# Patient Record
Sex: FEMALE | Race: WHITE | NOT HISPANIC OR LATINO | URBAN - METROPOLITAN AREA
[De-identification: names, ages, dates, MRNs, and addresses within clinical notes are randomized per-mention and may not be internally consistent; named-entity substitution may affect disease eponyms.]

---

## 2022-06-11 ENCOUNTER — INPATIENT (INPATIENT)
Facility: HOSPITAL | Age: 72
LOS: 5 days | Discharge: NURSING, REHAB OR LONG-TERM CARE | DRG: 69 | End: 2022-06-17
Attending: STUDENT IN AN ORGANIZED HEALTH CARE EDUCATION/TRAINING PROGRAM | Admitting: INTERNAL MEDICINE
Payer: MEDICARE

## 2022-06-11 VITALS
RESPIRATION RATE: 16 BRPM | OXYGEN SATURATION: 99 % | WEIGHT: 129.41 LBS | SYSTOLIC BLOOD PRESSURE: 177 MMHG | HEART RATE: 117 BPM | TEMPERATURE: 97 F | DIASTOLIC BLOOD PRESSURE: 84 MMHG

## 2022-06-11 DIAGNOSIS — Z90.710 ACQUIRED ABSENCE OF BOTH CERVIX AND UTERUS: Chronic | ICD-10-CM

## 2022-06-11 DIAGNOSIS — G45.9 TRANSIENT CEREBRAL ISCHEMIC ATTACK, UNSPECIFIED: ICD-10-CM

## 2022-06-11 LAB
ALBUMIN SERPL ELPH-MCNC: 2.8 G/DL — LOW (ref 3.3–5)
ALP SERPL-CCNC: 76 U/L — SIGNIFICANT CHANGE UP (ref 40–120)
ALT FLD-CCNC: 18 U/L — SIGNIFICANT CHANGE UP (ref 10–45)
ANION GAP SERPL CALC-SCNC: 10 MMOL/L — SIGNIFICANT CHANGE UP (ref 5–17)
ANION GAP SERPL CALC-SCNC: 8 MMOL/L — SIGNIFICANT CHANGE UP (ref 5–17)
APTT BLD: 30.8 SEC — SIGNIFICANT CHANGE UP (ref 27.5–35.5)
AST SERPL-CCNC: 31 U/L — SIGNIFICANT CHANGE UP (ref 10–40)
BASOPHILS # BLD AUTO: 0.06 K/UL — SIGNIFICANT CHANGE UP (ref 0–0.2)
BASOPHILS NFR BLD AUTO: 0.5 % — SIGNIFICANT CHANGE UP (ref 0–2)
BILIRUB SERPL-MCNC: 0.3 MG/DL — SIGNIFICANT CHANGE UP (ref 0.2–1.2)
BUN SERPL-MCNC: 48 MG/DL — HIGH (ref 7–23)
BUN SERPL-MCNC: 51 MG/DL — HIGH (ref 7–23)
CALCIUM SERPL-MCNC: 8.3 MG/DL — LOW (ref 8.4–10.5)
CALCIUM SERPL-MCNC: 8.5 MG/DL — SIGNIFICANT CHANGE UP (ref 8.4–10.5)
CHLORIDE SERPL-SCNC: 109 MMOL/L — HIGH (ref 96–108)
CHLORIDE SERPL-SCNC: 109 MMOL/L — HIGH (ref 96–108)
CO2 SERPL-SCNC: 21 MMOL/L — LOW (ref 22–31)
CO2 SERPL-SCNC: 22 MMOL/L — SIGNIFICANT CHANGE UP (ref 22–31)
CREAT SERPL-MCNC: 3.66 MG/DL — HIGH (ref 0.5–1.3)
CREAT SERPL-MCNC: 3.67 MG/DL — HIGH (ref 0.5–1.3)
D DIMER BLD IA.RAPID-MCNC: 1611 NG/ML DDU — HIGH
EGFR: 12 ML/MIN/1.73M2 — LOW
EGFR: 12 ML/MIN/1.73M2 — LOW
EOSINOPHIL # BLD AUTO: 0.36 K/UL — SIGNIFICANT CHANGE UP (ref 0–0.5)
EOSINOPHIL NFR BLD AUTO: 2.9 % — SIGNIFICANT CHANGE UP (ref 0–6)
GLUCOSE BLDC GLUCOMTR-MCNC: 128 MG/DL — HIGH (ref 70–99)
GLUCOSE SERPL-MCNC: 123 MG/DL — HIGH (ref 70–99)
GLUCOSE SERPL-MCNC: 136 MG/DL — HIGH (ref 70–99)
HCT VFR BLD CALC: 35.5 % — SIGNIFICANT CHANGE UP (ref 34.5–45)
HGB BLD-MCNC: 11.2 G/DL — LOW (ref 11.5–15.5)
IMM GRANULOCYTES NFR BLD AUTO: 0.6 % — SIGNIFICANT CHANGE UP (ref 0–1.5)
INR BLD: 1.03 RATIO — SIGNIFICANT CHANGE UP (ref 0.88–1.16)
LYMPHOCYTES # BLD AUTO: 42.1 % — SIGNIFICANT CHANGE UP (ref 13–44)
LYMPHOCYTES # BLD AUTO: 5.22 K/UL — HIGH (ref 1–3.3)
MCHC RBC-ENTMCNC: 27.1 PG — SIGNIFICANT CHANGE UP (ref 27–34)
MCHC RBC-ENTMCNC: 31.5 GM/DL — LOW (ref 32–36)
MCV RBC AUTO: 86 FL — SIGNIFICANT CHANGE UP (ref 80–100)
MONOCYTES # BLD AUTO: 0.65 K/UL — SIGNIFICANT CHANGE UP (ref 0–0.9)
MONOCYTES NFR BLD AUTO: 5.2 % — SIGNIFICANT CHANGE UP (ref 2–14)
NEUTROPHILS # BLD AUTO: 6.02 K/UL — SIGNIFICANT CHANGE UP (ref 1.8–7.4)
NEUTROPHILS NFR BLD AUTO: 48.7 % — SIGNIFICANT CHANGE UP (ref 43–77)
NRBC # BLD: 0 /100 WBCS — SIGNIFICANT CHANGE UP (ref 0–0)
PLATELET # BLD AUTO: 319 K/UL — SIGNIFICANT CHANGE UP (ref 150–400)
POTASSIUM SERPL-MCNC: 4.6 MMOL/L — SIGNIFICANT CHANGE UP (ref 3.5–5.3)
POTASSIUM SERPL-MCNC: 6 MMOL/L — HIGH (ref 3.5–5.3)
POTASSIUM SERPL-SCNC: 4.6 MMOL/L — SIGNIFICANT CHANGE UP (ref 3.5–5.3)
POTASSIUM SERPL-SCNC: 6 MMOL/L — HIGH (ref 3.5–5.3)
PROCALCITONIN SERPL-MCNC: 0.09 NG/ML — HIGH
PROT SERPL-MCNC: 7.6 G/DL — SIGNIFICANT CHANGE UP (ref 6–8.3)
PROTHROM AB SERPL-ACNC: 12 SEC — SIGNIFICANT CHANGE UP (ref 10.5–13.4)
RBC # BLD: 4.13 M/UL — SIGNIFICANT CHANGE UP (ref 3.8–5.2)
RBC # FLD: 17.5 % — HIGH (ref 10.3–14.5)
SARS-COV-2 RNA SPEC QL NAA+PROBE: SIGNIFICANT CHANGE UP
SODIUM SERPL-SCNC: 139 MMOL/L — SIGNIFICANT CHANGE UP (ref 135–145)
SODIUM SERPL-SCNC: 140 MMOL/L — SIGNIFICANT CHANGE UP (ref 135–145)
TROPONIN I, HIGH SENSITIVITY RESULT: 11 NG/L — SIGNIFICANT CHANGE UP
TROPONIN I, HIGH SENSITIVITY RESULT: 13.2 NG/L — SIGNIFICANT CHANGE UP
WBC # BLD: 12.39 K/UL — HIGH (ref 3.8–10.5)
WBC # FLD AUTO: 12.39 K/UL — HIGH (ref 3.8–10.5)

## 2022-06-11 PROCEDURE — 70450 CT HEAD/BRAIN W/O DYE: CPT | Mod: 26,77

## 2022-06-11 PROCEDURE — 70450 CT HEAD/BRAIN W/O DYE: CPT | Mod: 26,MA

## 2022-06-11 PROCEDURE — 99222 1ST HOSP IP/OBS MODERATE 55: CPT

## 2022-06-11 PROCEDURE — 71045 X-RAY EXAM CHEST 1 VIEW: CPT | Mod: 26

## 2022-06-11 PROCEDURE — 99285 EMERGENCY DEPT VISIT HI MDM: CPT

## 2022-06-11 PROCEDURE — 93010 ELECTROCARDIOGRAM REPORT: CPT

## 2022-06-11 RX ORDER — ASPIRIN/CALCIUM CARB/MAGNESIUM 324 MG
324 TABLET ORAL ONCE
Refills: 0 | Status: COMPLETED | OUTPATIENT
Start: 2022-06-11 | End: 2022-06-11

## 2022-06-11 RX ORDER — ATORVASTATIN CALCIUM 80 MG/1
80 TABLET, FILM COATED ORAL AT BEDTIME
Refills: 0 | Status: DISCONTINUED | OUTPATIENT
Start: 2022-06-11 | End: 2022-06-17

## 2022-06-11 RX ORDER — HEPARIN SODIUM 5000 [USP'U]/ML
5000 INJECTION INTRAVENOUS; SUBCUTANEOUS EVERY 8 HOURS
Refills: 0 | Status: DISCONTINUED | OUTPATIENT
Start: 2022-06-11 | End: 2022-06-12

## 2022-06-11 RX ORDER — SERTRALINE 25 MG/1
50 TABLET, FILM COATED ORAL DAILY
Refills: 0 | Status: DISCONTINUED | OUTPATIENT
Start: 2022-06-11 | End: 2022-06-17

## 2022-06-11 RX ORDER — HYDROCORTISONE 1 %
1 OINTMENT (GRAM) TOPICAL AT BEDTIME
Refills: 0 | Status: DISCONTINUED | OUTPATIENT
Start: 2022-06-11 | End: 2022-06-17

## 2022-06-11 RX ORDER — INSULIN LISPRO 100/ML
VIAL (ML) SUBCUTANEOUS AT BEDTIME
Refills: 0 | Status: DISCONTINUED | OUTPATIENT
Start: 2022-06-11 | End: 2022-06-17

## 2022-06-11 RX ORDER — PANTOPRAZOLE SODIUM 20 MG/1
40 TABLET, DELAYED RELEASE ORAL
Refills: 0 | Status: DISCONTINUED | OUTPATIENT
Start: 2022-06-11 | End: 2022-06-17

## 2022-06-11 RX ORDER — INSULIN GLARGINE 100 [IU]/ML
6 INJECTION, SOLUTION SUBCUTANEOUS AT BEDTIME
Refills: 0 | Status: DISCONTINUED | OUTPATIENT
Start: 2022-06-12 | End: 2022-06-17

## 2022-06-11 RX ORDER — DEXTROSE 50 % IN WATER 50 %
25 SYRINGE (ML) INTRAVENOUS ONCE
Refills: 0 | Status: DISCONTINUED | OUTPATIENT
Start: 2022-06-11 | End: 2022-06-17

## 2022-06-11 RX ORDER — INSULIN LISPRO 100/ML
VIAL (ML) SUBCUTANEOUS
Refills: 0 | Status: DISCONTINUED | OUTPATIENT
Start: 2022-06-11 | End: 2022-06-17

## 2022-06-11 RX ORDER — SODIUM CHLORIDE 9 MG/ML
1000 INJECTION, SOLUTION INTRAVENOUS
Refills: 0 | Status: DISCONTINUED | OUTPATIENT
Start: 2022-06-11 | End: 2022-06-17

## 2022-06-11 RX ORDER — MIRTAZAPINE 45 MG/1
15 TABLET, ORALLY DISINTEGRATING ORAL AT BEDTIME
Refills: 0 | Status: DISCONTINUED | OUTPATIENT
Start: 2022-06-11 | End: 2022-06-17

## 2022-06-11 RX ORDER — LACTOBACILLUS ACIDOPHILUS 100MM CELL
1 CAPSULE ORAL DAILY
Refills: 0 | Status: DISCONTINUED | OUTPATIENT
Start: 2022-06-11 | End: 2022-06-17

## 2022-06-11 RX ORDER — DEXTROSE 50 % IN WATER 50 %
15 SYRINGE (ML) INTRAVENOUS ONCE
Refills: 0 | Status: DISCONTINUED | OUTPATIENT
Start: 2022-06-11 | End: 2022-06-17

## 2022-06-11 RX ORDER — ASPIRIN/CALCIUM CARB/MAGNESIUM 324 MG
81 TABLET ORAL DAILY
Refills: 0 | Status: DISCONTINUED | OUTPATIENT
Start: 2022-06-12 | End: 2022-06-13

## 2022-06-11 RX ORDER — DEXTROSE 50 % IN WATER 50 %
12.5 SYRINGE (ML) INTRAVENOUS ONCE
Refills: 0 | Status: DISCONTINUED | OUTPATIENT
Start: 2022-06-11 | End: 2022-06-17

## 2022-06-11 RX ORDER — LANOLIN ALCOHOL/MO/W.PET/CERES
3 CREAM (GRAM) TOPICAL AT BEDTIME
Refills: 0 | Status: DISCONTINUED | OUTPATIENT
Start: 2022-06-11 | End: 2022-06-17

## 2022-06-11 RX ORDER — CLOPIDOGREL BISULFATE 75 MG/1
75 TABLET, FILM COATED ORAL DAILY
Refills: 0 | Status: DISCONTINUED | OUTPATIENT
Start: 2022-06-11 | End: 2022-06-13

## 2022-06-11 RX ORDER — GLUCAGON INJECTION, SOLUTION 0.5 MG/.1ML
1 INJECTION, SOLUTION SUBCUTANEOUS ONCE
Refills: 0 | Status: DISCONTINUED | OUTPATIENT
Start: 2022-06-11 | End: 2022-06-17

## 2022-06-11 RX ORDER — ACETAMINOPHEN 500 MG
650 TABLET ORAL EVERY 8 HOURS
Refills: 0 | Status: DISCONTINUED | OUTPATIENT
Start: 2022-06-11 | End: 2022-06-17

## 2022-06-11 RX ORDER — SENNA PLUS 8.6 MG/1
2 TABLET ORAL AT BEDTIME
Refills: 0 | Status: DISCONTINUED | OUTPATIENT
Start: 2022-06-11 | End: 2022-06-17

## 2022-06-11 RX ADMIN — HEPARIN SODIUM 5000 UNIT(S): 5000 INJECTION INTRAVENOUS; SUBCUTANEOUS at 22:26

## 2022-06-11 RX ADMIN — Medication 324 MILLIGRAM(S): at 18:35

## 2022-06-11 RX ADMIN — ATORVASTATIN CALCIUM 80 MILLIGRAM(S): 80 TABLET, FILM COATED ORAL at 22:25

## 2022-06-11 RX ADMIN — Medication 3 MILLIGRAM(S): at 23:20

## 2022-06-11 RX ADMIN — SENNA PLUS 2 TABLET(S): 8.6 TABLET ORAL at 22:26

## 2022-06-11 RX ADMIN — CLOPIDOGREL BISULFATE 75 MILLIGRAM(S): 75 TABLET, FILM COATED ORAL at 22:26

## 2022-06-11 RX ADMIN — MIRTAZAPINE 15 MILLIGRAM(S): 45 TABLET, ORALLY DISINTEGRATING ORAL at 23:20

## 2022-06-11 NOTE — H&P ADULT - HISTORY OF PRESENT ILLNESS
72F hx of CVA with dense L hemiparesis since , HTN, HLD, hypothyroid, CKD3, T2DM on insulin, depression pw transient L facial droop. Pt related she had been well but while trying to move her L arm with her other hand, she believed she had a 'panic attack' as if the 'bed were swallowing her up'. She then screamed for help. She denied any associated HA, dizziness, worsening weakness, SOB, CP, diaphoresis or palps. Staff noted new L facial droop and drooling and pt was sent to ED. On arrival, L facial droop has completely resolved. In ED, afebrile P: 117  BP: 177/84 sat 99% on RA. WBC: 12.4 48% N. d-dimer: 1611 K:6  BUN/cr: 48/3.66 B trop: 11.  CT head no new changes     Pt denied any new fevers, chills, cough, SOB, NVD or dysuria.

## 2022-06-11 NOTE — ED ADULT NURSE NOTE - OBJECTIVE STATEMENT
72 yr old female BIB EMS from Nursing Home for facial droop. Patient was sent from [facility] for increased facial droop and drooling per Paramedic now resolved.  Patient denies numbness, paresthesia, numbness, dizziness, chest pain, shortness of breath. Finger yogil480 mg/dl.

## 2022-06-11 NOTE — ED ADULT TRIAGE NOTE - AS TEMP SITE
SPOKE [id number] [facility] AND REFERRED THIS CASE TO HER SINCE SHE SPEAKS WITH THE PATIENT WEEKLY. oral

## 2022-06-11 NOTE — H&P ADULT - NSHPLABSRESULTS_GEN_ALL_CORE
11.2   12.39 )-----------( 319      ( 11 Jun 2022 18:10 )             35.5       06-11    140  |  109<H>  |  51<H>  ----------------------------<  136<H>  4.6   |  21<L>  |  3.67<H>    Ca    8.3<L>      11 Jun 2022 20:00    TPro  7.6  /  Alb  2.8<L>  /  TBili  0.3  /  DBili  x   /  AST  31  /  ALT  18  /  AlkPhos  76  06-11         LIVER FUNCTIONS - ( 11 Jun 2022 18:10 )  Alb: 2.8 g/dL / Pro: 7.6 g/dL / ALK PHOS: 76 U/L / ALT: 18 U/L / AST: 31 U/L / GGT: x               PT/INR - ( 11 Jun 2022 18:10 )   PT: 12.0 sec;   INR: 1.03 ratio         PTT - ( 11 Jun 2022 18:10 )  PTT:30.8 sec      POCT Blood Glucose.: 128 mg/dL (11 Jun 2022 20:43)    06-11 @ 18:10  1611 ng/mL DDU<H>      EKG: personally rev. sinus tachy at 117 bpm, Q III, avF and V1-3 no acute st changes.       CXR: wet read. personally rev. NAPD.    rad< from: CT Brain Stroke Protocol (06.11.22 @ 18:02) >    IMPRESSION:    1)  chronic ischemic changes are noted in both hemispheres, with far more   extensive gliosis and encephalomalacia in the right FERNANDO territory, but   there are chronic ischemic changes in the right MCA territory as well.   Follow-up MR imaging is recommended to rule out the possibility of a   small area of acute ischemia. There is no visible hemorrhage or   intraluminal thrombus.    < end of copied text >

## 2022-06-11 NOTE — ED PROVIDER NOTE - OBJECTIVE STATEMENT
72 y f hx of prior stroke L sided weakness minor fs droop from prior stroke noted to have worsening fs droop in nursing home as per ems in ed resolved no other new defecits

## 2022-06-11 NOTE — ED ADULT NURSE NOTE - CHIEF COMPLAINT QUOTE
[facility] and [facility] now resolved.  F/S-125.  Evaluated by Dr Damian upon arrival.  Pt sent directly to CT

## 2022-06-11 NOTE — PATIENT PROFILE ADULT - FALL HARM RISK - HARM RISK INTERVENTIONS

## 2022-06-11 NOTE — ED PROVIDER NOTE - NIH STROKE SCALE: 11. EXTINCTION AND INATTENTION, QM
Cee 45 Transitions Initial Follow Up Call    Outreach made within 2 business days of discharge: Yes    Patient: Oliverio Fall Patient : 1948   MRN: [id number]  Reason for Admission: There are no discharge diagnoses documented for the most recent discharge. Discharge Date: 21       Spoke with: wife    Discharge department/facility: [facility] Interactive Patient Contact:  Was patient able to fill all prescriptions: Yes  Was patient instructed to bring all medications to the follow-up visit: Yes  Is patient taking all medications as directed in the discharge summary?  Yes  Does patient understand their discharge instructions: Yes  Does patient have questions or concerns that need addressed prior to 7-14 day follow up office visit: no    Scheduled appointment with PCP within 7-14 days    Follow Up  Future Appointments   Date Time Provider Jeri Flynn   2021 11:00 AM JULIAN Mccann [facility]   2021  2:00 PM JULIAN Mccann [place], [place]
(0) No abnormality

## 2022-06-11 NOTE — ED ADULT TRIAGE NOTE - CHIEF COMPLAINT QUOTE
[facility] and drooling now resolved.  F/S-125.  Evaluated by Dr Damian upon arrival.  Pt sent directly to CT

## 2022-06-11 NOTE — ED ADULT NURSE NOTE - NSICDXPASTMEDICALHX_GEN_ALL_CORE_FT
PAST MEDICAL HISTORY:  CVA (cerebrovascular accident)     Hemiplegia due to infarction of brain     Hyperlipidemia     Hypothyroidism     Stage 1 chronic kidney disease

## 2022-06-11 NOTE — H&P ADULT - NSHPREVIEWOFSYSTEMS_GEN_ALL_CORE
CONSTITUTIONAL: No fever, weight loss, or fatigue  EYES: No eye pain, visual disturbances, or discharge  ENMT:  No difficulty hearing, tinnitus, vertigo; No sinus or throat pain  NECK: No pain or stiffness  RESPIRATORY: No cough, wheezing, chills or hemoptysis; No shortness of breath  CARDIOVASCULAR: No chest pain, palpitations, dizziness, or leg swelling  GASTROINTESTINAL: No abdominal or epigastric pain. No nausea, vomiting, or hematemesis; No diarrhea or constipation. No melena or hematochezia.  GENITOURINARY: No dysuria, frequency, hematuria, or incontinence  NEUROLOGICAL: No headaches, memory loss, loss of strength, numbness, or tremors. L facial droop as per HPI now resolved  SKIN: No itching, burning, rashes, or lesions   LYMPH NODES: No enlarged glands  ENDOCRINE: No heat or cold intolerance; No hair loss  MUSCULOSKELETAL: No joint pain or swelling; No muscle, back, or extremity pain  PSYCHIATRIC: No depression, anxiety, mood swings, or difficulty sleeping  HEME/LYMPH: No easy bruising, or bleeding gums  ALLERY AND IMMUNOLOGIC: No hives or eczema    IMPROVE VTE Individual Risk Assessment          RISK                                                          Points  [  ] Previous VTE                                                3  [  ] Thrombophilia                                             2  [ 2 ] Lower limb paralysis                                   2        (unable to hold up >15 seconds)    [  ] Current Cancer                                             2         (within 6 months)  [  ] Immobilization > 24 hrs                              1  [  ] ICU/CCU stay > 24 hours                             1  [ 1 ] Age > 60                                                         1    IMPROVE VTE Score:         [     3    ]    Total Risk Factor Score:    0 - 1:   Consider IPC  >2 - 3:  Thromboprophylaxis required (enoxaparin or SQ heparin)        >4:   High Risk: Thromboprophylaxis required (enoxaparin or SQ heparin), optional add IPC  **If CONTRAINDICATION to enoxaparin or SQ heparin, USE IPCs**

## 2022-06-11 NOTE — H&P ADULT - NSICDXFAMILYHX_GEN_ALL_CORE_FT
FAMILY HISTORY:  Family history of CVA  FH: CAD (coronary artery disease)  FH: diabetes mellitus

## 2022-06-11 NOTE — PATIENT PROFILE ADULT - NSPRESCRUSEDDRG_GEN_A_NUR
PPD READ AS NEGATIVE BY ALYSA WALSH. IV LEAKING; NOT IN.  REMOVED. NOTIFIED DR. Hallie Contreras; OK TO LEAVE OUT IF TOLERATES PO WELL. No

## 2022-06-11 NOTE — H&P ADULT - NSHPPHYSICALEXAM_GEN_ALL_CORE
Vital Signs Last 24 Hrs  T(C): 36.7 (11 Jun 2022 19:29), Max: 36.7 (11 Jun 2022 19:29)  T(F): 98.1 (11 Jun 2022 19:29), Max: 98.1 (11 Jun 2022 19:29)  HR: 107 (11 Jun 2022 19:29) (107 - 117)  BP: 160/81 (11 Jun 2022 19:29) (160/81 - 177/84)  BP(mean): --  RR: 17 (11 Jun 2022 19:29) (16 - 17)  SpO2: 99% (11 Jun 2022 19:29) (99% - 99%)  Daily     Daily   CAPILLARY BLOOD GLUCOSE      POCT Blood Glucose.: 128 mg/dL (11 Jun 2022 20:43)    I&O's Summary      GENERAL: NAD  HEAD:  Normocephalic  EYES: EOMI, PERRLA, conjunctiva and sclera clear  ENMT: No tonsillar erythema, exudates, or enlargement; Moist mucous membranes, No lesions  NECK: Supple, No JVD, no bruit, normal thyroid  NERVOUS SYSTEM:  Alert & Oriented X3, Good concentration; neg drift in RUE and RLE. very trace L facial droop. dense L hemiplegia of LUE and LLE with L wrist flexion, L elbow contracture  CHEST/LUNG: Clear to auscultation bilaterally; No rales, rhonchi, wheezing, or rubs  HEART: Regular rate and rhythm; No murmurs, rubs, or gallops  ABDOMEN: Soft, Nontender, +mod distention. ?palp bladder; Bowel sounds present  EXTREMITIES:  2+ Peripheral Pulses, No clubbing, cyanosis, . L LE swelling compared to R.   LYMPH: No lymphadenopathy noted  SKIN: No rashes or lesions

## 2022-06-11 NOTE — H&P ADULT - ASSESSMENT
72F hx of CVA with dense L hemiparesis since 2003, HTN, HLD, hypothyroid, CKD3, T2DM on insulin, depression pw transient L facial droop.    # Transient L facial droop resolved.   cont aspirin and statin.   check lipid profile, hgbA1c, ECHO, neuro consult.   neuro checks.  passed dysphagia screen.     #Elevated d-dimer and swollen L LE  check doppler of LLE.  consider VQ scan given CKD.     #Hx of CKD now hyperkalemic.  recheck potassium  check bladder scan.     #HTN   not currently on meds.    #HLD  cont statin     #Hypothyroid  not currently on meds.   check TSH.     #Depression   cont zoloft.     #GOC  Pt is DNR/DNI and reaffirms DNR/DNI status.     Addendum: After seeing pt, within 5mins, RN reported pt tachy to 150s with L facial droop. PT seen and examined - completely AOx3 with obvious L facial droop some deviated gaze to the L with rest of neuro exam unchanged. Telestroke contacted spoke with Dr Díaz, no indication for tPA given already dense HP and pt with still understandable speech.  repeat CT head. check MRI/MRA (but not urgent to require tx). cont ASA/statin and add on PLavix and keep SBP above 150. Pt passed repeat dysphagia screen.     Sister Valentine Neely [phone] updated and notified.  72F hx of CVA with dense L hemiparesis since 2003, HTN, HLD, hypothyroid, CKD3, T2DM on insulin, depression pw transient L facial droop.    # Transient L facial droop resolved.   cont aspirin and statin.   check lipid profile, hgbA1c, ECHO, neuro consult.   neuro checks.  passed dysphagia screen.     #Elevated d-dimer and swollen L LE  check doppler of LLE.  consider VQ scan given CKD.     #Hx of CKD now hyperkalemic. unclear baseline  recheck potassium  check bladder scan.     #HTN   not currently on meds.    #HLD  cont statin     #Hypothyroid  not currently on meds.   check TSH.     #T2DM on insulin  reduce lantus to 6u qhs for now.   correctional insulin.  check HgA1c.     #Depression   cont zoloft.     #GOC  Pt is DNR/DNI and reaffirms DNR/DNI status.     Addendum: After seeing pt, within 5mins, 830PM. RN reported pt tachy to 150s with L facial droop. PT seen and examined - completely AOx3 with obvious L facial droop some deviated gaze to the L with rest of neuro exam unchanged. NIHSS score 10 from baseline 8 earlier. Telestroke contacted spoke with Dr Díaz, no indication for tPA given already dense HP and pt with still understandable speech.  repeat CT head. check MRI/MRA (but not urgent to require tx). cont ASA/statin and add on PLavix and keep SBP above 150. Pt passed repeat dysphagia screen.     Sister Valentine Neely [phone] updated and notified.  72F hx of CVA with dense L hemiparesis since 2003, HTN, HLD, hypothyroid, CKD3, T2DM on insulin, depression pw transient L facial droop.    # Transient L facial droop resolved.   cont aspirin and statin.   check lipid profile, hgbA1c, ECHO, neuro consult.   neuro checks.  passed dysphagia screen.     #Elevated d-dimer and swollen L LE  check doppler of LLE.  consider VQ scan given CKD.     #Hx of CKD now hyperkalemic. unclear baseline  recheck potassium  check bladder scan.     #HTN   not currently on meds.    #HLD  cont statin     #Hypothyroid  not currently on meds.   check TSH.     #T2DM on insulin  reduce lantus to 6u qhs for now.   correctional insulin.  check HgA1c.     #Depression   cont zoloft.     #GOC  Pt is DNR/DNI and reaffirms DNR/DNI status.     Addendum: After seeing pt, within 5mins, 830PM. RN reported pt tachy to 150s with L facial droop. PT seen and examined - completely AOx3 with obvious L facial droop some deviated gaze to the L with rest of neuro exam unchanged. NIHSS score 10 from baseline 9 earlier. Telestroke contacted spoke with Dr Díaz, no indication for tPA given already dense HP and pt with still understandable speech.  repeat CT head. check MRI/MRA (but not urgent to require tx). cont ASA/statin and add on PLavix and keep SBP above 150. Pt passed repeat dysphagia screen.     Sister Valentine Neely [phone] updated and notified.

## 2022-06-11 NOTE — ED PROVIDER NOTE - PHYSICAL EXAMINATION
General:     NAD, well-nourished, well-appearing  Head:     NC/AT, EOMI, oral mucosa moist  Neck:     trachea midline  Lungs:     CTA b/l, no w/r/r  CVS:     S1S2, RRR, no m/g/r  Abd:     +BS, s/nt/nd, no organomegaly  Ext:    2+ radial and pedal pulses, no c/c/e  Neuro: AAOx3, no sensor L UL, LL weakness minor fs droop baseline

## 2022-06-12 LAB
A1C WITH ESTIMATED AVERAGE GLUCOSE RESULT: 6.1 % — HIGH (ref 4–5.6)
ALBUMIN SERPL ELPH-MCNC: 3 G/DL — LOW (ref 3.3–5)
ALP SERPL-CCNC: 75 U/L — SIGNIFICANT CHANGE UP (ref 40–120)
ALT FLD-CCNC: 9 U/L — LOW (ref 10–45)
ANION GAP SERPL CALC-SCNC: 13 MMOL/L — SIGNIFICANT CHANGE UP (ref 5–17)
APPEARANCE UR: CLEAR — SIGNIFICANT CHANGE UP
APTT BLD: 35.9 SEC — HIGH (ref 27.5–35.5)
AST SERPL-CCNC: 17 U/L — SIGNIFICANT CHANGE UP (ref 10–40)
BACTERIA # UR AUTO: ABNORMAL /HPF
BASOPHILS # BLD AUTO: 0.09 K/UL — SIGNIFICANT CHANGE UP (ref 0–0.2)
BASOPHILS NFR BLD AUTO: 0.6 % — SIGNIFICANT CHANGE UP (ref 0–2)
BILIRUB SERPL-MCNC: 0.3 MG/DL — SIGNIFICANT CHANGE UP (ref 0.2–1.2)
BILIRUB UR-MCNC: NEGATIVE — SIGNIFICANT CHANGE UP
BUN SERPL-MCNC: 50 MG/DL — HIGH (ref 7–23)
CALCIUM SERPL-MCNC: 8.6 MG/DL — SIGNIFICANT CHANGE UP (ref 8.4–10.5)
CHLORIDE SERPL-SCNC: 110 MMOL/L — HIGH (ref 96–108)
CHOLEST SERPL-MCNC: 145 MG/DL — SIGNIFICANT CHANGE UP
CO2 SERPL-SCNC: 17 MMOL/L — LOW (ref 22–31)
COLOR SPEC: YELLOW — SIGNIFICANT CHANGE UP
CREAT SERPL-MCNC: 3.7 MG/DL — HIGH (ref 0.5–1.3)
DIFF PNL FLD: NEGATIVE — SIGNIFICANT CHANGE UP
EGFR: 12 ML/MIN/1.73M2 — LOW
EOSINOPHIL # BLD AUTO: 0.31 K/UL — SIGNIFICANT CHANGE UP (ref 0–0.5)
EOSINOPHIL NFR BLD AUTO: 2.1 % — SIGNIFICANT CHANGE UP (ref 0–6)
EPI CELLS # UR: SIGNIFICANT CHANGE UP
ESTIMATED AVERAGE GLUCOSE: 128 MG/DL — HIGH (ref 68–114)
GLUCOSE BLDC GLUCOMTR-MCNC: 105 MG/DL — HIGH (ref 70–99)
GLUCOSE BLDC GLUCOMTR-MCNC: 78 MG/DL — SIGNIFICANT CHANGE UP (ref 70–99)
GLUCOSE BLDC GLUCOMTR-MCNC: 86 MG/DL — SIGNIFICANT CHANGE UP (ref 70–99)
GLUCOSE BLDC GLUCOMTR-MCNC: 86 MG/DL — SIGNIFICANT CHANGE UP (ref 70–99)
GLUCOSE SERPL-MCNC: 87 MG/DL — SIGNIFICANT CHANGE UP (ref 70–99)
GLUCOSE UR QL: NEGATIVE — SIGNIFICANT CHANGE UP
HCT VFR BLD CALC: 34.5 % — SIGNIFICANT CHANGE UP (ref 34.5–45)
HCV AB S/CO SERPL IA: 0.11 S/CO — SIGNIFICANT CHANGE UP (ref 0–0.99)
HCV AB SERPL-IMP: SIGNIFICANT CHANGE UP
HDLC SERPL-MCNC: 43 MG/DL — LOW
HGB BLD-MCNC: 10.9 G/DL — LOW (ref 11.5–15.5)
IMM GRANULOCYTES NFR BLD AUTO: 0.6 % — SIGNIFICANT CHANGE UP (ref 0–1.5)
INR BLD: 1.03 RATIO — SIGNIFICANT CHANGE UP (ref 0.88–1.16)
KETONES UR-MCNC: NEGATIVE — SIGNIFICANT CHANGE UP
LEUKOCYTE ESTERASE UR-ACNC: ABNORMAL
LIPID PNL WITH DIRECT LDL SERPL: 86 MG/DL — SIGNIFICANT CHANGE UP
LYMPHOCYTES # BLD AUTO: 35.8 % — SIGNIFICANT CHANGE UP (ref 13–44)
LYMPHOCYTES # BLD AUTO: 5.17 K/UL — HIGH (ref 1–3.3)
MCHC RBC-ENTMCNC: 27.1 PG — SIGNIFICANT CHANGE UP (ref 27–34)
MCHC RBC-ENTMCNC: 31.6 GM/DL — LOW (ref 32–36)
MCV RBC AUTO: 85.8 FL — SIGNIFICANT CHANGE UP (ref 80–100)
MONOCYTES # BLD AUTO: 0.94 K/UL — HIGH (ref 0–0.9)
MONOCYTES NFR BLD AUTO: 6.5 % — SIGNIFICANT CHANGE UP (ref 2–14)
NEUTROPHILS # BLD AUTO: 7.84 K/UL — HIGH (ref 1.8–7.4)
NEUTROPHILS NFR BLD AUTO: 54.4 % — SIGNIFICANT CHANGE UP (ref 43–77)
NITRITE UR-MCNC: NEGATIVE — SIGNIFICANT CHANGE UP
NON HDL CHOLESTEROL: 102 MG/DL — SIGNIFICANT CHANGE UP
NRBC # BLD: 0 /100 WBCS — SIGNIFICANT CHANGE UP (ref 0–0)
PH UR: 6.5 — SIGNIFICANT CHANGE UP (ref 5–8)
PLATELET # BLD AUTO: 325 K/UL — SIGNIFICANT CHANGE UP (ref 150–400)
POTASSIUM SERPL-MCNC: 5 MMOL/L — SIGNIFICANT CHANGE UP (ref 3.5–5.3)
POTASSIUM SERPL-SCNC: 5 MMOL/L — SIGNIFICANT CHANGE UP (ref 3.5–5.3)
PROT SERPL-MCNC: 7.6 G/DL — SIGNIFICANT CHANGE UP (ref 6–8.3)
PROT UR-MCNC: 15
PROTHROM AB SERPL-ACNC: 12 SEC — SIGNIFICANT CHANGE UP (ref 10.5–13.4)
RBC # BLD: 4.02 M/UL — SIGNIFICANT CHANGE UP (ref 3.8–5.2)
RBC # FLD: 17.5 % — HIGH (ref 10.3–14.5)
RBC CASTS # UR COMP ASSIST: SIGNIFICANT CHANGE UP /HPF (ref 0–4)
SODIUM SERPL-SCNC: 140 MMOL/L — SIGNIFICANT CHANGE UP (ref 135–145)
SP GR SPEC: 1.01 — SIGNIFICANT CHANGE UP (ref 1.01–1.02)
TRIGL SERPL-MCNC: 83 MG/DL — SIGNIFICANT CHANGE UP
TSH SERPL-MCNC: 3.5 UIU/ML — SIGNIFICANT CHANGE UP (ref 0.36–3.74)
UROBILINOGEN FLD QL: NEGATIVE — SIGNIFICANT CHANGE UP
WBC # BLD: 14.43 K/UL — HIGH (ref 3.8–10.5)
WBC # FLD AUTO: 14.43 K/UL — HIGH (ref 3.8–10.5)
WBC UR QL: ABNORMAL /HPF (ref 0–5)

## 2022-06-12 PROCEDURE — 99233 SBSQ HOSP IP/OBS HIGH 50: CPT

## 2022-06-12 PROCEDURE — 70450 CT HEAD/BRAIN W/O DYE: CPT | Mod: 26

## 2022-06-12 PROCEDURE — 93971 EXTREMITY STUDY: CPT | Mod: 26,LT

## 2022-06-12 PROCEDURE — 93306 TTE W/DOPPLER COMPLETE: CPT | Mod: 26

## 2022-06-12 RX ORDER — HEPARIN SODIUM 5000 [USP'U]/ML
2500 INJECTION INTRAVENOUS; SUBCUTANEOUS EVERY 6 HOURS
Refills: 0 | Status: DISCONTINUED | OUTPATIENT
Start: 2022-06-12 | End: 2022-06-13

## 2022-06-12 RX ORDER — HEPARIN SODIUM 5000 [USP'U]/ML
5000 INJECTION INTRAVENOUS; SUBCUTANEOUS EVERY 6 HOURS
Refills: 0 | Status: DISCONTINUED | OUTPATIENT
Start: 2022-06-12 | End: 2022-06-13

## 2022-06-12 RX ORDER — AMLODIPINE BESYLATE 2.5 MG/1
5 TABLET ORAL DAILY
Refills: 0 | Status: DISCONTINUED | OUTPATIENT
Start: 2022-06-12 | End: 2022-06-17

## 2022-06-12 RX ORDER — WARFARIN SODIUM 2.5 MG/1
2 TABLET ORAL ONCE
Refills: 0 | Status: COMPLETED | OUTPATIENT
Start: 2022-06-12 | End: 2022-06-12

## 2022-06-12 RX ORDER — HEPARIN SODIUM 5000 [USP'U]/ML
5000 INJECTION INTRAVENOUS; SUBCUTANEOUS ONCE
Refills: 0 | Status: COMPLETED | OUTPATIENT
Start: 2022-06-12 | End: 2022-06-12

## 2022-06-12 RX ORDER — HEPARIN SODIUM 5000 [USP'U]/ML
INJECTION INTRAVENOUS; SUBCUTANEOUS
Qty: 25000 | Refills: 0 | Status: DISCONTINUED | OUTPATIENT
Start: 2022-06-12 | End: 2022-06-13

## 2022-06-12 RX ADMIN — Medication 1 DROP(S): at 18:30

## 2022-06-12 RX ADMIN — Medication 81 MILLIGRAM(S): at 12:04

## 2022-06-12 RX ADMIN — HEPARIN SODIUM 5000 UNIT(S): 5000 INJECTION INTRAVENOUS; SUBCUTANEOUS at 13:37

## 2022-06-12 RX ADMIN — Medication 1 DROP(S): at 05:26

## 2022-06-12 RX ADMIN — WARFARIN SODIUM 2 MILLIGRAM(S): 2.5 TABLET ORAL at 21:25

## 2022-06-12 RX ADMIN — HEPARIN SODIUM 1100 UNIT(S)/HR: 5000 INJECTION INTRAVENOUS; SUBCUTANEOUS at 16:37

## 2022-06-12 RX ADMIN — SENNA PLUS 2 TABLET(S): 8.6 TABLET ORAL at 21:25

## 2022-06-12 RX ADMIN — Medication 1 TABLET(S): at 12:04

## 2022-06-12 RX ADMIN — CLOPIDOGREL BISULFATE 75 MILLIGRAM(S): 75 TABLET, FILM COATED ORAL at 12:04

## 2022-06-12 RX ADMIN — ATORVASTATIN CALCIUM 80 MILLIGRAM(S): 80 TABLET, FILM COATED ORAL at 21:25

## 2022-06-12 RX ADMIN — HEPARIN SODIUM 5000 UNIT(S): 5000 INJECTION INTRAVENOUS; SUBCUTANEOUS at 16:37

## 2022-06-12 RX ADMIN — MIRTAZAPINE 15 MILLIGRAM(S): 45 TABLET, ORALLY DISINTEGRATING ORAL at 21:25

## 2022-06-12 RX ADMIN — SERTRALINE 50 MILLIGRAM(S): 25 TABLET, FILM COATED ORAL at 12:04

## 2022-06-12 RX ADMIN — PANTOPRAZOLE SODIUM 40 MILLIGRAM(S): 20 TABLET, DELAYED RELEASE ORAL at 05:26

## 2022-06-12 RX ADMIN — Medication 3 MILLIGRAM(S): at 21:25

## 2022-06-12 RX ADMIN — HEPARIN SODIUM 5000 UNIT(S): 5000 INJECTION INTRAVENOUS; SUBCUTANEOUS at 05:26

## 2022-06-12 NOTE — PROGRESS NOTE ADULT - ASSESSMENT
72F hx of CVA with dense L hemiparesis since 2003, HTN, HLD, hypothyroid, CKD3, T2DM on insulin, depression pw transient L facial droop.    # Probable TIA  -pt with new left facial droop now resolved  -CT of brain:  no acute bleed or ischemia, large chronic infarct at right cerebral artery vascular territory and right frontal lobe and medial right parietal lobe   -cont aspirin and statin, plavix   -noted lipid profile, LDL 86 HgbA1c - 6.1  -ECHO:  EF 50>55%, grade 1 diastolic dysfunction  -passed dysphagia screen  -Neurology consult pending  -PT eval     #new DVT at left lower ext  -dopplers: +left common femoral vein acute DVT  -pt with CKD4 so started on Heparin/Coumadin bridge today    #Hx of CKD4  #hyperkalemia  -pt with Creat of 1.01 in Feb, 2022, since April Creat ~3  -Nephrology consult pending  -check renal sono    #DM2  -HgbA1c 6.1  -cont Lantus, ISS, accuchecks    #HTN   not currently on meds.    #HLD  cont statin     #Hypothyroid  not currently on meds.   normal TSH.     #Depression   cont zoloft.     DVT ppx - heparin and coumadin bridge    #GOC:  DNR/DNI     Dispo:  Sister Valentine Neely [phone] updated and notified.  72F hx of CVA with dense L hemiparesis since 2003, HTN, HLD, hypothyroid, CKD3, T2DM on insulin, depression pw transient L facial droop.    # Probable TIA  -pt with new left facial droop now resolved  -CT of brain:  no acute bleed or ischemia, large chronic infarct at right cerebral artery vascular territory and right frontal lobe and medial right parietal lobe   -cont aspirin and statin, plavix   -noted lipid profile, LDL 86 HgbA1c - 6.1  -ECHO:  EF 50>55%, grade 1 diastolic dysfunction  -passed dysphagia screen  -Neurology consult pending  -PT eval     #new DVT at left lower ext  -dopplers: +left common femoral vein acute DVT  -pt with CKD4 so started on Heparin/Coumadin bridge today    #Hx of CKD4  #hyperkalemia  -pt with Creat of 1.01 in Feb, 2022, since April Creat ~3  -Nephrology consult pending  -check renal sono    #DM2  -HgbA1c 6.1  -cont Lantus, ISS, accuchecks    #HTN   not currently on meds.    #HLD  cont statin     #Hypothyroid  not currently on meds.   normal TSH.     #Depression   cont zoloft.     DVT ppx - heparin and coumadin bridge    #GOC:  DNR/DNI     Dispo:  Sister Valentine Neely [phone]; attempted to leave telephone message but unable to leave on voicemail.. called a 2nd time and she could not talk states "I am in [place] now".

## 2022-06-13 LAB
ANION GAP SERPL CALC-SCNC: 10 MMOL/L — SIGNIFICANT CHANGE UP (ref 5–17)
APTT BLD: 116.1 SEC — HIGH (ref 27.5–35.5)
APTT BLD: 118.8 SEC — HIGH (ref 27.5–35.5)
APTT BLD: 161.1 SEC — SIGNIFICANT CHANGE UP (ref 27.5–35.5)
APTT BLD: 214.7 SEC — SIGNIFICANT CHANGE UP (ref 27.5–35.5)
BUN SERPL-MCNC: 47 MG/DL — HIGH (ref 7–23)
CALCIUM SERPL-MCNC: 8.9 MG/DL — SIGNIFICANT CHANGE UP (ref 8.4–10.5)
CHLORIDE SERPL-SCNC: 112 MMOL/L — HIGH (ref 96–108)
CO2 SERPL-SCNC: 22 MMOL/L — SIGNIFICANT CHANGE UP (ref 22–31)
CREAT SERPL-MCNC: 3.46 MG/DL — HIGH (ref 0.5–1.3)
EGFR: 13 ML/MIN/1.73M2 — LOW
GLUCOSE BLDC GLUCOMTR-MCNC: 100 MG/DL — HIGH (ref 70–99)
GLUCOSE BLDC GLUCOMTR-MCNC: 136 MG/DL — HIGH (ref 70–99)
GLUCOSE BLDC GLUCOMTR-MCNC: 214 MG/DL — HIGH (ref 70–99)
GLUCOSE BLDC GLUCOMTR-MCNC: 89 MG/DL — SIGNIFICANT CHANGE UP (ref 70–99)
GLUCOSE SERPL-MCNC: 93 MG/DL — SIGNIFICANT CHANGE UP (ref 70–99)
HCT VFR BLD CALC: 31.4 % — LOW (ref 34.5–45)
HCT VFR BLD CALC: 35.3 % — SIGNIFICANT CHANGE UP (ref 34.5–45)
HGB BLD-MCNC: 10.3 G/DL — LOW (ref 11.5–15.5)
HGB BLD-MCNC: 11.2 G/DL — LOW (ref 11.5–15.5)
INR BLD: 1.15 RATIO — SIGNIFICANT CHANGE UP (ref 0.88–1.16)
MCHC RBC-ENTMCNC: 27 PG — SIGNIFICANT CHANGE UP (ref 27–34)
MCHC RBC-ENTMCNC: 27.5 PG — SIGNIFICANT CHANGE UP (ref 27–34)
MCHC RBC-ENTMCNC: 31.7 GM/DL — LOW (ref 32–36)
MCHC RBC-ENTMCNC: 32.8 GM/DL — SIGNIFICANT CHANGE UP (ref 32–36)
MCV RBC AUTO: 84 FL — SIGNIFICANT CHANGE UP (ref 80–100)
MCV RBC AUTO: 85.1 FL — SIGNIFICANT CHANGE UP (ref 80–100)
NRBC # BLD: 0 /100 WBCS — SIGNIFICANT CHANGE UP (ref 0–0)
NRBC # BLD: 0 /100 WBCS — SIGNIFICANT CHANGE UP (ref 0–0)
PLATELET # BLD AUTO: 315 K/UL — SIGNIFICANT CHANGE UP (ref 150–400)
PLATELET # BLD AUTO: 320 K/UL — SIGNIFICANT CHANGE UP (ref 150–400)
POTASSIUM SERPL-MCNC: 4.8 MMOL/L — SIGNIFICANT CHANGE UP (ref 3.5–5.3)
POTASSIUM SERPL-SCNC: 4.8 MMOL/L — SIGNIFICANT CHANGE UP (ref 3.5–5.3)
PROTHROM AB SERPL-ACNC: 13.4 SEC — SIGNIFICANT CHANGE UP (ref 10.5–13.4)
RBC # BLD: 3.74 M/UL — LOW (ref 3.8–5.2)
RBC # BLD: 4.15 M/UL — SIGNIFICANT CHANGE UP (ref 3.8–5.2)
RBC # FLD: 17.3 % — HIGH (ref 10.3–14.5)
RBC # FLD: 17.5 % — HIGH (ref 10.3–14.5)
SODIUM SERPL-SCNC: 144 MMOL/L — SIGNIFICANT CHANGE UP (ref 135–145)
WBC # BLD: 12.26 K/UL — HIGH (ref 3.8–10.5)
WBC # BLD: 12.32 K/UL — HIGH (ref 3.8–10.5)
WBC # FLD AUTO: 12.26 K/UL — HIGH (ref 3.8–10.5)
WBC # FLD AUTO: 12.32 K/UL — HIGH (ref 3.8–10.5)

## 2022-06-13 PROCEDURE — 76775 US EXAM ABDO BACK WALL LIM: CPT | Mod: 26

## 2022-06-13 PROCEDURE — 99233 SBSQ HOSP IP/OBS HIGH 50: CPT

## 2022-06-13 RX ORDER — SODIUM CHLORIDE 9 MG/ML
1000 INJECTION, SOLUTION INTRAVENOUS
Refills: 0 | Status: DISCONTINUED | OUTPATIENT
Start: 2022-06-13 | End: 2022-06-17

## 2022-06-13 RX ORDER — HEPARIN SODIUM 5000 [USP'U]/ML
INJECTION INTRAVENOUS; SUBCUTANEOUS
Qty: 25000 | Refills: 0 | Status: DISCONTINUED | OUTPATIENT
Start: 2022-06-13 | End: 2022-06-14

## 2022-06-13 RX ORDER — HEPARIN SODIUM 5000 [USP'U]/ML
2500 INJECTION INTRAVENOUS; SUBCUTANEOUS EVERY 6 HOURS
Refills: 0 | Status: DISCONTINUED | OUTPATIENT
Start: 2022-06-13 | End: 2022-06-14

## 2022-06-13 RX ORDER — HEPARIN SODIUM 5000 [USP'U]/ML
5000 INJECTION INTRAVENOUS; SUBCUTANEOUS EVERY 6 HOURS
Refills: 0 | Status: DISCONTINUED | OUTPATIENT
Start: 2022-06-13 | End: 2022-06-14

## 2022-06-13 RX ORDER — WARFARIN SODIUM 2.5 MG/1
2 TABLET ORAL ONCE
Refills: 0 | Status: COMPLETED | OUTPATIENT
Start: 2022-06-13 | End: 2022-06-13

## 2022-06-13 RX ORDER — POLYETHYLENE GLYCOL 3350 17 G/17G
17 POWDER, FOR SOLUTION ORAL DAILY
Refills: 0 | Status: DISCONTINUED | OUTPATIENT
Start: 2022-06-13 | End: 2022-06-17

## 2022-06-13 RX ADMIN — Medication 1 TABLET(S): at 11:51

## 2022-06-13 RX ADMIN — INSULIN GLARGINE 6 UNIT(S): 100 INJECTION, SOLUTION SUBCUTANEOUS at 21:50

## 2022-06-13 RX ADMIN — WARFARIN SODIUM 2 MILLIGRAM(S): 2.5 TABLET ORAL at 21:49

## 2022-06-13 RX ADMIN — Medication 1 DROP(S): at 05:18

## 2022-06-13 RX ADMIN — MIRTAZAPINE 15 MILLIGRAM(S): 45 TABLET, ORALLY DISINTEGRATING ORAL at 21:50

## 2022-06-13 RX ADMIN — Medication 1 APPLICATION(S): at 21:55

## 2022-06-13 RX ADMIN — HEPARIN SODIUM 1100 UNIT(S)/HR: 5000 INJECTION INTRAVENOUS; SUBCUTANEOUS at 17:25

## 2022-06-13 RX ADMIN — SENNA PLUS 2 TABLET(S): 8.6 TABLET ORAL at 21:49

## 2022-06-13 RX ADMIN — HEPARIN SODIUM 0 UNIT(S)/HR: 5000 INJECTION INTRAVENOUS; SUBCUTANEOUS at 04:02

## 2022-06-13 RX ADMIN — HEPARIN SODIUM 1100 UNIT(S)/HR: 5000 INJECTION INTRAVENOUS; SUBCUTANEOUS at 19:39

## 2022-06-13 RX ADMIN — Medication 1 DROP(S): at 17:49

## 2022-06-13 RX ADMIN — CLOPIDOGREL BISULFATE 75 MILLIGRAM(S): 75 TABLET, FILM COATED ORAL at 11:51

## 2022-06-13 RX ADMIN — Medication 81 MILLIGRAM(S): at 11:50

## 2022-06-13 RX ADMIN — SERTRALINE 50 MILLIGRAM(S): 25 TABLET, FILM COATED ORAL at 11:51

## 2022-06-13 RX ADMIN — AMLODIPINE BESYLATE 5 MILLIGRAM(S): 2.5 TABLET ORAL at 05:17

## 2022-06-13 RX ADMIN — Medication 2: at 11:53

## 2022-06-13 RX ADMIN — PANTOPRAZOLE SODIUM 40 MILLIGRAM(S): 20 TABLET, DELAYED RELEASE ORAL at 05:17

## 2022-06-13 RX ADMIN — Medication 3 MILLIGRAM(S): at 21:49

## 2022-06-13 RX ADMIN — ATORVASTATIN CALCIUM 80 MILLIGRAM(S): 80 TABLET, FILM COATED ORAL at 21:50

## 2022-06-13 RX ADMIN — POLYETHYLENE GLYCOL 3350 17 GRAM(S): 17 POWDER, FOR SOLUTION ORAL at 11:50

## 2022-06-13 NOTE — PHYSICAL THERAPY INITIAL EVALUATION ADULT - IMPAIRMENTS CONTRIBUTING IMPAIRED BED MOBILITY, REHAB EVAL
right side hypertonicity/impaired balance/impaired coordination/impaired motor control/abnormal muscle tone/decreased strength

## 2022-06-13 NOTE — PROCEDURE NOTE - NSPROCDETAILS_GEN_ALL_CORE
location identified, draped/prepped, sterile technique used/blood seen on insertion/dressing applied/flushes easily/secured in place/sterile technique, catheter placed Ultrasound utilized throughout procedure. First to find adequate vessel and ensure vein was compressible. Second to visualize catheter tip entered vessel. Third to visualize wire entering vessel. Lastly, it was used to confirm that injected saline did not extravasate from vessel/location identified, draped/prepped, sterile technique used/blood seen on insertion/dressing applied/flushes easily/secured in place/sterile technique, catheter placed

## 2022-06-13 NOTE — PHYSICAL THERAPY INITIAL EVALUATION ADULT - PERTINENT HX OF CURRENT PROBLEM, REHAB EVAL
pw transient facial droop and drooling; CT brain showed chronic ischemic changes right FERNANDO and MCA territories

## 2022-06-13 NOTE — OCCUPATIONAL THERAPY INITIAL EVALUATION ADULT - ADDITIONAL COMMENTS
Pt. has been at [facility] x 2-3 months, being allison lifted for transfers at this time.

## 2022-06-13 NOTE — OCCUPATIONAL THERAPY INITIAL EVALUATION ADULT - GENERAL OBSERVATIONS, REHAB EVAL
+IV, +Bloody Urine (Providers aware), +Cath, +Monitoring.  Pt received sitting on bedside chair, with left arm in typical position- elbow flexed, forearm pronated, wrist dropped, shoulder adducted to side of trunk, since 2003 CVA. Per pt. report- splint was provided recently, but not with pt at this time.

## 2022-06-13 NOTE — DIETITIAN INITIAL EVALUATION ADULT - NS FNS DIET ORDER
Consistent Carbohydrate, Soft bite sixe with evening snack  Consistent Carbohydrate, Soft bite  , Renal with evening snack

## 2022-06-13 NOTE — DIETITIAN INITIAL EVALUATION ADULT - PERTINENT LABORATORY DATA
06-13    144  |  112<H>  |  47<H>  ----------------------------<  93  4.8   |  22  |  3.46<H>    Ca    8.9      13 Jun 2022 06:44    TPro  7.6  /  Alb  3.0<L>  /  TBili  0.3  /  DBili  x   /  AST  17  /  ALT  9<L>  /  AlkPhos  75  06-12  POCT Blood Glucose.: 100 mg/dL (06-13-22 @ 08:06)  A1C with Estimated Average Glucose Result: 6.1 % (06-12-22 @ 06:25)

## 2022-06-13 NOTE — OCCUPATIONAL THERAPY INITIAL EVALUATION ADULT - PERTINENT HX OF CURRENT PROBLEM, REHAB EVAL
Pt. admitted from [facility], due to transient facial drooping and drooling.  Pt.

## 2022-06-13 NOTE — DIETITIAN INITIAL EVALUATION ADULT - OTHER INFO
72F hx of CVA with dense L hemiparesis since 2003, HTN, HLD, hypothyroid, CKD3, T2DM on insulin, depression presents with  transient L facial droop. Neuro consult pending . No Bm reported x 1 week , suggest add bowel regimen. Skin intact , No edema , POCT reviewed , recent A1c 6.1% .

## 2022-06-13 NOTE — PROVIDER CONTACT NOTE (OTHER) - SITUATION
Heparin infusion rate did not population automatically according to previously results, aPTT was not generating in [place].

## 2022-06-13 NOTE — OCCUPATIONAL THERAPY INITIAL EVALUATION ADULT - NEUROMUSCULAR RE-EDUCATION, PT EVAL
Dr. Walton
Pt. will tolerate Left UE Neuromuscular Re-ed and stretching, with mod vcs for self ranging. (1-3 sessions.)

## 2022-06-13 NOTE — PHYSICAL THERAPY INITIAL EVALUATION ADULT - PATIENT PROFILE REVIEW, REHAB EVAL
Fred Londono is a 13 year old 3  month old female who was brought in for her  Establish Care (New patient) visit.   Subjective   History was provided by patient, mother and father     HPI:   Patient presents for:  Patient presents with:  Establish fractures  Hematologic/immunologic:   no bruising or allergy concerns  Metabolic/Endocrine:   all negative  Neurologic/Psychiatric:   no headaches, no behavior or mood changes  Objective   Physical Exam:      07/22/19  1020   BP: 124/72   Pulse: 90   Resp: counseling and surveillance    Need for vaccination  -     IMADM ANY ROUTE 1ST VAC/TOX  -     INADM ANY ROUTE ADDL VAC/TOX  -     HPV HUMAN PAPILLOMA VIRUS VACC 9 CASPER 3 DOSE IM  -     MENINGOCOCCAL VACCINE, GROUPS A,C,Y & W-135 IM USE  -     HEPATITIS A VA yes

## 2022-06-13 NOTE — PROGRESS NOTE ADULT - ASSESSMENT
72F hx of CVA with dense L hemiparesis since 2003, HTN, HLD, hypothyroid, CKD3, T2DM on insulin, depression pw transient L facial droop.    # Probable TIA:  -pt with new left facial droop now resolved  -CT of brain:  no acute bleed or ischemia, large chronic infarct at right cerebral artery vascular territory and right frontal lobe and medial right parietal lobe   -cont aspirin and statin, plavix   -noted lipid profile, LDL 86 HgbA1c - 6.1  -ECHO:  EF 50>55%, grade 1 diastolic dysfunction  -passed dysphagia screen  -Neurology consult pending  -PT eval : rec back to [facility]     #new DVT at left lower ext  -dopplers: +left common femoral vein acute DVT  -pt with CKD4 so started on Heparin/Coumadin bridge 6/14  - Daily INR--stop hep when INR > 2    #Hx of CKD4  #hyperkalemia  -pt with Creat of 1.01 in Feb, 2022, since April Creat ~3  -Nephrology consult appreciated---rec to continue rodriguez   -check renal sono    #DM2  -HgbA1c 6.1  -cont Lantus, ISS, accuchecks    #HTN   not currently on meds.    #HLD  cont statin     #Leukocytosis:  resolving    #Hypothyroid  not currently on meds.   normal TSH.     #Depression   cont zoloft.     DVT ppx - heparin and coumadin bridge    #GOC:  DNR/DNI     Dispo: 6/13: Updated Sister Valentine Neely [phone]. Questions answered  72F hx of CVA with dense L hemiparesis since 2003, HTN, HLD, hypothyroid, CKD3, T2DM on insulin, depression pw transient L facial droop.    # Probable TIA:  -pt with new left facial droop now resolved  -CT of brain:  no acute bleed or ischemia, large chronic infarct at right cerebral artery vascular territory and right frontal lobe and medial right parietal lobe   -cont aspirin and statin, plavix   -noted lipid profile, LDL 86 HgbA1c - 6.1  -ECHO:  EF 50>55%, grade 1 diastolic dysfunction  -passed dysphagia screen  -Neurology consult appreciated- Dr. Mantilla--obtain carotid sono--ok to stop ASA and plavix while on coumadin/heparin gtt   -PT eval : rec back to GG     # New DVT at left lower ext  -dopplers: +left common femoral vein acute DVT  -pt with CKD4 so started on Heparin/Coumadin bridge 6/14  - Daily INR--stop hep when INR > 2    #Hx of CKD4  #hyperkalemia  -pt with Creat of 1.01 in Feb, 2022, since April Creat ~3  -Nephrology consult appreciated---rec to continue rodriguez   -check renal sono    #DM2  -HgbA1c 6.1  -cont Lantus, ISS, accuchecks    #HTN   not currently on meds.    #HLD  cont statin     #Leukocytosis:  resolving    #Hypothyroid  not currently on meds.   normal TSH.     #Depression   cont zoloft.     DVT ppx - heparin and coumadin bridge    #GOC:  DNR/DNI     Dispo: 6/13: Updated Sister Valentine Neely [phone]. Questions answered     *Case d/w Dr Mantilla

## 2022-06-13 NOTE — DIETITIAN INITIAL EVALUATION ADULT - ORAL INTAKE PTA/DIET HISTORY
Patient reports decreased po intake due to dislikes nursing facility food  ~ 40lb weight loss reported as per patient x 3 months UBW ~160lbs . Food preferences obtained

## 2022-06-13 NOTE — DIETITIAN NUTRITION RISK NOTIFICATION - TREATMENT: THE FOLLOWING DIET HAS BEEN RECOMMENDED
Diet, Consistent Carbohydrate Renal w/Evening Snack:   Soft and Bite Sized ([id number]) (06-11-22 @ 21:35) [Active]

## 2022-06-13 NOTE — PROCEDURE NOTE - ADDITIONAL PROCEDURE DETAILS
Time spent on procedure independent of Critical Care time spent at the bedside --> 20 gauge Extended dwell placed in upper arm/axillary vein  Indication for procedure: DVT  Dx Code: I82.40

## 2022-06-13 NOTE — DIETITIAN INITIAL EVALUATION ADULT - PERTINENT MEDS FT
MEDICATIONS  (STANDING):  amLODIPine   Tablet 5 milliGRAM(s) Oral daily  artificial  tears Solution 1 Drop(s) Both EYES two times a day  aspirin enteric coated 81 milliGRAM(s) Oral daily  atorvastatin 80 milliGRAM(s) Oral at bedtime  clopidogrel Tablet 75 milliGRAM(s) Oral daily  dextrose 5%. 1000 milliLiter(s) (100 mL/Hr) IV Continuous <Continuous>  dextrose 5%. 1000 milliLiter(s) (50 mL/Hr) IV Continuous <Continuous>  dextrose 50% Injectable 25 Gram(s) IV Push once  dextrose 50% Injectable 12.5 Gram(s) IV Push once  dextrose 50% Injectable 25 Gram(s) IV Push once  glucagon  Injectable 1 milliGRAM(s) IntraMuscular once  heparin  Infusion.  Unit(s)/Hr (11 mL/Hr) IV Continuous <Continuous>  hydrocortisone 2.5% Rectal Cream 1 Application(s) Rectal at bedtime  insulin glargine Injectable (LANTUS) 6 Unit(s) SubCutaneous at bedtime  insulin lispro (ADMELOG) corrective regimen sliding scale   SubCutaneous three times a day before meals  insulin lispro (ADMELOG) corrective regimen sliding scale   SubCutaneous at bedtime  lactobacillus acidophilus 1 Tablet(s) Oral daily  melatonin 3 milliGRAM(s) Oral at bedtime  mirtazapine 15 milliGRAM(s) Oral at bedtime  multivitamin 1 Tablet(s) Oral daily  pantoprazole    Tablet 40 milliGRAM(s) Oral before breakfast  polyethylene glycol 3350 17 Gram(s) Oral daily  senna 2 Tablet(s) Oral at bedtime  sertraline 50 milliGRAM(s) Oral daily  warfarin 2 milliGRAM(s) Oral once    MEDICATIONS  (PRN):  acetaminophen     Tablet .. 650 milliGRAM(s) Oral every 8 hours PRN Temp greater or equal to 38C (100.4F), Mild Pain (1 - 3)  dextrose Oral Gel 15 Gram(s) Oral once PRN Blood Glucose LESS THAN 70 milliGRAM(s)/deciliter  heparin   Injectable 5000 Unit(s) IV Push every 6 hours PRN For aPTT less than 40  heparin   Injectable 2500 Unit(s) IV Push every 6 hours PRN For aPTT between 40 - 57

## 2022-06-13 NOTE — PHARMACOTHERAPY INTERVENTION NOTE - COMMENTS
Met with patient and reviewed current medications. Counseled on new meds aspirin, Plavix, and warfarin. Reviewed reasons for use, directions for use and possible side effects. Discussed signs and symptoms of bleeding to watch for and when to seek medical attention. Discussed need to keep vitamin K intake consistent, pt states she normally doesn't eat leafy greens. Also discussed need for INR checks. All questions/concerns addressed. Met with patient and reviewed current medications. Counseled on new med warfarin. Reviewed reasons for use, directions for use and possible side effects. Discussed signs and symptoms of bleeding to watch for and when to seek medical attention. Discussed need to keep vitamin K intake consistent, pt states she normally doesn't eat leafy greens. Also discussed need for INR checks. All questions/concerns addressed.

## 2022-06-13 NOTE — PROVIDER CONTACT NOTE (OTHER) - ACTION/TREATMENT ORDERED:
Per nomogram, rate should be 10cc/hr. However, order is for 11cc/hr. Adjusted per Full Anticoagulation Titration Dosing. PA Ashley authorized rate change. Pharmacist Melchor confirmed rate.

## 2022-06-14 LAB
ANION GAP SERPL CALC-SCNC: 11 MMOL/L — SIGNIFICANT CHANGE UP (ref 5–17)
APTT BLD: 153.9 SEC — SIGNIFICANT CHANGE UP (ref 27.5–35.5)
APTT BLD: 57.9 SEC — HIGH (ref 27.5–35.5)
APTT BLD: 87.1 SEC — HIGH (ref 27.5–35.5)
BUN SERPL-MCNC: 41 MG/DL — HIGH (ref 7–23)
CALCIUM SERPL-MCNC: 8.2 MG/DL — LOW (ref 8.4–10.5)
CHLORIDE SERPL-SCNC: 107 MMOL/L — SIGNIFICANT CHANGE UP (ref 96–108)
CO2 SERPL-SCNC: 19 MMOL/L — LOW (ref 22–31)
CREAT SERPL-MCNC: 2.97 MG/DL — HIGH (ref 0.5–1.3)
EGFR: 16 ML/MIN/1.73M2 — LOW
GLUCOSE BLDC GLUCOMTR-MCNC: 120 MG/DL — HIGH (ref 70–99)
GLUCOSE BLDC GLUCOMTR-MCNC: 121 MG/DL — HIGH (ref 70–99)
GLUCOSE BLDC GLUCOMTR-MCNC: 154 MG/DL — HIGH (ref 70–99)
GLUCOSE BLDC GLUCOMTR-MCNC: 76 MG/DL — SIGNIFICANT CHANGE UP (ref 70–99)
GLUCOSE SERPL-MCNC: 85 MG/DL — SIGNIFICANT CHANGE UP (ref 70–99)
HCT VFR BLD CALC: 30.6 % — LOW (ref 34.5–45)
HCT VFR BLD CALC: 33.8 % — LOW (ref 34.5–45)
HGB BLD-MCNC: 10.8 G/DL — LOW (ref 11.5–15.5)
HGB BLD-MCNC: 9.7 G/DL — LOW (ref 11.5–15.5)
INR BLD: 1.09 RATIO — SIGNIFICANT CHANGE UP (ref 0.88–1.16)
MCHC RBC-ENTMCNC: 26.7 PG — LOW (ref 27–34)
MCHC RBC-ENTMCNC: 26.7 PG — LOW (ref 27–34)
MCHC RBC-ENTMCNC: 31.7 GM/DL — LOW (ref 32–36)
MCHC RBC-ENTMCNC: 32 GM/DL — SIGNIFICANT CHANGE UP (ref 32–36)
MCV RBC AUTO: 83.7 FL — SIGNIFICANT CHANGE UP (ref 80–100)
MCV RBC AUTO: 84.3 FL — SIGNIFICANT CHANGE UP (ref 80–100)
NRBC # BLD: 0 /100 WBCS — SIGNIFICANT CHANGE UP (ref 0–0)
NRBC # BLD: 0 /100 WBCS — SIGNIFICANT CHANGE UP (ref 0–0)
PLATELET # BLD AUTO: 288 K/UL — SIGNIFICANT CHANGE UP (ref 150–400)
PLATELET # BLD AUTO: 294 K/UL — SIGNIFICANT CHANGE UP (ref 150–400)
POTASSIUM SERPL-MCNC: 4.4 MMOL/L — SIGNIFICANT CHANGE UP (ref 3.5–5.3)
POTASSIUM SERPL-SCNC: 4.4 MMOL/L — SIGNIFICANT CHANGE UP (ref 3.5–5.3)
PROTHROM AB SERPL-ACNC: 12.7 SEC — SIGNIFICANT CHANGE UP (ref 10.5–13.4)
RBC # BLD: 3.63 M/UL — LOW (ref 3.8–5.2)
RBC # BLD: 4.04 M/UL — SIGNIFICANT CHANGE UP (ref 3.8–5.2)
RBC # FLD: 17.4 % — HIGH (ref 10.3–14.5)
RBC # FLD: 17.4 % — HIGH (ref 10.3–14.5)
SODIUM SERPL-SCNC: 137 MMOL/L — SIGNIFICANT CHANGE UP (ref 135–145)
WBC # BLD: 11.69 K/UL — HIGH (ref 3.8–10.5)
WBC # BLD: 13.07 K/UL — HIGH (ref 3.8–10.5)
WBC # FLD AUTO: 11.69 K/UL — HIGH (ref 3.8–10.5)
WBC # FLD AUTO: 13.07 K/UL — HIGH (ref 3.8–10.5)

## 2022-06-14 PROCEDURE — 99233 SBSQ HOSP IP/OBS HIGH 50: CPT

## 2022-06-14 PROCEDURE — 93880 EXTRACRANIAL BILAT STUDY: CPT | Mod: 26

## 2022-06-14 RX ORDER — HEPARIN SODIUM 5000 [USP'U]/ML
5000 INJECTION INTRAVENOUS; SUBCUTANEOUS EVERY 6 HOURS
Refills: 0 | Status: DISCONTINUED | OUTPATIENT
Start: 2022-06-14 | End: 2022-06-16

## 2022-06-14 RX ORDER — WARFARIN SODIUM 2.5 MG/1
5 TABLET ORAL ONCE
Refills: 0 | Status: COMPLETED | OUTPATIENT
Start: 2022-06-14 | End: 2022-06-14

## 2022-06-14 RX ORDER — TRAMADOL HYDROCHLORIDE 50 MG/1
25 TABLET ORAL EVERY 6 HOURS
Refills: 0 | Status: DISCONTINUED | OUTPATIENT
Start: 2022-06-14 | End: 2022-06-17

## 2022-06-14 RX ORDER — HEPARIN SODIUM 5000 [USP'U]/ML
800 INJECTION INTRAVENOUS; SUBCUTANEOUS
Qty: 25000 | Refills: 0 | Status: DISCONTINUED | OUTPATIENT
Start: 2022-06-14 | End: 2022-06-16

## 2022-06-14 RX ORDER — HEPARIN SODIUM 5000 [USP'U]/ML
2500 INJECTION INTRAVENOUS; SUBCUTANEOUS EVERY 6 HOURS
Refills: 0 | Status: DISCONTINUED | OUTPATIENT
Start: 2022-06-14 | End: 2022-06-16

## 2022-06-14 RX ADMIN — HEPARIN SODIUM 900 UNIT(S)/HR: 5000 INJECTION INTRAVENOUS; SUBCUTANEOUS at 02:00

## 2022-06-14 RX ADMIN — PANTOPRAZOLE SODIUM 40 MILLIGRAM(S): 20 TABLET, DELAYED RELEASE ORAL at 05:20

## 2022-06-14 RX ADMIN — Medication 650 MILLIGRAM(S): at 14:09

## 2022-06-14 RX ADMIN — HEPARIN SODIUM 800 UNIT(S)/HR: 5000 INJECTION INTRAVENOUS; SUBCUTANEOUS at 18:32

## 2022-06-14 RX ADMIN — Medication 1 DROP(S): at 18:26

## 2022-06-14 RX ADMIN — Medication 1 TABLET(S): at 12:11

## 2022-06-14 RX ADMIN — TRAMADOL HYDROCHLORIDE 25 MILLIGRAM(S): 50 TABLET ORAL at 16:51

## 2022-06-14 RX ADMIN — SODIUM CHLORIDE 60 MILLILITER(S): 9 INJECTION, SOLUTION INTRAVENOUS at 00:25

## 2022-06-14 RX ADMIN — HEPARIN SODIUM 900 UNIT(S)/HR: 5000 INJECTION INTRAVENOUS; SUBCUTANEOUS at 09:32

## 2022-06-14 RX ADMIN — HEPARIN SODIUM 0 UNIT(S)/HR: 5000 INJECTION INTRAVENOUS; SUBCUTANEOUS at 00:54

## 2022-06-14 RX ADMIN — HEPARIN SODIUM 800 UNIT(S)/HR: 5000 INJECTION INTRAVENOUS; SUBCUTANEOUS at 19:42

## 2022-06-14 RX ADMIN — MIRTAZAPINE 15 MILLIGRAM(S): 45 TABLET, ORALLY DISINTEGRATING ORAL at 22:13

## 2022-06-14 RX ADMIN — Medication 1 APPLICATION(S): at 22:16

## 2022-06-14 RX ADMIN — TRAMADOL HYDROCHLORIDE 25 MILLIGRAM(S): 50 TABLET ORAL at 17:11

## 2022-06-14 RX ADMIN — INSULIN GLARGINE 6 UNIT(S): 100 INJECTION, SOLUTION SUBCUTANEOUS at 22:20

## 2022-06-14 RX ADMIN — WARFARIN SODIUM 5 MILLIGRAM(S): 2.5 TABLET ORAL at 22:17

## 2022-06-14 RX ADMIN — SENNA PLUS 2 TABLET(S): 8.6 TABLET ORAL at 22:19

## 2022-06-14 RX ADMIN — Medication 3 MILLIGRAM(S): at 22:17

## 2022-06-14 RX ADMIN — HEPARIN SODIUM 900 UNIT(S)/HR: 5000 INJECTION INTRAVENOUS; SUBCUTANEOUS at 07:29

## 2022-06-14 RX ADMIN — AMLODIPINE BESYLATE 5 MILLIGRAM(S): 2.5 TABLET ORAL at 05:18

## 2022-06-14 RX ADMIN — ATORVASTATIN CALCIUM 80 MILLIGRAM(S): 80 TABLET, FILM COATED ORAL at 22:12

## 2022-06-14 RX ADMIN — SERTRALINE 50 MILLIGRAM(S): 25 TABLET, FILM COATED ORAL at 12:11

## 2022-06-14 RX ADMIN — Medication 1 DROP(S): at 05:19

## 2022-06-14 RX ADMIN — Medication 650 MILLIGRAM(S): at 13:39

## 2022-06-14 NOTE — SWALLOW BEDSIDE ASSESSMENT ADULT - COMMENTS
WBC: 13.07    CT Head 6/12: "Large chronic infarct in the right anterior cerebral artery vascular   territory with encephalomalacia/gliosis in the medial right frontal lobe and anterior medial right parietal lobe is stable. Small chronic appearing infarcts in the right corona radiata, right caudate nucleus and right lentiform nucleus are stable. Ex vacuo dilatation of the right lateral ventricle is stable. There is mild generalized cerebral volume loss and mild to moderate patchy periventricular white matter hypoattenuation compatible with chronic microvascular ischemic disease. There is minimal patchy paranasal sinus mucosal thickening. Trace secretions within the posterior right ethmoid air cell may represent trapped secretions versus sinusitis."\    Chest xray 6/11: Unremarkable

## 2022-06-14 NOTE — PROGRESS NOTE ADULT - ASSESSMENT
72F hx of CVA with dense L hemiparesis since 2003, HTN, HLD, hypothyroid, CKD3, T2DM on insulin, depression pw transient L facial droop.    # Probable TIA  -pt with new left facial droop now resolved  -pt had 2 CAT scans of the brain on this admission:  no acute bleed or ischemia, large chronic infarct at right cerebral artery vascular territory and right frontal lobe and medial right parietal lobe   -stopped ASA and Plavix today due to hematuria  -cont Heparin gtt/Coumadin bridge  -noted lipid profile, LDL 86 HgbA1c - 6.1  -ECHO:  EF 50>55%, grade 1 diastolic dysfunction  -passed dysphagia screen  -Neurology consult appreciated- Dr. Mantilla following  -Carotid sono:  no significant stenosis  -PT eval : rec back to      # New DVT at left lower ext  -dopplers: +left common femoral vein acute DVT  -pt with CKD4 so started on Heparin/Coumadin bridge 6/14  -Daily INR--stop hep when INR > 2  -INR 1.09 today    #Obstructive MAXIMILIAN  #Hx of CKD4  -pt with Creat of 1.01 in Feb, 2022, since April Creat ~3  -Nephrology consult appreciated---rec to continue rodriguez   -Renal sono:  prominent renal pelvis and fullness of collecting systems b/l R>L    #DM2  -HgbA1c 6.1  -cont Lantus, ISS, accuchecks    #HTN   not currently on meds.    #HLD  cont statin     #Leukocytosis:  resolving    #Hypothyroid  not currently on meds.   normal TSH.     #Depression   cont zoloft.     DVT ppx - heparin and coumadin bridge    #GOC:  DNR/DNI     Dispo: Updated [facility] [phone]. Questions answered    72F hx of CVA with dense L hemiparesis since 2003, HTN, HLD, hypothyroid, CKD3, T2DM on insulin, depression pw transient L facial droop.    # Probable TIA  -pt with new left facial droop now resolved  -pt had 2 CAT scans of the brain on this admission:  no acute bleed or ischemia, large chronic infarct at right cerebral artery vascular territory and right frontal lobe and medial right parietal lobe   -stopped ASA and Plavix today due to hematuria  -cont Heparin gtt/Coumadin bridge  -noted lipid profile, LDL 86 HgbA1c - 6.1  -ECHO:  EF 50>55%, grade 1 diastolic dysfunction  -passed dysphagia screen  -Neurology consult appreciated- Dr. Mantilla following  -Carotid sono:  no significant stenosis  -PT eval : rec back to [facility]     # New DVT at left lower ext  -dopplers: +left common femoral vein acute DVT  -pt with CKD4 so started on Heparin/Coumadin bridge 6/14  -Daily INR--stop hep when INR > 2  -INR 1.09 today    #Hematuria  #Obstructive MAXIMILIAN on CKD4  #urinary retention  -patient having pain at rodriguez cath site--rodriguez removed and Nurse having difficulty replacing rodriguez due to pain, Urology consult pending   -cont bladder scans for now and straight cath prn  -pt with Creat of 1.01 in Feb, 2022, since April Creat ~3  -Nephrology consult appreciated, pt with obstructive MAXIMILIAN, cont gentle IVF   -U/A bland  -Renal sono:  prominent renal pelvis and fullness of collecting systems b/l R>L    #DM2  -HgbA1c 6.1  -cont Lantus, ISS, accuchecks    #HTN   not currently on meds.    #HLD  cont statin     #Leukocytosis:  resolving    #Hypothyroid  not currently on meds.   normal TSH.     #Depression   cont zoloft.     DVT ppx - heparin and coumadin bridge    #GOC:  DNR/DNI     Dispo: pt states she will update her [facility] [phone]

## 2022-06-14 NOTE — SWALLOW BEDSIDE ASSESSMENT ADULT - SLP PERTINENT HISTORY OF CURRENT PROBLEM
72F hx of CVA with dense L hemiparesis since , HTN, HLD, hypothyroid, CKD3, T2DM on insulin, depression pw transient L facial droop. Pt related she had been well but while trying to move her L arm with her other hand, she believed she had a 'panic attack' as if the 'bed were swallowing her up'. She then screamed for help. She denied any associated HA, dizziness, worsening weakness, SOB, CP, diaphoresis or palps. Staff noted new L facial droop and drooling and pt was sent to ED. On arrival, L facial droop has completely resolved. In ED, afebrile P: 117  BP: 177/84 sat 99% on RA. WBC: 12.4 48% N. d-dimer: 1611 K:6  BUN/cr: 48/3.66 B trop: 11.  CT head no new changes

## 2022-06-14 NOTE — SWALLOW BEDSIDE ASSESSMENT ADULT - SWALLOW EVAL: DIAGNOSIS
Patient presents with evidence of mild oral dysphagia (chronic) in the setting of partial dentition. Patient self-fed trials of thin liquid via straw and regular solids. Adequate acceptance and anterior containment. Prolonged munch-chew mastication pattern, yet adequate for breakdown of regular solids. No oral residue viewed after swallow. Pharyngeal motor response appreciated via digital palpation. No overt signs of aspiration or airway invasion.

## 2022-06-14 NOTE — PROGRESS NOTE ADULT - ASSESSMENT
Patient is a 72 year old woman admitted 6/11/22. She was noted at the SNF where she resides to have a new left facial droop after she complained of HA. She was not noted to hae the left facial droop on admission. She had residual left hemiplegia from a prior stroke.  Later in the evening had tachycardia in the 150s and noted not have pronounced left facial weakness. Telestroke was called and advised plavix in addition to ASA. Since then she denies HA or other new neurological complaints. She was found to have a left lower extremity DVT and has been started on coumadin with heparin as bridge. Neurological exam as bridge. The slight Left CN VII paresis may be old from prior stroke. Consideration that more pronounced left facial weakness at the SNF  which was not noted when admitted but later noted again with tachycardia after admission may have been secondary to TIA.    1) CT Head 6/12/22 compared to 6/11/22 as above large chronic infarct right FERNANDO territory with encephalomalacia right frontal and anterior medial right parietal lobe. Chronic infarcts right corona radiata, right caudate, right lentiform nucleus. Ex vacuo dilatation right ventricle. No acute intracranial hemorrhage.  2) Echocardiogram as above no thrombus  3) Coumadin for Left lower extremity DVT would provide stroke prophylaxis. Would not continue antiplatelet agents while on coumadin for stroke indications. When coumadin discontinued consider ASA 81 mg daily alone if no contraindication for stroke prophylaxis.  4) Consider  Cardiology consult with regard to episode of tachyarrhythmia in the 150s.  5) Carotid Duplex as above no significant stenosis

## 2022-06-14 NOTE — SWALLOW BEDSIDE ASSESSMENT ADULT - SLP GENERAL OBSERVATIONS
Patient alert and oriented to self, place, and year. Re-oriented to time and situation by this clinician. Patient able to follow directives for completion of oral mechanism examination and report history.

## 2022-06-15 LAB
ANION GAP SERPL CALC-SCNC: 10 MMOL/L — SIGNIFICANT CHANGE UP (ref 5–17)
APTT BLD: 74.4 SEC — HIGH (ref 27.5–35.5)
BUN SERPL-MCNC: 38 MG/DL — HIGH (ref 7–23)
CALCIUM SERPL-MCNC: 7.8 MG/DL — LOW (ref 8.4–10.5)
CHLORIDE SERPL-SCNC: 107 MMOL/L — SIGNIFICANT CHANGE UP (ref 96–108)
CO2 SERPL-SCNC: 24 MMOL/L — SIGNIFICANT CHANGE UP (ref 22–31)
CREAT SERPL-MCNC: 2.52 MG/DL — HIGH (ref 0.5–1.3)
EGFR: 20 ML/MIN/1.73M2 — LOW
GLUCOSE BLDC GLUCOMTR-MCNC: 120 MG/DL — HIGH (ref 70–99)
GLUCOSE BLDC GLUCOMTR-MCNC: 146 MG/DL — HIGH (ref 70–99)
GLUCOSE BLDC GLUCOMTR-MCNC: 150 MG/DL — HIGH (ref 70–99)
GLUCOSE BLDC GLUCOMTR-MCNC: 80 MG/DL — SIGNIFICANT CHANGE UP (ref 70–99)
GLUCOSE SERPL-MCNC: 87 MG/DL — SIGNIFICANT CHANGE UP (ref 70–99)
HCT VFR BLD CALC: 32.2 % — LOW (ref 34.5–45)
HCT VFR BLD CALC: 33.3 % — LOW (ref 34.5–45)
HGB BLD-MCNC: 10.5 G/DL — LOW (ref 11.5–15.5)
HGB BLD-MCNC: 10.6 G/DL — LOW (ref 11.5–15.5)
INR BLD: 1.1 RATIO — SIGNIFICANT CHANGE UP (ref 0.88–1.16)
MCHC RBC-ENTMCNC: 26.8 PG — LOW (ref 27–34)
MCHC RBC-ENTMCNC: 27.3 PG — SIGNIFICANT CHANGE UP (ref 27–34)
MCHC RBC-ENTMCNC: 31.8 GM/DL — LOW (ref 32–36)
MCHC RBC-ENTMCNC: 32.6 GM/DL — SIGNIFICANT CHANGE UP (ref 32–36)
MCV RBC AUTO: 83.9 FL — SIGNIFICANT CHANGE UP (ref 80–100)
MCV RBC AUTO: 84.1 FL — SIGNIFICANT CHANGE UP (ref 80–100)
NRBC # BLD: 0 /100 WBCS — SIGNIFICANT CHANGE UP (ref 0–0)
NRBC # BLD: 0 /100 WBCS — SIGNIFICANT CHANGE UP (ref 0–0)
PLATELET # BLD AUTO: 283 K/UL — SIGNIFICANT CHANGE UP (ref 150–400)
PLATELET # BLD AUTO: 288 K/UL — SIGNIFICANT CHANGE UP (ref 150–400)
POTASSIUM SERPL-MCNC: 4.2 MMOL/L — SIGNIFICANT CHANGE UP (ref 3.5–5.3)
POTASSIUM SERPL-SCNC: 4.2 MMOL/L — SIGNIFICANT CHANGE UP (ref 3.5–5.3)
PROTHROM AB SERPL-ACNC: 12.8 SEC — SIGNIFICANT CHANGE UP (ref 10.5–13.4)
RBC # BLD: 3.84 M/UL — SIGNIFICANT CHANGE UP (ref 3.8–5.2)
RBC # BLD: 3.96 M/UL — SIGNIFICANT CHANGE UP (ref 3.8–5.2)
RBC # FLD: 17.5 % — HIGH (ref 10.3–14.5)
RBC # FLD: 17.7 % — HIGH (ref 10.3–14.5)
SODIUM SERPL-SCNC: 141 MMOL/L — SIGNIFICANT CHANGE UP (ref 135–145)
WBC # BLD: 10.18 K/UL — SIGNIFICANT CHANGE UP (ref 3.8–10.5)
WBC # BLD: 11.43 K/UL — HIGH (ref 3.8–10.5)
WBC # FLD AUTO: 10.18 K/UL — SIGNIFICANT CHANGE UP (ref 3.8–10.5)
WBC # FLD AUTO: 11.43 K/UL — HIGH (ref 3.8–10.5)

## 2022-06-15 PROCEDURE — 99233 SBSQ HOSP IP/OBS HIGH 50: CPT

## 2022-06-15 RX ORDER — WARFARIN SODIUM 2.5 MG/1
6 TABLET ORAL ONCE
Refills: 0 | Status: COMPLETED | OUTPATIENT
Start: 2022-06-15 | End: 2022-06-15

## 2022-06-15 RX ORDER — WARFARIN SODIUM 2.5 MG/1
3 TABLET ORAL ONCE
Refills: 0 | Status: DISCONTINUED | OUTPATIENT
Start: 2022-06-15 | End: 2022-06-15

## 2022-06-15 RX ORDER — CEFUROXIME AXETIL 250 MG
250 TABLET ORAL EVERY 12 HOURS
Refills: 0 | Status: DISCONTINUED | OUTPATIENT
Start: 2022-06-15 | End: 2022-06-17

## 2022-06-15 RX ADMIN — Medication 1 DROP(S): at 17:12

## 2022-06-15 RX ADMIN — PANTOPRAZOLE SODIUM 40 MILLIGRAM(S): 20 TABLET, DELAYED RELEASE ORAL at 05:14

## 2022-06-15 RX ADMIN — HEPARIN SODIUM 800 UNIT(S)/HR: 5000 INJECTION INTRAVENOUS; SUBCUTANEOUS at 07:38

## 2022-06-15 RX ADMIN — Medication 1 TABLET(S): at 12:38

## 2022-06-15 RX ADMIN — Medication 1 TABLET(S): at 12:37

## 2022-06-15 RX ADMIN — Medication 3 MILLIGRAM(S): at 21:07

## 2022-06-15 RX ADMIN — ATORVASTATIN CALCIUM 80 MILLIGRAM(S): 80 TABLET, FILM COATED ORAL at 21:07

## 2022-06-15 RX ADMIN — HEPARIN SODIUM 800 UNIT(S)/HR: 5000 INJECTION INTRAVENOUS; SUBCUTANEOUS at 00:42

## 2022-06-15 RX ADMIN — Medication 650 MILLIGRAM(S): at 18:04

## 2022-06-15 RX ADMIN — POLYETHYLENE GLYCOL 3350 17 GRAM(S): 17 POWDER, FOR SOLUTION ORAL at 12:37

## 2022-06-15 RX ADMIN — AMLODIPINE BESYLATE 5 MILLIGRAM(S): 2.5 TABLET ORAL at 05:13

## 2022-06-15 RX ADMIN — WARFARIN SODIUM 6 MILLIGRAM(S): 2.5 TABLET ORAL at 21:07

## 2022-06-15 RX ADMIN — MIRTAZAPINE 15 MILLIGRAM(S): 45 TABLET, ORALLY DISINTEGRATING ORAL at 21:06

## 2022-06-15 RX ADMIN — SODIUM CHLORIDE 60 MILLILITER(S): 9 INJECTION, SOLUTION INTRAVENOUS at 23:32

## 2022-06-15 RX ADMIN — Medication 250 MILLIGRAM(S): at 17:12

## 2022-06-15 RX ADMIN — SENNA PLUS 2 TABLET(S): 8.6 TABLET ORAL at 21:07

## 2022-06-15 RX ADMIN — SERTRALINE 50 MILLIGRAM(S): 25 TABLET, FILM COATED ORAL at 12:38

## 2022-06-15 RX ADMIN — Medication 1 DROP(S): at 05:13

## 2022-06-15 RX ADMIN — INSULIN GLARGINE 6 UNIT(S): 100 INJECTION, SOLUTION SUBCUTANEOUS at 21:06

## 2022-06-15 NOTE — CONSULT NOTE ADULT - SUBJECTIVE AND OBJECTIVE BOX
HPI:  72F hx of CVA with dense L hemiparesis since , HTN, HLD, hypothyroid, CKD3, T2DM on insulin, depression pw transient L facial droop. Pt related she had been well but while trying to move her L arm with her other hand, she believed she had a 'panic attack' as if the 'bed were swallowing her up'. She then screamed for help. She denied any associated HA, dizziness, worsening weakness, SOB, CP, diaphoresis or palps. Staff noted new L facial droop and drooling and pt was sent to ED. On arrival, L facial droop has completely resolved. In ED, afebrile P: 117  BP: 177/84 sat 99% on RA. WBC: 12.4 48% N. d-dimer: 1611 K:6  BUN/cr: 48/3.66 B trop: 11.  CT head no new changes     Pt denied any new fevers, chills, cough, SOB, NVD or dysuria.      Admitted for CVA.    Retention on caths now - high PVR's    Noted to have hematuria on caths. Renal sono showed no stones or mass. Bilateral collecting system prominence noted - likely due to retention. On anticoag for CVA.        PAST MEDICAL & SURGICAL HISTORY:  CVA (cerebrovascular accident)      Hyperlipidemia      Stage 1 chronic kidney disease      Hypothyroidism      Hemiplegia due to infarction of brain      History of hysterectomy          REVIEW OF SYSTEMS:    CONSTITUTIONAL:  no fevers or chills  RESPIRATORY: No shortness of breath  CARDIOVASCULAR: No chest pain  GASTROINTESTINAL: No abdominal or epigastric pain.       MEDICATIONS  (STANDING):  amLODIPine   Tablet 5 milliGRAM(s) Oral daily  artificial  tears Solution 1 Drop(s) Both EYES two times a day  atorvastatin 80 milliGRAM(s) Oral at bedtime  glucagon  Injectable 1 milliGRAM(s) IntraMuscular once  heparin  Infusion. 800 Unit(s)/Hr (8 mL/Hr) IV Continuous <Continuous>  hydrocortisone 2.5% Rectal Cream 1 Application(s) Rectal at bedtime  insulin glargine Injectable (LANTUS) 6 Unit(s) SubCutaneous at bedtime  insulin lispro (ADMELOG) corrective regimen sliding scale   SubCutaneous three times a day before meals  insulin lispro (ADMELOG) corrective regimen sliding scale   SubCutaneous at bedtime  lactobacillus acidophilus 1 Tablet(s) Oral daily  melatonin 3 milliGRAM(s) Oral at bedtime  mirtazapine 15 milliGRAM(s) Oral at bedtime  multivitamin 1 Tablet(s) Oral daily  pantoprazole    Tablet 40 milliGRAM(s) Oral before breakfast  polyethylene glycol 3350 17 Gram(s) Oral daily  senna 2 Tablet(s) Oral at bedtime  sertraline 50 milliGRAM(s) Oral daily  sodium chloride 0.45%. 1000 milliLiter(s) (60 mL/Hr) IV Continuous <Continuous>    MEDICATIONS  (PRN):  acetaminophen     Tablet .. 650 milliGRAM(s) Oral every 8 hours PRN Temp greater or equal to 38C (100.4F), Mild Pain (1 - 3)  dextrose Oral Gel 15 Gram(s) Oral once PRN Blood Glucose LESS THAN 70 milliGRAM(s)/deciliter  heparin   Injectable 5000 Unit(s) IV Push every 6 hours PRN For aPTT less than 40  heparin   Injectable 2500 Unit(s) IV Push every 6 hours PRN For aPTT between 40 - 57  traMADol 25 milliGRAM(s) Oral every 6 hours PRN Moderate Pain (4 - 6)      Allergies    No Known Allergies        SOCIAL HISTORY: No illicit drug use    FAMILY HISTORY:  FH: CAD (coronary artery disease)    Family history of CVA    FH: diabetes mellitus        Vital Signs Last 24 Hrs  T(C): 36.3 (15 Adalberto 2022 05:15), Max: 36.7 (2022 12:15)  T(F): 97.4 (15 Adalberto 2022 05:15), Max: 98.1 (2022 12:15)  HR: 83 (15 Adalberto 2022 05:15) (83 - 91)  BP: 139/69      PHYSICAL EXAM:    Constitutional: NAD  Respiratory: No accessory respiratory muscle use  Abd: Soft, NT/ND    : Bladder NT          LABS:                        10.6   11.43 )-----------( 288      ( 15 Adalberto 2022 06:50 )             33.3     06-15    141  |  107  |  38<H>  ----------------------------<  87  4.2   |  24  |  2.52<H>    Ca    7.8<L>      15 Adalberto 2022 06:50      PT/INR - ( 15 Adalberto 2022 06:53 )   PT: 12.8 sec;   INR: 1.10 ratio         PTT - ( 15 Adalberto 2022 00:20 )  PTT:74.4 sec            RADIOLOGY & ADDITIONAL STUDIES: Renal sono
    Patient is a 72 year old woman admitted 6/11/22. She was noted at the SNF where she resides to have a new left facial droop after she complained of HA. She was not noted to hae the left facial droop on admission. She had residual left hemiplegia from a prior stroke.  Later in the evening had tachycardia in the 150s and noted not have pronounced left facial weakness. Telestroke was called and advised plavix in addition to ASA. Since then she denies HA or other new neurological complaints. She was found to have a left lower extremity DVT and has been started on coumadin with heparin as bridge.    PMH: As above, S/P CVA with residual left hemiplegia 2013           HTN           HLD           Diabetes           CKD           Hypothyroid           Depression    SH: No allergy    Exam: Awake, alert, appropriate            Speech- without dysarthria            Pupils 2.5mm, EOM intact, no nystagmus, VFF           CN II-XII intact except Left VII-slight delay on left o smiling, slight flattening on left compared to right           Motor tone and strength            RUE 5/5        LUE 0/5, maintains flexed at elbow and wrist                                                           RLE  5/5        LLE 0/5                          11.2   12.26 )-----------( 320      ( 13 Jun 2022 06:44 )             35.3     06-13    144  |  112<H>  |  47<H>  ----------------------------<  93  4.8   |  22  |  3.46<H>    Ca    8.9      13 Jun 2022 06:44    TPro  7.6  /  Alb  3.0<L>  /  TBili  0.3  /  DBili  x   /  AST  17  /  ALT  9<L>  /  AlkPhos  75  06-12        < from: TTE Echo Complete w/o Contrast w/ Doppler (06.12.22 @ 08:14) >      Summary:   1. Left ventricular ejection fraction, by visual estimation, is 50 to   55%.   2.Normal global left ventricular systolic function.   3. Spectral Doppler shows impaired relaxation pattern of left   ventricular myocardial filling (Grade I diastolic dysfunction).   4. There is moderate concentric left ventricular hypertrophy.   5. Normal left atrial size.   6. Normal right atrial size.   7. Mild mitral valve regurgitation.   8. Thickening of the anterior and posterior mitral valve leaflets.   9. Mild tricuspid regurgitation.  10. Mild aortic regurgitation.            < from: CT Head No Cont (06.12.22 @ 20:36) >    COMPARISON STUDY: 06/11/2022 at 9:06 PM and 6:02 PM.    FINDINGS:  There is no CT evidence of acute intracranial hemorrhage, mass effect,   midline shift or acute territorial infarct.    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 Small chronic   appearing infarcts in the right corona radiata, right caudate nucleus and   right lentiform nucleus are stable. Ex vacuo dilatation of the right   lateral ventricle is stable.    There is mild generalized cerebral volume loss and mild to moderate   patchy periventricular white matter hypoattenuation compatible with   chronic microvascular ischemic disease.      There is minimal patchy paranasal sinus mucosal thickening. Trace   secretions within the posterior right ethmoid air cell may represent   trapped secretions versus sinusitis.    The mastoids are clear bilaterally.    The patient is status post cataract lens placement surgery bilaterally.    The calvarium and skull base appear within normal limits.    IMPRESSION:  No CT evidence of acute intracranial pathology.  No interval change from   06/11/2022 at 6:02 PM.                                                
HPI:    Patient is a 72y old  Female admitted for observation after a transient L facial gtt. Working Dx TIA. Initial BW revealed elevated Cr, K. H & P states hx of CKD but I do not see any date confirming such.   Primary nurse related to me that pt had a large post void residual of 1400cc for which she was straight cathed. However, she dose not have a Mckeon at present. I noted abdominal distention on exam. Pt denies urge to void  Repeat Bladder Scan this afternoon showed PVR of 900        PAST MEDICAL & SURGICAL HISTORY:  CVA (cerebrovascular accident)      Hyperlipidemia      Stage 1 chronic kidney disease      Hypothyroidism      Hemiplegia due to infarction of brain      History of hysterectomy          Social Hx:     FAMILY HISTORY:  FH: CAD (coronary artery disease)    Family history of CVA    FH: diabetes mellitus        Allergies    No Known Allergies    Intolerances        MEDICATIONS  (STANDING):  amLODIPine   Tablet 5 milliGRAM(s) Oral daily  artificial  tears Solution 1 Drop(s) Both EYES two times a day  aspirin enteric coated 81 milliGRAM(s) Oral daily  atorvastatin 80 milliGRAM(s) Oral at bedtime  clopidogrel Tablet 75 milliGRAM(s) Oral daily  dextrose 5%. 1000 milliLiter(s) (100 mL/Hr) IV Continuous <Continuous>  dextrose 5%. 1000 milliLiter(s) (50 mL/Hr) IV Continuous <Continuous>  dextrose 50% Injectable 25 Gram(s) IV Push once  dextrose 50% Injectable 12.5 Gram(s) IV Push once  dextrose 50% Injectable 25 Gram(s) IV Push once  glucagon  Injectable 1 milliGRAM(s) IntraMuscular once  heparin   Injectable 5000 Unit(s) IV Push once  heparin  Infusion.  Unit(s)/Hr (11 mL/Hr) IV Continuous <Continuous>  hydrocortisone 2.5% Rectal Cream 1 Application(s) Rectal at bedtime  insulin glargine Injectable (LANTUS) 6 Unit(s) SubCutaneous at bedtime  insulin lispro (ADMELOG) corrective regimen sliding scale   SubCutaneous three times a day before meals  insulin lispro (ADMELOG) corrective regimen sliding scale   SubCutaneous at bedtime  lactobacillus acidophilus 1 Tablet(s) Oral daily  melatonin 3 milliGRAM(s) Oral at bedtime  mirtazapine 15 milliGRAM(s) Oral at bedtime  multivitamin 1 Tablet(s) Oral daily  pantoprazole    Tablet 40 milliGRAM(s) Oral before breakfast  senna 2 Tablet(s) Oral at bedtime  sertraline 50 milliGRAM(s) Oral daily    MEDICATIONS  (PRN):  acetaminophen     Tablet .. 650 milliGRAM(s) Oral every 8 hours PRN Temp greater or equal to 38C (100.4F), Mild Pain (1 - 3)  dextrose Oral Gel 15 Gram(s) Oral once PRN Blood Glucose LESS THAN 70 milliGRAM(s)/deciliter  heparin   Injectable 5000 Unit(s) IV Push every 6 hours PRN For aPTT less than 40  heparin   Injectable 2500 Unit(s) IV Push every 6 hours PRN For aPTT between 40 - 57      Daily     Daily Weight in k.1 (2022 05:37)    Drug Dosing Weight    Weight (kg): 61.6 (2022 22:52)      REVIEW OF SYSTEMS:    L facial droop  Urinary retention  MAXIMILIAN  HyperK            I&O's Detail    2022 07:01  -  2022 07:00  --------------------------------------------------------  IN:  Total IN: 0 mL    OUT:    Intermittent Catheterization - Urethral (mL): 1500 mL  Total OUT: 1500 mL    Total NET: -1500 mL      2022 07:  -  2022 15:51  --------------------------------------------------------  IN:    Oral Fluid: 200 mL  Total IN: 200 mL    OUT:    Voided (mL): 200 mL  Total OUT: 200 mL    Total NET: 0 mL          11 @ 07:  -   @ 07:00  --------------------------------------------------------  IN: 0 mL / OUT: 1500 mL / NET: -1500 mL     @ 07:01  -   @ 15:51  --------------------------------------------------------  IN: 200 mL / OUT: 200 mL / NET: 0 mL        PHYSICAL EXAM:    GENERAL: NAD  ENMT: moist mucous membranes.   NECK: Supple. No increase in JVP  NERVOUS SYSTEM:  contracted.   CHEST/LUNG: Clear to auscultation bilaterally  HEART: Regular rate and rhythm. No murmurs, rubs, or gallops  ABDOMEN: Soft, Nontender, Nondistended. POS BS  EXTREMITIES:  no edema      LABS:  CBC Full  -  ( 2022 06:25 )  WBC Count : 14.43 K/uL  RBC Count : 4.02 M/uL  Hemoglobin : 10.9 g/dL  Hematocrit : 34.5 %  Platelet Count - Automated : 325 K/uL  Mean Cell Volume : 85.8 fl  Mean Cell Hemoglobin : 27.1 pg  Mean Cell Hemoglobin Concentration : 31.6 gm/dL  Auto Neutrophil # : 7.84 K/uL  Auto Lymphocyte # : 5.17 K/uL  Auto Monocyte # : 0.94 K/uL  Auto Eosinophil # : 0.31 K/uL  Auto Basophil # : 0.09 K/uL  Auto Neutrophil % : 54.4 %  Auto Lymphocyte % : 35.8 %  Auto Monocyte % : 6.5 %  Auto Eosinophil % : 2.1 %  Auto Basophil % : 0.6 %        140  |  110<H>  |  50<H>  ----------------------------<  87  5.0   |  17<L>  |  3.70<H>    Ca    8.6      2022 06:25    TPro  7.6  /  Alb  3.0<L>  /  TBili  0.3  /  DBili  x   /  AST  17  /  ALT  9<L>  /  AlkPhos  75  -12    CAPILLARY BLOOD GLUCOSE      POCT Blood Glucose.: 86 mg/dL (2022 11:11)    PT/INR - ( 2022 14:15 )   PT: 12.0 sec;   INR: 1.03 ratio         PTT - ( 2022 14:15 )  PTT:35.9 sec  Urinalysis Basic - ( 2022 05:55 )    Color: Yellow / Appearance: Clear / S.010 / pH: x  Gluc: x / Ketone: Negative  / Bili: Negative / Urobili: Negative   Blood: x / Protein: 15 / Nitrite: Negative   Leuk Esterase: Moderate / RBC: 0-4 /HPF / WBC 6-10 /HPF   Sq Epi: x / Non Sq Epi: Neg.-Few / Bacteria: Few /HPF          Impression:  * MAXIMILIAN -- post-renal  * HyperK -- controlled.   * ? CKD  * TIA  * Prior CVA    Recommendations:   * Insert and maintain Mckeon  * Check Renal US  * Obtain baseline Cr from Paul Shelton [facility]    Thank you!

## 2022-06-15 NOTE — CONSULT NOTE ADULT - ASSESSMENT
Patient is a 72 year old woman admitted 6/11/22. She was noted at the SNF where she resides to have a new left facial droop after she complained of HA. She was not noted to hae the left facial droop on admission. She had residual left hemiplegia from a prior stroke.  Later in the evening had tachycardia in the 150s and noted not have pronounced left facial weakness. Telestroke was called and advised plavix in addition to ASA. Since then she denies HA or other new neurological complaints. She was found to have a left lower extremity DVT and has been started on coumadin with heparin as bridge. Neurological exam as bridge. The slight Left CN VII paresis may be old from prior stroke. Consideration that more pronounced left facial weakness at the SNF  which was not noted when admitted but later noted again with tachycardia after admission may have been secondary to TIA.    1) CT Head 6/12/22 compared to 6/11/22 as above large chronic infarct right FERNANDO territory with encephalomalacia right frontal and anterior medial right parietal lobe. Chronic infarcts right corona radiata, right caudate, right lentiform nucleus. Ex vacuo dilatation right ventricle. No acute intracranial hemorrhage.  2) Echocardiogram as above no thrombus  3) Coumadin for Left lower extremity DVT would provide stroke prophylaxis. Would not continue antiplatelet agents while on coumadin for stroke indications. When coumadin discontinued consider ASA 81 mg daily alone if no contraindication for stroke prophylaxis.  4) Consider  Cardiology consult with regard to episode of tachyarrhythmia in the 150s.  5) Carotid Duplex
Admitted for CVA/TIA. Retention on caths and now with hematuria. Sono without stones or mass. H/H stable.    - send urine for culture  - send urine for cytology  - OK to continue Antocoag therapy  - continue straight caths or consider rodriguez for retention  - consider starting empiric antibiotics as UTI a possibilty

## 2022-06-15 NOTE — PROGRESS NOTE ADULT - ASSESSMENT
72F hx of CVA with dense L hemiparesis since 2003, HTN, HLD, hypothyroid, CKD3, T2DM on insulin, depression pw transient L facial droop.    # Probable TIA  -pt with new left facial droop now resolved  -pt had 2 CAT scans of the brain on this admission:  no acute bleed or ischemia, large chronic infarct at right cerebral artery vascular territory and right frontal lobe and medial right parietal lobe   -stopped ASA and Plavix due to hematuria--Neuro in agreement-rec to start ASA alone when off coumadin  -cont Heparin gtt/Coumadin bridge--ok to continue as per urology   -noted lipid profile, LDL 86 HgbA1c - 6.1  -ECHO:  EF 50>55%, grade 1 diastolic dysfunction  -passed dysphagia screen  -Neurology consult appreciated- Dr. Mantilla following  -Carotid sono:  no significant stenosis  -PT eval : rec back to      # New DVT at left lower ext  -dopplers: +left common femoral vein acute DVT  -pt with CKD4 so started on Heparin/Coumadin bridge 6/14  -Daily INR--stop hep when INR > 2  -INR 1.1 today    #Hematuria  #Obstructive MAXIMILIAN on CKD4  #urinary retention  -patient having pain at rodriguez cath site--rodriguez removed and Nurse having difficulty replacing rodriguez due to pain  - Urology consult --rec staright caths prn and send off urine cx and start Ceftin after cx sent   -cont bladder scans for now and straight cath prn  -pt with Creat of 1.01 in Feb, 2022, since April Creat ~3  -Nephrology consult appreciated, pt with obstructive MAXIMILIAN, cont gentle IVF   -U/A bland  -Renal sono:  prominent renal pelvis and fullness of collecting systems b/l R>L    #DM2  -HgbA1c 6.1  -cont Lantus, ISS, accuchecks    #HTN   not currently on meds.    #HLD  cont statin     #Leukocytosis:  resolving    #Hypothyroid  not currently on meds.   normal TSH.     #Depression   cont zoloft.     DVT ppx - heparin and coumadin bridge    #GOC:  DNR/DNI     Dispo: pt states she will update her Sister Valentine Neely [phone]    Case d/w Dr MCCANN, urology

## 2022-06-16 LAB
ANION GAP SERPL CALC-SCNC: 10 MMOL/L — SIGNIFICANT CHANGE UP (ref 5–17)
APTT BLD: 89.5 SEC — HIGH (ref 27.5–35.5)
BUN SERPL-MCNC: 39 MG/DL — HIGH (ref 7–23)
CALCIUM SERPL-MCNC: 7.7 MG/DL — LOW (ref 8.4–10.5)
CHLORIDE SERPL-SCNC: 107 MMOL/L — SIGNIFICANT CHANGE UP (ref 96–108)
CO2 SERPL-SCNC: 23 MMOL/L — SIGNIFICANT CHANGE UP (ref 22–31)
CREAT SERPL-MCNC: 2.32 MG/DL — HIGH (ref 0.5–1.3)
EGFR: 22 ML/MIN/1.73M2 — LOW
GLUCOSE BLDC GLUCOMTR-MCNC: 112 MG/DL — HIGH (ref 70–99)
GLUCOSE BLDC GLUCOMTR-MCNC: 115 MG/DL — HIGH (ref 70–99)
GLUCOSE BLDC GLUCOMTR-MCNC: 139 MG/DL — HIGH (ref 70–99)
GLUCOSE BLDC GLUCOMTR-MCNC: 174 MG/DL — HIGH (ref 70–99)
GLUCOSE SERPL-MCNC: 122 MG/DL — HIGH (ref 70–99)
HCT VFR BLD CALC: 30.4 % — LOW (ref 34.5–45)
HGB BLD-MCNC: 9.5 G/DL — LOW (ref 11.5–15.5)
INR BLD: 1.42 RATIO — HIGH (ref 0.88–1.16)
MCHC RBC-ENTMCNC: 26.8 PG — LOW (ref 27–34)
MCHC RBC-ENTMCNC: 31.3 GM/DL — LOW (ref 32–36)
MCV RBC AUTO: 85.6 FL — SIGNIFICANT CHANGE UP (ref 80–100)
NRBC # BLD: 0 /100 WBCS — SIGNIFICANT CHANGE UP (ref 0–0)
PLATELET # BLD AUTO: 261 K/UL — SIGNIFICANT CHANGE UP (ref 150–400)
POTASSIUM SERPL-MCNC: 4 MMOL/L — SIGNIFICANT CHANGE UP (ref 3.5–5.3)
POTASSIUM SERPL-SCNC: 4 MMOL/L — SIGNIFICANT CHANGE UP (ref 3.5–5.3)
PROTHROM AB SERPL-ACNC: 16.5 SEC — HIGH (ref 10.5–13.4)
RBC # BLD: 3.55 M/UL — LOW (ref 3.8–5.2)
RBC # FLD: 17.7 % — HIGH (ref 10.3–14.5)
SODIUM SERPL-SCNC: 140 MMOL/L — SIGNIFICANT CHANGE UP (ref 135–145)
WBC # BLD: 14.57 K/UL — HIGH (ref 3.8–10.5)
WBC # FLD AUTO: 14.57 K/UL — HIGH (ref 3.8–10.5)

## 2022-06-16 PROCEDURE — 99233 SBSQ HOSP IP/OBS HIGH 50: CPT

## 2022-06-16 RX ORDER — APIXABAN 2.5 MG/1
5 TABLET, FILM COATED ORAL
Refills: 0 | Status: DISCONTINUED | OUTPATIENT
Start: 2022-06-16 | End: 2022-06-17

## 2022-06-16 RX ADMIN — Medication 650 MILLIGRAM(S): at 02:42

## 2022-06-16 RX ADMIN — AMLODIPINE BESYLATE 5 MILLIGRAM(S): 2.5 TABLET ORAL at 05:43

## 2022-06-16 RX ADMIN — Medication 650 MILLIGRAM(S): at 17:17

## 2022-06-16 RX ADMIN — ATORVASTATIN CALCIUM 80 MILLIGRAM(S): 80 TABLET, FILM COATED ORAL at 21:14

## 2022-06-16 RX ADMIN — Medication 1 TABLET(S): at 12:55

## 2022-06-16 RX ADMIN — Medication 650 MILLIGRAM(S): at 03:12

## 2022-06-16 RX ADMIN — SERTRALINE 50 MILLIGRAM(S): 25 TABLET, FILM COATED ORAL at 12:55

## 2022-06-16 RX ADMIN — Medication 1 TABLET(S): at 12:54

## 2022-06-16 RX ADMIN — Medication 250 MILLIGRAM(S): at 17:11

## 2022-06-16 RX ADMIN — HEPARIN SODIUM 800 UNIT(S)/HR: 5000 INJECTION INTRAVENOUS; SUBCUTANEOUS at 07:14

## 2022-06-16 RX ADMIN — Medication 1 DROP(S): at 05:44

## 2022-06-16 RX ADMIN — APIXABAN 5 MILLIGRAM(S): 2.5 TABLET, FILM COATED ORAL at 17:11

## 2022-06-16 RX ADMIN — Medication 1 APPLICATION(S): at 21:17

## 2022-06-16 RX ADMIN — MIRTAZAPINE 15 MILLIGRAM(S): 45 TABLET, ORALLY DISINTEGRATING ORAL at 21:14

## 2022-06-16 RX ADMIN — Medication 250 MILLIGRAM(S): at 05:43

## 2022-06-16 RX ADMIN — HEPARIN SODIUM 800 UNIT(S)/HR: 5000 INJECTION INTRAVENOUS; SUBCUTANEOUS at 08:48

## 2022-06-16 RX ADMIN — INSULIN GLARGINE 6 UNIT(S): 100 INJECTION, SOLUTION SUBCUTANEOUS at 21:17

## 2022-06-16 RX ADMIN — Medication 3 MILLIGRAM(S): at 21:14

## 2022-06-16 RX ADMIN — PANTOPRAZOLE SODIUM 40 MILLIGRAM(S): 20 TABLET, DELAYED RELEASE ORAL at 06:00

## 2022-06-16 RX ADMIN — HEPARIN SODIUM 800 UNIT(S)/HR: 5000 INJECTION INTRAVENOUS; SUBCUTANEOUS at 01:22

## 2022-06-16 RX ADMIN — Medication 1 DROP(S): at 17:11

## 2022-06-16 NOTE — DISCHARGE NOTE PROVIDER - CARE PROVIDER_API CALL
FRANCISCO J POOL  Internal Medicine  [address] [facility] [place] [address]  Phone: [phone]  Fax: [phone]  Follow Up Time:

## 2022-06-16 NOTE — DISCHARGE NOTE PROVIDER - NSDCMRMEDTOKEN_GEN_ALL_CORE_FT
acetaminophen 325 mg oral tablet: 2 tab(s) orally every 8 hours, As Needed  Anusol-HC 2.5% rectal cream with applicator: 1 application rectal once a day (at bedtime)  Artificial Tears ophthalmic solution: 1 drop(s) to each affected eye 2 times a day  Colace 100 mg oral capsule: 2 cap(s) orally once a day (at bedtime)  HumaLOG 100 units/mL subcutaneous solution: sliding scale  lactobacillus acidophilus oral tablet: 1 tab(s) orally once a day  Lantus 100 units/mL subcutaneous solution: 8 unit(s) subcutaneous once a day (at bedtime)  Melatonin 3 mg oral tablet: 1 tab(s) orally once a day (at bedtime)  metFORMIN 500 mg oral tablet: 1 tab(s) orally 2 times a day  mirtazapine 15 mg oral tablet: 1 tab(s) orally once a day (at bedtime)  Multiple Vitamins oral tablet: 1 tab(s) orally once a day  rosuvastatin 10 mg oral tablet: 1 tab(s) orally once a day  Senna 8.6 mg oral tablet: 2 tab(s) orally once a day (at bedtime), As Needed  sertraline 50 mg oral tablet: 1 tab(s) orally once a day   acetaminophen 325 mg oral tablet: 2 tab(s) orally every 8 hours, As Needed  amLODIPine 5 mg oral tablet: 1 tab(s) orally once a day  Anusol-HC 2.5% rectal cream with applicator: 1 application rectal once a day (at bedtime)  apixaban 5 mg oral tablet: 1 tab(s) orally 2 times a day  Artificial Tears ophthalmic solution: 1 drop(s) to each affected eye 2 times a day  atorvastatin 80 mg oral tablet: 1 tab(s) orally once a day (at bedtime)  cefuroxime 250 mg oral tablet: 1 tab(s) orally every 12 hours  Colace 100 mg oral capsule: 2 cap(s) orally once a day (at bedtime)  insulin glargine 100 units/mL subcutaneous solution: 6 unit(s) subcutaneous once a day (at bedtime)  lactobacillus acidophilus oral tablet: 1 tab(s) orally once a day  Melatonin 3 mg oral tablet: 1 tab(s) orally once a day (at bedtime)  mirtazapine 15 mg oral tablet: 1 tab(s) orally once a day (at bedtime)  Multiple Vitamins oral tablet: 1 tab(s) orally once a day  pantoprazole 40 mg oral delayed release tablet: 1 tab(s) orally once a day (before a meal)  Senna 8.6 mg oral tablet: 2 tab(s) orally once a day (at bedtime), As Needed  sertraline 50 mg oral tablet: 1 tab(s) orally once a day  traMADol 50 mg oral tablet: 0.5 tab(s) orally every 6 hours, As needed, Moderate Pain (4 - 6)   acetaminophen 325 mg oral tablet: 2 tab(s) orally every 8 hours, As Needed  Anusol-HC 2.5% rectal cream with applicator: 1 application rectal once a day (at bedtime)  apixaban 5 mg oral tablet: 1 tab(s) orally 2 times a day  Artificial Tears ophthalmic solution: 1 drop(s) to each affected eye 2 times a day  atorvastatin 80 mg oral tablet: 1 tab(s) orally once a day (at bedtime)  cefuroxime 250 mg oral tablet: 1 tab(s) orally every 12 hours until 6/20  Colace 100 mg oral capsule: 2 cap(s) orally once a day (at bedtime)  insulin glargine 100 units/mL subcutaneous solution: 6 unit(s) subcutaneous once a day (at bedtime)  lactobacillus acidophilus oral tablet: 1 tab(s) orally once a day  Melatonin 3 mg oral tablet: 1 tab(s) orally once a day (at bedtime)  mirtazapine 15 mg oral tablet: 1 tab(s) orally once a day (at bedtime)  Multiple Vitamins oral tablet: 1 tab(s) orally once a day  pantoprazole 40 mg oral delayed release tablet: 1 tab(s) orally once a day (before a meal)  Senna 8.6 mg oral tablet: 2 tab(s) orally once a day (at bedtime), As Needed  sertraline 50 mg oral tablet: 1 tab(s) orally once a day  traMADol 50 mg oral tablet: 0.5 tab(s) orally every 6 hours, As needed, Moderate Pain (4 - 6)

## 2022-06-16 NOTE — DISCHARGE NOTE PROVIDER - DETAILS OF MALNUTRITION DIAGNOSIS/DIAGNOSES
This patient has been assessed with a concern for Malnutrition and was treated during this hospitalization for the following Nutrition diagnosis/diagnoses:     -  06/13/2022: Severe protein-calorie malnutrition

## 2022-06-16 NOTE — DISCHARGE NOTE PROVIDER - NSDCCPCAREPLAN_GEN_ALL_CORE_FT
PRINCIPAL DISCHARGE DIAGNOSIS  Diagnosis: Brain TIA  Assessment and Plan of Treatment:   -pt. had a small stroke, known as TIA  -continue blood thinner, Eliquis and Statin      SECONDARY DISCHARGE DIAGNOSES  Diagnosis: Deep vein thrombosis (DVT)  Assessment and Plan of Treatment:   -continue Eliquis; blood thinner for at least 6 months     PRINCIPAL DISCHARGE DIAGNOSIS  Diagnosis: Brain TIA  Assessment and Plan of Treatment: -pt. had a small stroke, known as TIA  -continue statin  - found to have acute DVT of LLE, continue Eliquis for now and follow up with heme. Once off Eliquis, can resume asa alone        SECONDARY DISCHARGE DIAGNOSES  Diagnosis: Deep vein thrombosis (DVT)  Assessment and Plan of Treatment:   -continue Eliquis; blood thinner for at least 6 months

## 2022-06-16 NOTE — PROGRESS NOTE ADULT - ASSESSMENT
72F hx of CVA with dense L hemiparesis since 2003, HTN, HLD, hypothyroid, CKD3, T2DM on insulin, depression pw transient L facial droop.    # Probable TIA  -pt with new left facial droop now resolved  -pt had 2 CAT scans of the brain on this admission:  no acute bleed or ischemia, large chronic infarct at right cerebral artery vascular territory and right frontal lobe and medial right parietal lobe   -stopped ASA and Plavix due to hematuria--Neuro in agreement-rec to start ASA alone when eliquis   -noted lipid profile, LDL 86 HgbA1c - 6.1  -ECHO:  EF 50>55%, grade 1 diastolic dysfunction  -passed dysphagia screen  -Neurology consult appreciated- Dr. Mantilla following  -Carotid sono:  no significant stenosis  -PT eval : rec back to      # New DVT at left lower ext  -dopplers: +left common femoral vein acute DVT  -pt with CKD4 but started eliquis 5 BID (avoiding eliquis 10 BID as risk of bleeding seems to outweigh benefits)    #Hematuria  #Obstructive MAXIMILIAN on CKD4  #urinary retention  -patient having pain at rodriguez cath site--rodriguez removed and Nurse having difficulty replacing rodriguez due to pain  - Hgb stable   - Urology consult --rec staright caths prn -urine cx pending and Day 2 Ceftin  -cont bladder scans for now and straight cath prn  -pt with Creat of 1.01 in Feb, 2022, since April Creat ~3  -Nephrology consult appreciated, pt with obstructive MAXIMILIAN, cont gentle IVF   -U/A bland  -Renal sono:  prominent renal pelvis and fullness of collecting systems b/l R>L    #DM2  -HgbA1c 6.1  -cont Lantus, ISS, accuchecks    #HTN   not currently on meds.    #HLD  cont statin     #Leukocytosis:  resolving    #Hypothyroid  not currently on meds.   normal TSH.     #Depression   cont zoloft.     DVT ppx - heparin and coumadin bridge    #GOC:  DNR/DNI     Dispo: pt states she will update her Sister Valentine Neely [phone]. Plan to dc back to  if Hgb remains stable 24-48 hrs      72F hx of CVA with dense L hemiparesis since 2003, HTN, HLD, hypothyroid, CKD3, T2DM on insulin, depression pw transient L facial droop.    # Probable TIA  -pt with new left facial droop now resolved  -pt had 2 CAT scans of the brain on this admission:  no acute bleed or ischemia, large chronic infarct at right cerebral artery vascular territory and right frontal lobe and medial right parietal lobe   -stopped ASA and Plavix due to hematuria--Neuro in agreement-rec to start ASA alone when eliquis   -noted lipid profile, LDL 86 HgbA1c - 6.1  -ECHO:  EF 50>55%, grade 1 diastolic dysfunction  -passed dysphagia screen  -Neurology consult appreciated- Dr. Mantilla following  -Carotid sono:  no significant stenosis  -PT eval : rec back to      # New DVT at left lower ext  -dopplers: +left common femoral vein acute DVT  -pt with CKD4 but started eliquis 5 BID (avoiding eliquis 10 BID as risk of bleeding seems to outweigh benefits)    #Hematuria  #Obstructive MAXIMILIAN on CKD4  #urinary retention  -patient having pain at rodriguez cath site--rodriguez removed and Nurse having difficulty replacing rodriguez due to pain  - Hgb stable   - Urology consult --rec staright caths prn -urine cx pending and Day 2 Ceftin  -cont bladder scans for now and straight cath prn  -pt with Creat of 1.01 in Feb, 2022, now creatnine 2.3   -Nephrology consult appreciated, pt with obstructive MAXIMILIAN, cont gentle IVF   -U/A bland  -Renal sono:  prominent renal pelvis and fullness of collecting systems b/l R>L    #DM2  -HgbA1c 6.1  -cont Lantus, ISS, accuchecks    #HTN   not currently on meds.    #HLD  cont statin     #Leukocytosis:  resolving    #Hypothyroid  not currently on meds.   normal TSH.     #Depression   cont zoloft.     DVT ppx - heparin and coumadin bridge    #GOC:  DNR/DNI     Dispo: pt states she will update her Sister Valentine Neely [phone]. Plan to dc back to  if Hgb remains stable 24-48 hrs

## 2022-06-16 NOTE — DISCHARGE NOTE PROVIDER - HOSPITAL COURSE
72F hx of CVA with dense L hemiparesis since , HTN, HLD, hypothyroid, CKD3, T2DM on insulin, depression pw transient L facial droop. Pt related she had been well but while trying to move her L arm with her other hand, she believed she had a 'panic attack' as if the 'bed were swallowing her up'. She then screamed for help. She denied any associated HA, dizziness, worsening weakness, SOB, CP, diaphoresis or palps. Staff noted new L facial droop and drooling and pt was sent to ED. On arrival, L facial droop has completely resolved. In ED, afebrile P: 117  BP: 177/84 sat 99% on RA. WBC: 12.4 48% N. d-dimer: 1611 K:6  BUN/cr: 48/3.66 B trop: 11.  CT head no new changes .  Admitted to hospitalist.  Neuro consulted. CT head x 2 with chronic infarcts.  TTE w no thrombus.  Carotid sono no significant stenosis    Found to have LLE DVT. Initially started on hep gtt to coumadin but then transitioned to PO Eliquis 5 mg BID (avoided 10 mg BID for the week of initiation due to hematuria)    Urology consulted for hematuria.  Sono without stones or masses. Urine cx ____ and started empirically on ceftin for UTI.     Seen by S+S who rec solid w thin liquids    PT rec HIREN    Discharging provider:  Code STatus: DNR      **RESULTS**  < from: CT Head No Cont (22 @ 20:36) >    IMPRESSION:  No CT evidence of acute intracranial pathology.  No interval change from   2022 at 6:02 PM.    --- End of Report ---        < end of copied text >    < from: CT Head No Cont (22 @ 21:06) >    IMPRESSION:  No CT evidence of acute intracranial pathology.  I discussed the findings   with Dr. Mccabe on 2022 at 9:45 PM.  Read back verification was   obtained.    --- End of Report ---      < end of copied text >    < from: TTE Echo Complete w/o Contrast w/ Doppler (22 @ 08:14) >        Summary:   1. Left ventricular ejection fraction, by visual estimation, is 50 to   55%.   2.Normal global left ventricular systolic function.   3. Spectral Doppler shows impaired relaxation pattern of left   ventricular myocardial filling (Grade I diastolic dysfunction).   4. There is moderate concentric left ventricular hypertrophy.   5. Normal left atrial size.   6. Normal right atrial size.   7. Mild mitral valve regurgitation.   8. Thickening of the anterior and posterior mitral valve leaflets.   9. Mild tricuspid regurgitation.  10. Mild aortic regurgitation.    [id number] Jhaveri MD, [facility] , Electronically signed on   2022 at 12:38:20 PM    < end of copied text >     72F hx of CVA with dense L hemiparesis since , HTN, HLD, hypothyroid, CKD3, T2DM on insulin, depression pw transient L facial droop. Pt related she had been well but while trying to move her L arm with her other hand, she believed she had a 'panic attack' as if the 'bed were swallowing her up'. She then screamed for help. She denied any associated HA, dizziness, worsening weakness, SOB, CP, diaphoresis or palps. Staff noted new L facial droop and drooling and pt was sent to ED. On arrival, L facial droop has completely resolved. In ED, afebrile P: 117  BP: 177/84 sat 99% on RA. WBC: 12.4 48% N. d-dimer: 1611 K:6  BUN/cr: 48/3.66 B trop: 11.  CT head no new changes .  Admitted to hospitalist.  Neuro consulted. CT head x 2 with chronic infarcts.  TTE w no thrombus.  Carotid sono no significant stenosis    Found to have LLE DVT. Initially started on hep gtt to coumadin but then transitioned to PO Eliquis 5 mg BID (avoided 10 mg BID for the week of initiation due to hematuria)    Urology consulted for hematuria.  Sono without stones or masses. Urine cx sent, still pending results pt was started empirically on ceftin for UTI.     Seen by S+S who rec solid w thin liquids.    PT rec HIREN    Discharging provider:  CATIA Salas  [phone]  Code STatus: DNR      **RESULTS**  < from: CT Head No Cont (22 @ 20:36) >    IMPRESSION:  No CT evidence of acute intracranial pathology.  No interval change from   2022 at 6:02 PM.    --- End of Report ---        < end of copied text >    < from: CT Head No Cont (22 @ 21:06) >    IMPRESSION:  No CT evidence of acute intracranial pathology.  I discussed the findings   with Dr. Mccabe on 2022 at 9:45 PM.  Read back verification was   obtained.    --- End of Report ---      < end of copied text >    < from: TTE Echo Complete w/o Contrast w/ Doppler (22 @ 08:14) >        Summary:   1. Left ventricular ejection fraction, by visual estimation, is 50 to   55%.   2.Normal global left ventricular systolic function.   3. Spectral Doppler shows impaired relaxation pattern of left   ventricular myocardial filling (Grade I diastolic dysfunction).   4. There is moderate concentric left ventricular hypertrophy.   5. Normal left atrial size.   6. Normal right atrial size.   7. Mild mitral valve regurgitation.   8. Thickening of the anterior and posterior mitral valve leaflets.   9. Mild tricuspid regurgitation.  10. Mild aortic regurgitation.    [id number] Jhaveri MD, [facility] , Electronically signed on   2022 at 12:38:20 PM    < end of copied text >     72F hx of CVA with dense L hemiparesis since , HTN, HLD, hypothyroid, CKD3, T2DM on insulin, depression pw transient L facial droop. Pt related she had been well but while trying to move her L arm with her other hand, she believed she had a 'panic attack' as if the 'bed were swallowing her up'. She then screamed for help. She denied any associated HA, dizziness, worsening weakness, SOB, CP, diaphoresis or palps. Staff noted new L facial droop and drooling and pt was sent to ED. On arrival, L facial droop has completely resolved. In ED, afebrile P: 117  BP: 177/84 sat 99% on RA. WBC: 12.4 48% N. d-dimer: 1611 K:6  BUN/cr: 48/3.66 B trop: 11.  CT head no new changes .  Admitted to hospitalist.  Neuro consulted. CT head x 2 with chronic infarcts.  TTE w no thrombus.  Carotid sono no significant stenosis    Found to have LLE DVT. Initially started on hep gtt to coumadin but then transitioned to PO Eliquis 5 mg BID (avoided 10 mg BID for the week of initiation due to hematuria)    Urology consulted for hematuria.  Sono without stones or masses. Urine cx sent, still pending results pt was started empirically on ceftin for UTI.     Seen by S+S who rec solid w thin liquids.    PT rec Dr. Tracie MARADIAGA (notified)    Discharging provider:  CATIA Salas  [phone]  Code STatus: DNR      **RESULTS**  < from: CT Head No Cont (22 @ 20:36) >    IMPRESSION:  No CT evidence of acute intracranial pathology.  No interval change from   2022 at 6:02 PM.    --- End of Report ---        < end of copied text >    < from: CT Head No Cont (22 @ 21:06) >    IMPRESSION:  No CT evidence of acute intracranial pathology.  I discussed the findings   with Dr. Mccabe on 2022 at 9:45 PM.  Read back verification was   obtained.    --- End of Report ---      < end of copied text >    < from: TTE Echo Complete w/o Contrast w/ Doppler (22 @ 08:14) >        Summary:   1. Left ventricular ejection fraction, by visual estimation, is 50 to   55%.   2.Normal global left ventricular systolic function.   3. Spectral Doppler shows impaired relaxation pattern of left   ventricular myocardial filling (Grade I diastolic dysfunction).   4. There is moderate concentric left ventricular hypertrophy.   5. Normal left atrial size.   6. Normal right atrial size.   7. Mild mitral valve regurgitation.   8. Thickening of the anterior and posterior mitral valve leaflets.   9. Mild tricuspid regurgitation.  10. Mild aortic regurgitation.    [id number] Jhaveri MD, [facility] , Electronically signed on   2022 at 12:38:20 PM    < end of copied text >

## 2022-06-17 VITALS
RESPIRATION RATE: 16 BRPM | SYSTOLIC BLOOD PRESSURE: 112 MMHG | HEART RATE: 99 BPM | DIASTOLIC BLOOD PRESSURE: 60 MMHG | TEMPERATURE: 99 F | OXYGEN SATURATION: 99 %

## 2022-06-17 LAB
ANION GAP SERPL CALC-SCNC: 9 MMOL/L — SIGNIFICANT CHANGE UP (ref 5–17)
APTT BLD: 49 SEC — HIGH (ref 27.5–35.5)
BUN SERPL-MCNC: 39 MG/DL — HIGH (ref 7–23)
CALCIUM SERPL-MCNC: 7.9 MG/DL — LOW (ref 8.4–10.5)
CHLORIDE SERPL-SCNC: 108 MMOL/L — SIGNIFICANT CHANGE UP (ref 96–108)
CO2 SERPL-SCNC: 24 MMOL/L — SIGNIFICANT CHANGE UP (ref 22–31)
CREAT SERPL-MCNC: 2.2 MG/DL — HIGH (ref 0.5–1.3)
EGFR: 23 ML/MIN/1.73M2 — LOW
GLUCOSE BLDC GLUCOMTR-MCNC: 109 MG/DL — HIGH (ref 70–99)
GLUCOSE BLDC GLUCOMTR-MCNC: 97 MG/DL — SIGNIFICANT CHANGE UP (ref 70–99)
GLUCOSE SERPL-MCNC: 104 MG/DL — HIGH (ref 70–99)
HCT VFR BLD CALC: 29.3 % — LOW (ref 34.5–45)
HGB BLD-MCNC: 9.4 G/DL — LOW (ref 11.5–15.5)
INR BLD: 4.56 RATIO — HIGH (ref 0.88–1.16)
MCHC RBC-ENTMCNC: 27 PG — SIGNIFICANT CHANGE UP (ref 27–34)
MCHC RBC-ENTMCNC: 32.1 GM/DL — SIGNIFICANT CHANGE UP (ref 32–36)
MCV RBC AUTO: 84.2 FL — SIGNIFICANT CHANGE UP (ref 80–100)
NRBC # BLD: 0 /100 WBCS — SIGNIFICANT CHANGE UP (ref 0–0)
PLATELET # BLD AUTO: 268 K/UL — SIGNIFICANT CHANGE UP (ref 150–400)
POTASSIUM SERPL-MCNC: 3.9 MMOL/L — SIGNIFICANT CHANGE UP (ref 3.5–5.3)
POTASSIUM SERPL-SCNC: 3.9 MMOL/L — SIGNIFICANT CHANGE UP (ref 3.5–5.3)
PROTHROM AB SERPL-ACNC: 53.7 SEC — HIGH (ref 10.5–13.4)
RBC # BLD: 3.48 M/UL — LOW (ref 3.8–5.2)
RBC # FLD: 18 % — HIGH (ref 10.3–14.5)
SODIUM SERPL-SCNC: 141 MMOL/L — SIGNIFICANT CHANGE UP (ref 135–145)
WBC # BLD: 10.43 K/UL — SIGNIFICANT CHANGE UP (ref 3.8–10.5)
WBC # FLD AUTO: 10.43 K/UL — SIGNIFICANT CHANGE UP (ref 3.8–10.5)

## 2022-06-17 PROCEDURE — 93880 EXTRACRANIAL BILAT STUDY: CPT

## 2022-06-17 PROCEDURE — 83036 HEMOGLOBIN GLYCOSYLATED A1C: CPT

## 2022-06-17 PROCEDURE — 87635 SARS-COV-2 COVID-19 AMP PRB: CPT

## 2022-06-17 PROCEDURE — 87086 URINE CULTURE/COLONY COUNT: CPT

## 2022-06-17 PROCEDURE — 71045 X-RAY EXAM CHEST 1 VIEW: CPT

## 2022-06-17 PROCEDURE — 76775 US EXAM ABDO BACK WALL LIM: CPT

## 2022-06-17 PROCEDURE — 86803 HEPATITIS C AB TEST: CPT

## 2022-06-17 PROCEDURE — 36415 COLL VENOUS BLD VENIPUNCTURE: CPT

## 2022-06-17 PROCEDURE — 84443 ASSAY THYROID STIM HORMONE: CPT

## 2022-06-17 PROCEDURE — 81001 URINALYSIS AUTO W/SCOPE: CPT

## 2022-06-17 PROCEDURE — 97162 PT EVAL MOD COMPLEX 30 MIN: CPT

## 2022-06-17 PROCEDURE — 99285 EMERGENCY DEPT VISIT HI MDM: CPT

## 2022-06-17 PROCEDURE — 80048 BASIC METABOLIC PNL TOTAL CA: CPT

## 2022-06-17 PROCEDURE — 84145 PROCALCITONIN (PCT): CPT

## 2022-06-17 PROCEDURE — 85379 FIBRIN DEGRADATION QUANT: CPT

## 2022-06-17 PROCEDURE — 84484 ASSAY OF TROPONIN QUANT: CPT

## 2022-06-17 PROCEDURE — 93971 EXTREMITY STUDY: CPT

## 2022-06-17 PROCEDURE — 93306 TTE W/DOPPLER COMPLETE: CPT

## 2022-06-17 PROCEDURE — 99239 HOSP IP/OBS DSCHRG MGMT >30: CPT

## 2022-06-17 PROCEDURE — 85025 COMPLETE CBC W/AUTO DIFF WBC: CPT

## 2022-06-17 PROCEDURE — 85027 COMPLETE CBC AUTOMATED: CPT

## 2022-06-17 PROCEDURE — 70450 CT HEAD/BRAIN W/O DYE: CPT | Mod: MA

## 2022-06-17 PROCEDURE — 82962 GLUCOSE BLOOD TEST: CPT

## 2022-06-17 PROCEDURE — 85730 THROMBOPLASTIN TIME PARTIAL: CPT

## 2022-06-17 PROCEDURE — 80061 LIPID PANEL: CPT

## 2022-06-17 PROCEDURE — 97166 OT EVAL MOD COMPLEX 45 MIN: CPT

## 2022-06-17 PROCEDURE — 92610 EVALUATE SWALLOWING FUNCTION: CPT

## 2022-06-17 PROCEDURE — 93005 ELECTROCARDIOGRAM TRACING: CPT

## 2022-06-17 PROCEDURE — 85610 PROTHROMBIN TIME: CPT

## 2022-06-17 PROCEDURE — 80053 COMPREHEN METABOLIC PANEL: CPT

## 2022-06-17 PROCEDURE — 87186 SC STD MICRODIL/AGAR DIL: CPT

## 2022-06-17 RX ORDER — APIXABAN 2.5 MG/1
1 TABLET, FILM COATED ORAL
Qty: 0 | Refills: 0 | DISCHARGE
Start: 2022-06-17

## 2022-06-17 RX ORDER — INSULIN GLARGINE 100 [IU]/ML
8 INJECTION, SOLUTION SUBCUTANEOUS
Qty: 0 | Refills: 0 | DISCHARGE

## 2022-06-17 RX ORDER — INSULIN LISPRO 100/ML
0 VIAL (ML) SUBCUTANEOUS
Qty: 0 | Refills: 0 | DISCHARGE

## 2022-06-17 RX ORDER — AMLODIPINE BESYLATE 2.5 MG/1
1 TABLET ORAL
Qty: 0 | Refills: 0 | DISCHARGE
Start: 2022-06-17

## 2022-06-17 RX ORDER — METFORMIN HYDROCHLORIDE 850 MG/1
1 TABLET ORAL
Qty: 0 | Refills: 0 | DISCHARGE

## 2022-06-17 RX ORDER — CEFUROXIME AXETIL 250 MG
1 TABLET ORAL
Qty: 0 | Refills: 0 | DISCHARGE
Start: 2022-06-17

## 2022-06-17 RX ORDER — TRAMADOL HYDROCHLORIDE 50 MG/1
0.5 TABLET ORAL
Qty: 0 | Refills: 0 | DISCHARGE
Start: 2022-06-17

## 2022-06-17 RX ORDER — ATORVASTATIN CALCIUM 80 MG/1
1 TABLET, FILM COATED ORAL
Qty: 0 | Refills: 0 | DISCHARGE
Start: 2022-06-17

## 2022-06-17 RX ORDER — ROSUVASTATIN CALCIUM 5 MG/1
1 TABLET ORAL
Qty: 0 | Refills: 0 | DISCHARGE

## 2022-06-17 RX ORDER — PANTOPRAZOLE SODIUM 20 MG/1
1 TABLET, DELAYED RELEASE ORAL
Qty: 0 | Refills: 0 | DISCHARGE
Start: 2022-06-17

## 2022-06-17 RX ORDER — INSULIN GLARGINE 100 [IU]/ML
6 INJECTION, SOLUTION SUBCUTANEOUS
Qty: 0 | Refills: 0 | DISCHARGE
Start: 2022-06-17

## 2022-06-17 RX ADMIN — Medication 1 TABLET(S): at 10:47

## 2022-06-17 RX ADMIN — Medication 250 MILLIGRAM(S): at 05:08

## 2022-06-17 RX ADMIN — Medication 1 DROP(S): at 05:08

## 2022-06-17 RX ADMIN — AMLODIPINE BESYLATE 5 MILLIGRAM(S): 2.5 TABLET ORAL at 05:08

## 2022-06-17 RX ADMIN — APIXABAN 5 MILLIGRAM(S): 2.5 TABLET, FILM COATED ORAL at 05:08

## 2022-06-17 RX ADMIN — SERTRALINE 50 MILLIGRAM(S): 25 TABLET, FILM COATED ORAL at 10:46

## 2022-06-17 RX ADMIN — POLYETHYLENE GLYCOL 3350 17 GRAM(S): 17 POWDER, FOR SOLUTION ORAL at 10:46

## 2022-06-17 RX ADMIN — Medication 1 TABLET(S): at 10:46

## 2022-06-17 RX ADMIN — PANTOPRAZOLE SODIUM 40 MILLIGRAM(S): 20 TABLET, DELAYED RELEASE ORAL at 05:10

## 2022-06-17 NOTE — PROGRESS NOTE ADULT - ASSESSMENT
72F hx of CVA with dense L hemiparesis since 2003, HTN, HLD, hypothyroid, CKD3, T2DM on insulin, depression pw transient L facial droop.    # Probable TIA  -pt with new left facial droop now resolved  -pt had 2 CAT scans of the brain on this admission:  no acute bleed or ischemia, large chronic infarct at right cerebral artery vascular territory and right frontal lobe and medial right parietal lobe   -stopped ASA and Plavix due to hematuria--Neuro in agreement-rec to start ASA alone when on eliquis   -noted lipid profile, LDL 86 HgbA1c - 6.1  -ECHO:  EF 50>55%, grade 1 diastolic dysfunction  -passed dysphagia screen  -Neurology consult appreciated- Dr. Mantilla following  -Carotid sono:  no significant stenosis  -PT eval : rec back to      # New DVT at left lower ext  -dopplers: +left common femoral vein acute DVT  -pt with CKD4 but started eliquis 5 BID (avoiding eliquis 10 BID as risk of bleeding seems to outweigh benefits)    #Supratherapeutic INR  -INR 4.56  -pt not on Coumadin anymore, currently on Apixaban  -can follow INR as outpatient    #Hematuria; resolved  #Obstructive MAXIMILIAN on CKD4  #urinary retention  - Hgb stable   - Urology consulted --rec bladder scans/straight caths prn -urine cx pending and Day 3 of Ceftin  - pt with Creat of 1.01 in Feb, 2022, Creat 2.20 today  - Nephrology consult appreciated, pt with obstructive MAXIMILIAN, cont gentle IVF   - Renal sono:  prominent renal pelvis and fullness of collecting systems b/l R>L    #DM2  -HgbA1c 6.1  -cont Lantus, ISS, accuchecks    #HTN   not currently on meds.    #HLD  cont statin     #Leukocytosis; resolving    #Hypothyroid  not currently on meds.   normal TSH.     #Depression   cont zoloft.     DVT ppx -on Apixaban    #GOC:  DNR/DNI     Dispo: pt states she will update her Sister Valentine Neely [phone]. Plan to dc back to .

## 2022-06-17 NOTE — DISCHARGE NOTE NURSING/CASE MANAGEMENT/SOCIAL WORK - NSDCPEFALRISK_GEN_ALL_CORE
For information on Fall & Injury Prevention, visit: [email or web address] OR  [email or web address] OR  https://www.cdc.gov/steadi/patient.html

## 2022-06-17 NOTE — PROGRESS NOTE ADULT - NS ATTEND AMEND GEN_ALL_CORE FT
Pt seen and examined at bedside.  No acute events overnight  Pt denies cp, palpitations, sob, abd pain, N/V, fever, chills  Pt w/ concern for possible TIA as transient facial droop  CT Head negative for acute process  Course complicated by + LE Dopplers with + DVT in common femoral vein  Start Heparin gtt as pt with CKD IV  Follow up with repeat CT Head in 24hrs (later tonight)  BP improving, however, still SBP>150. Start Amlodipine as concerned for ICH after starting Heparin gtt while uncontrolled hypertensive    Continue to monitor vitals, neurochecks
Pt seen and examined at bedside.  No acute events overnight  Pt denies cp, palpitations, sob, abd pain, N/V, fever, chills  Pt w/ concern for possible TIA as transient facial droop  CT Head negative for acute process  Course complicated by + LE Dopplers with + DVT in common femoral vein  Continue Heparin gtt as pt with CKD IV  Repeat CT Head stable    Continue to monitor vitals, neurochecks
Pt seen and examined at bedside.  No acute events overnight  Pt denies cp, palpitations, sob, abd pain, N/V, fever, chills  Pt w/ concern for possible TIA as transient facial droop  CT Head negative for acute process  Course complicated by + LE Dopplers with + DVT in common femoral vein  Continue Heparin gtt  w/ Coumadin bridge as pt with CKD IV  Repeat CT Head stable  Complication of Hematuria. Urology consulted    Continue to monitor vitals, neurochecks
Pt seen and examined at bedside.  No acute events overnight  Pt denies cp, palpitations, sob, abd pain, N/V, fever, chills  Pt w/ concern for possible TIA as transient facial droop  CT Head negative for acute process  Course complicated by + LE Dopplers with + DVT in common femoral vein  Continue Heparin gtt  w/ Coumadin bridge as pt with CKD IV. Increase dose of Coumadin  Repeat CT Head stable  Complication of Hematuria. Discussed case with Urology, Dr. Hill; Likely UTI related  Continue Ceftin. Follow up with urine cx    Continue to monitor vitals
72F hx of CVA with dense L hemiparesis since 2003, HTN, HLD, hypothyroid, CKD3, T2DM on insulin, depression pw transient L facial droop.    #TIA  - L sided facial droop now resolved  - was on asa and plavix and was stopped due to hematuria which is now resolved  - can restart asa alone as per neuro once completes course of Eliquis for acute DVT  - PT recommend back to , medically stable for dc    #new DVT of LLE  #supratherapeutic INR  - started Eliquis 5mg BID (higher risk of bleeding with 10mg BID)  - continue for at least 3 months and follow up with onc outpatient   - follow up INR at - likely elevated in setting of coumadin, now on Elquis     #hematuria  - resolved  - continue ceftin for now, completed 2 days so far- continue for 5 days total  - UCx with E. coli, s&s pending    dc to , medically stable
Pt seen and examined at bedside.  No acute events overnight  Pt denies cp, palpitations, sob, abd pain, N/V, fever, chills  Pt w/ concern for possible TIA as transient facial droop  CT Head negative for acute process  Course complicated by + LE Dopplers with + DVT in common femoral vein  Repeat CT Head stable  Discussed case with Pharmacy; Okay to transition to NOAC. Start Eliquis. Considering renal dysfunction as well as hematuria, risks of loading with Eliquis 10mg BID outweighs its benefits  Start Eliquis 5mg BID   Requiring straight caths for urinary retention  Urology on board. Monitor H/H in the setting of hematuria  Continue Ceftin. Follow up with urine cx    Continue to monitor vitals

## 2022-06-17 NOTE — CHART NOTE - NSCHARTNOTEFT_GEN_A_CORE
Nutrition Follow Up Note  Hospital Course (Per Electronic Medical Record):   Source: Medical Record [X] Patient [X] Nursing Staff [X]     Diet: Consistent Carbohydrate, Bite Size , Nepro TID     Patient reports tolerating her diet , consuming 50-75% of meal as per flow sheet , Patient receiving po Nepro supplement dx of severe protein calorie malnutrition , patient reports consuming supplement , No GI issues reported . Labs noted , Cr trending down , POCT reviewed , insulin regimen noted     Current Weight: (6/17) 122.5/55.6kg - difficult to assess weight status due to various follow up weights                          (6/16) 115.3/52.3kg                          (6/15) 113.7/51.6kg     Pertinent Medications: MEDICATIONS  (STANDING):  amLODIPine   Tablet 5 milliGRAM(s) Oral daily  apixaban 5 milliGRAM(s) Oral two times a day  artificial  tears Solution 1 Drop(s) Both EYES two times a day  atorvastatin 80 milliGRAM(s) Oral at bedtime  cefuroxime   Tablet 250 milliGRAM(s) Oral every 12 hours  dextrose 5%. 1000 milliLiter(s) (100 mL/Hr) IV Continuous <Continuous>  dextrose 5%. 1000 milliLiter(s) (50 mL/Hr) IV Continuous <Continuous>  dextrose 50% Injectable 25 Gram(s) IV Push once  dextrose 50% Injectable 12.5 Gram(s) IV Push once  dextrose 50% Injectable 25 Gram(s) IV Push once  glucagon  Injectable 1 milliGRAM(s) IntraMuscular once  hydrocortisone 2.5% Rectal Cream 1 Application(s) Rectal at bedtime  insulin glargine Injectable (LANTUS) 6 Unit(s) SubCutaneous at bedtime  insulin lispro (ADMELOG) corrective regimen sliding scale   SubCutaneous three times a day before meals  insulin lispro (ADMELOG) corrective regimen sliding scale   SubCutaneous at bedtime  lactobacillus acidophilus 1 Tablet(s) Oral daily  melatonin 3 milliGRAM(s) Oral at bedtime  mirtazapine 15 milliGRAM(s) Oral at bedtime  multivitamin 1 Tablet(s) Oral daily  pantoprazole    Tablet 40 milliGRAM(s) Oral before breakfast  polyethylene glycol 3350 17 Gram(s) Oral daily  senna 2 Tablet(s) Oral at bedtime  sertraline 50 milliGRAM(s) Oral daily  sodium chloride 0.45%. 1000 milliLiter(s) (60 mL/Hr) IV Continuous <Continuous>    MEDICATIONS  (PRN):  acetaminophen     Tablet .. 650 milliGRAM(s) Oral every 8 hours PRN Temp greater or equal to 38C (100.4F), Mild Pain (1 - 3)  dextrose Oral Gel 15 Gram(s) Oral once PRN Blood Glucose LESS THAN 70 milliGRAM(s)/deciliter  traMADol 25 milliGRAM(s) Oral every 6 hours PRN Moderate Pain (4 - 6)      Pertinent Labs:  06-17 Na141 mmol/L Glu 104 mg/dL<H> K+ 3.9 mmol/L Cr  2.20 mg/dL<H> BUN 39 mg/dL<H> 06-12 Alb 3.0 g/dL<L> 06-12 Chol 145 mg/dL LDL --    HDL 43 mg/dL<L> Trig 83 mg/dL        Skin: intact    Edema: none    Last BM: (6/16)     Estimated Needs:   [X] No Change since Previous Assessment    Previous Nutrition Diagnosis: Severe Protein Calorie Malnutrition     Nutrition Diagnosis is [X] Ongoing         New Nutrition Diagnosis: [X] Not Applicable      Interventions:   1. continue current diet .     Monitoring & Evaluation: will monitor:  [X] Weights   [X] PO Intake   [X] Follow Up (Per Protocol)  [X] Tolerance to Diet Prescription       RD to follow as per Nutrition protocol  Sakina Horn RDN

## 2022-06-17 NOTE — DISCHARGE NOTE NURSING/CASE MANAGEMENT/SOCIAL WORK - PATIENT PORTAL LINK FT
You can access the FollowMyHealth Patient Portal offered by [facility] by registering at the following website: [email or web address]. By joining [employer]’s FollowMyHealth portal, you will also be able to view your health information using other applications (apps) compatible with our system.

## 2022-06-17 NOTE — PROGRESS NOTE ADULT - NUTRITIONAL ASSESSMENT
This patient has been assessed with a concern for Malnutrition and has been determined to have a diagnosis/diagnoses of Severe protein-calorie malnutrition.    This patient is being managed with:   Diet Consistent Carbohydrate Renal w/Evening Snack-  Soft and Bite Sized (SOFTBTSZ)  Supplement Feeding Modality:  Oral  Nepro Cans or Servings Per Day:  1       Frequency:  Three Times a day  Entered: Jun 13 2022 11:12AM    
This patient has been assessed with a concern for Malnutrition and has been determined to have a diagnosis/diagnoses of Severe protein-calorie malnutrition.    This patient is being managed with:   Diet Consistent Carbohydrate Renal w/Evening Snack-  Soft and Bite Sized (SOFTBTSZ)  Supplement Feeding Modality:  Oral  Nepro Cans or Servings Per Day:  1       Frequency:  Three Times a day  Entered: Jun 13 2022 11:12AM    Diet Consistent Carbohydrate Renal w/Evening Snack-  Soft and Bite Sized (SOFTBTSZ)  Entered: Jun 11 2022  9:33PM    The following pending diet order is being considered for treatment of Severe protein-calorie malnutrition:null

## 2022-06-17 NOTE — PHARMACOTHERAPY INTERVENTION NOTE - COMMENTS
Pt now on apixaban. Counseled on reason for use, directions for use and possible side effects. Pt verbalized understanding, all questions answered.

## 2022-06-17 NOTE — PROGRESS NOTE ADULT - PROVIDER SPECIALTY LIST ADULT
Neurology
Nephrology
Hospitalist
Hospitalist
Nephrology
Neurology
Neurology
Hospitalist

## 2022-06-17 NOTE — PROGRESS NOTE ADULT - SUBJECTIVE AND OBJECTIVE BOX
No distress    Vital Signs Last 24 Hrs  T(C): 36.9 (22 @ 20:00), Max: 36.9 (22 @ 20:00)  T(F): 98.5 (22 @ 20:00), Max: 98.5 (22 @ 20:00)  HR: 92 (22 @ 20:00) (88 - 110)  BP: 132/77 (22 @ 20:00) (114/71 - 158/64)  RR: 14 (22 @ 20:00) (13 - 20)  SpO2: 100% (22 @ 20:00) (100% - 100%)    I&O's Detail    2022 07:01  -  2022 22:17  --------------------------------------------------------  OUT:    Indwelling Catheter - Urethral (mL): 570 mL  Total OUT: 570 mL    s1s2  b/l air entry  soft, ND  no edema                        11.2   12.26 )-----------( 320      ( 2022 06:44 )             35.3     2022 06:44    144    |  112    |  47     ----------------------------<  93     4.8     |  22     |  3.46     Ca    8.9        2022 06:44    TPro  7.6    /  Alb  3.0    /  TBili  0.3    /  DBili  x      /  AST  17     /  ALT  9      /  AlkPhos  75     2022 06:25    LIVER FUNCTIONS - ( 2022 06:25 )  Alb: 3.0 g/dL / Pro: 7.6 g/dL / ALK PHOS: 75 U/L / ALT: 9 U/L / AST: 17 U/L / GGT: x           PT/INR - ( 2022 06:44 )   PT: 13.4 sec;   INR: 1.15 ratio      Urinalysis Basic - ( 2022 05:55 )    Color: Yellow / Appearance: Clear / S.010 / pH: x  Gluc: x / Ketone: Negative  / Bili: Negative / Urobili: Negative   Blood: x / Protein: 15 / Nitrite: Negative   Leuk Esterase: Moderate / RBC: 0-4 /HPF / WBC 6-10 /HPF   Sq Epi: x / Non Sq Epi: Neg.-Few / Bacteria: Few /HPF    acetaminophen     Tablet .. 650 milliGRAM(s) Oral every 8 hours PRN  amLODIPine   Tablet 5 milliGRAM(s) Oral daily  artificial  tears Solution 1 Drop(s) Both EYES two times a day  atorvastatin 80 milliGRAM(s) Oral at bedtime  dextrose 5%. 1000 milliLiter(s) IV Continuous <Continuous>  dextrose 5%. 1000 milliLiter(s) IV Continuous <Continuous>  dextrose 50% Injectable 25 Gram(s) IV Push once  dextrose 50% Injectable 12.5 Gram(s) IV Push once  dextrose 50% Injectable 25 Gram(s) IV Push once  dextrose Oral Gel 15 Gram(s) Oral once PRN  glucagon  Injectable 1 milliGRAM(s) IntraMuscular once  heparin   Injectable 5000 Unit(s) IV Push every 6 hours PRN  heparin   Injectable 2500 Unit(s) IV Push every 6 hours PRN  heparin  Infusion.  Unit(s)/Hr IV Continuous <Continuous>  hydrocortisone 2.5% Rectal Cream 1 Application(s) Rectal at bedtime  insulin glargine Injectable (LANTUS) 6 Unit(s) SubCutaneous at bedtime  insulin lispro (ADMELOG) corrective regimen sliding scale   SubCutaneous three times a day before meals  insulin lispro (ADMELOG) corrective regimen sliding scale   SubCutaneous at bedtime  lactobacillus acidophilus 1 Tablet(s) Oral daily  melatonin 3 milliGRAM(s) Oral at bedtime  mirtazapine 15 milliGRAM(s) Oral at bedtime  multivitamin 1 Tablet(s) Oral daily  pantoprazole    Tablet 40 milliGRAM(s) Oral before breakfast  polyethylene glycol 3350 17 Gram(s) Oral daily  senna 2 Tablet(s) Oral at bedtime  sertraline 50 milliGRAM(s) Oral daily    Impression/Plan:    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      
Patient is a 72y old  Female who presents with a chief complaint of TIA (16 Jun 2022 21:21)      Patient seen and examined at bedside. No overnight events reported.  She reports no hematuria.      ALLERGIES:  No Known Allergies    MEDICATIONS  (STANDING):  amLODIPine   Tablet 5 milliGRAM(s) Oral daily  apixaban 5 milliGRAM(s) Oral two times a day  artificial  tears Solution 1 Drop(s) Both EYES two times a day  atorvastatin 80 milliGRAM(s) Oral at bedtime  cefuroxime   Tablet 250 milliGRAM(s) Oral every 12 hours  dextrose 5%. 1000 milliLiter(s) (100 mL/Hr) IV Continuous <Continuous>  dextrose 5%. 1000 milliLiter(s) (50 mL/Hr) IV Continuous <Continuous>  dextrose 50% Injectable 25 Gram(s) IV Push once  dextrose 50% Injectable 12.5 Gram(s) IV Push once  dextrose 50% Injectable 25 Gram(s) IV Push once  glucagon  Injectable 1 milliGRAM(s) IntraMuscular once  hydrocortisone 2.5% Rectal Cream 1 Application(s) Rectal at bedtime  insulin glargine Injectable (LANTUS) 6 Unit(s) SubCutaneous at bedtime  insulin lispro (ADMELOG) corrective regimen sliding scale   SubCutaneous three times a day before meals  insulin lispro (ADMELOG) corrective regimen sliding scale   SubCutaneous at bedtime  lactobacillus acidophilus 1 Tablet(s) Oral daily  melatonin 3 milliGRAM(s) Oral at bedtime  mirtazapine 15 milliGRAM(s) Oral at bedtime  multivitamin 1 Tablet(s) Oral daily  pantoprazole    Tablet 40 milliGRAM(s) Oral before breakfast  polyethylene glycol 3350 17 Gram(s) Oral daily  senna 2 Tablet(s) Oral at bedtime  sertraline 50 milliGRAM(s) Oral daily  sodium chloride 0.45%. 1000 milliLiter(s) (60 mL/Hr) IV Continuous <Continuous>    MEDICATIONS  (PRN):  acetaminophen     Tablet .. 650 milliGRAM(s) Oral every 8 hours PRN Temp greater or equal to 38C (100.4F), Mild Pain (1 - 3)  dextrose Oral Gel 15 Gram(s) Oral once PRN Blood Glucose LESS THAN 70 milliGRAM(s)/deciliter  traMADol 25 milliGRAM(s) Oral every 6 hours PRN Moderate Pain (4 - 6)    Vital Signs Last 24 Hrs  T(F): 97.7 (17 Jun 2022 08:26), Max: 98.5 (16 Jun 2022 20:39)  HR: 98 (17 Jun 2022 08:26) (85 - 98)  BP: 110/64 (17 Jun 2022 08:26) (110/64 - 144/75)  RR: 15 (17 Jun 2022 08:26) (14 - 20)  SpO2: 100% (17 Jun 2022 08:26) (98% - 100%)  I&O's Summary    16 Jun 2022 07:01  -  17 Jun 2022 07:00  --------------------------------------------------------  IN: 0 mL / OUT: 750 mL / NET: -750 mL      PHYSICAL EXAM:  General: NAD, A/O x 3  ENT: No gross hearing impairment, Moist mucous membranes, no thrush  Neck: Supple, No JVD  Lungs: Clear to auscultation bilaterally, good air entry, non-labored breathing  Cardio: RRR, S1/S2, No murmur  Abdomen: Soft, Nontender, Nondistended; Bowel sounds present  Extremities: No calf tenderness, No cyanosis, No pitting edema  Neuro:  left upper ext with deformity contracture at hand, speech fluent, left sided weakness    LABS:                        9.4    10.43 )-----------( 268      ( 17 Jun 2022 06:25 )             29.3     06-17    141  |  108  |  39  ----------------------------<  104  3.9   |  24  |  2.20    Ca    7.9      17 Jun 2022 06:25            PT/INR - ( 17 Jun 2022 06:25 )   PT: 53.7 sec;   INR: 4.56 ratio         PTT - ( 17 Jun 2022 06:25 )  PTT:49.0 sec          06-12 Chol 145 mg/dL LDL -- HDL 43 mg/dL Trig 83 mg/dL              POCT Blood Glucose.: 97 mg/dL (17 Jun 2022 07:50)  POCT Blood Glucose.: 174 mg/dL (16 Jun 2022 21:12)  POCT Blood Glucose.: 115 mg/dL (16 Jun 2022 17:20)  POCT Blood Glucose.: 139 mg/dL (16 Jun 2022 12:44)          COVID-19 PCR: NotDetec (06-11-22 @ 18:10)    RADIOLOGY & ADDITIONAL TESTS:    Care Discussed with Consultants/Other Providers:   
    Patient is a 72 year old woman admitted 6/11/22. She was noted at the SNF where she resides to have a new left facial droop after she complained of HA. She was not noted to hae the left facial droop on admission. She had residual left hemiplegia from a prior stroke.  Later in the evening had tachycardia in the 150s and noted not have pronounced left facial weakness. Telestroke was called and advised plavix in addition to ASA. Since then she denies HA or other new neurological complaints. She was found to have a left lower extremity DVT and has been started on coumadin with heparin as bridge. Today,Wednesday, she denies HA or other neurological complaints.    PMH: As above, S/P CVA with residual left hemiplegia 2013           HTN           HLD           Diabetes           CKD           Hypothyroid           Depression    SH: No allergy    Exam: Awake, alert, appropriate            Speech- without dysarthria            Pupils 2.5mm, EOM intact, no nystagmus, VFF           CN II-XII intact except Left VII-slight delay on left o smiling, slight flattening on left compared to right           Motor tone and strength            RUE 5/5        LUE 0/5, maintains flexed at elbow and wrist                                                           RLE  5/5        LLE 0/5                          11.2   12.26 )-----------( 320      ( 13 Jun 2022 06:44 )             35.3     06-13    144  |  112<H>  |  47<H>  ----------------------------<  93  4.8   |  22  |  3.46<H>    Ca    8.9      13 Jun 2022 06:44    TPro  7.6  /  Alb  3.0<L>  /  TBili  0.3  /  DBili  x   /  AST  17  /  ALT  9<L>  /  AlkPhos  75  06-12        < from: TTE Echo Complete w/o Contrast w/ Doppler (06.12.22 @ 08:14) >      Summary:   1. Left ventricular ejection fraction, by visual estimation, is 50 to   55%.   2.Normal global left ventricular systolic function.   3. Spectral Doppler shows impaired relaxation pattern of left   ventricular myocardial filling (Grade I diastolic dysfunction).   4. There is moderate concentric left ventricular hypertrophy.   5. Normal left atrial size.   6. Normal right atrial size.   7. Mild mitral valve regurgitation.   8. Thickening of the anterior and posterior mitral valve leaflets.   9. Mild tricuspid regurgitation.  10. Mild aortic regurgitation.            < from: US Duplex Carotid Arteries Complete, Bilateral (06.14.22 @ 10:06) >    Antegrade flow is noted within both vertebral arteries.    IMPRESSION: No significant hemodynamic stenosis of either carotid artery.    Measurement of carotid stenosis is based on velocity parameters that   correlate the residual internal carotid diameter with that of the more   distal vessel in accordance with a method suchas the [place] American   Symptomatic Carotid Endarterectomy Trial (NASCET).                  < from: CT Head No Cont (06.12.22 @ 20:36) >    COMPARISON STUDY: 06/11/2022 at 9:06 PM and 6:02 PM.    FINDINGS:  There is no CT evidence of acute intracranial hemorrhage, mass effect,   midline shift or acute territorial infarct.    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 Small chronic   appearing infarcts in the right corona radiata, right caudate nucleus and   right lentiform nucleus are stable. Ex vacuo dilatation of the right   lateral ventricle is stable.    There is mild generalized cerebral volume loss and mild to moderate   patchy periventricular white matter hypoattenuation compatible with   chronic microvascular ischemic disease.      There is minimal patchy paranasal sinus mucosal thickening. Trace   secretions within the posterior right ethmoid air cell may represent   trapped secretions versus sinusitis.    The mastoids are clear bilaterally.    The patient is status post cataract lens placement surgery bilaterally.    The calvarium and skull base appear within normal limits.    IMPRESSION:  No CT evidence of acute intracranial pathology.  No interval change from   06/11/2022 at 6:02 PM.                                                
    Patient is a 72 year old woman admitted 6/11/22. She was noted at the SNF where she resides to have a new left facial droop after she complained of HA. She was not noted to hae the left facial droop on admission. She had residual left hemiplegia from a prior stroke.  Later in the evening had tachycardia in the 150s and noted not have pronounced left facial weakness. Telestroke was called and advised plavix in addition to ASA. Since then she denies HA or other new neurological complaints. She was found to have a left lower extremity DVT and has been started on coumadin with heparin as bridge.Today, Tuesday, she denies HA or other new neurological complaints.    PMH: As above, S/P CVA with residual left hemiplegia 2013           HTN           HLD           Diabetes           CKD           Hypothyroid           Depression    SH: No allergy    Exam: Awake, alert, appropriate            Speech- without dysarthria            Pupils 2.5mm, EOM intact, no nystagmus, VFF           CN II-XII intact except Left VII-slight delay on left o smiling, slight flattening on left compared to right           Motor tone and strength            RUE 5/5        LUE 0/5, maintains flexed at elbow and wrist                                                           RLE  5/5        LLE 0/5                          11.2   12.26 )-----------( 320      ( 13 Jun 2022 06:44 )             35.3     06-13    144  |  112<H>  |  47<H>  ----------------------------<  93  4.8   |  22  |  3.46<H>    Ca    8.9      13 Jun 2022 06:44    TPro  7.6  /  Alb  3.0<L>  /  TBili  0.3  /  DBili  x   /  AST  17  /  ALT  9<L>  /  AlkPhos  75  06-12        < from: TTE Echo Complete w/o Contrast w/ Doppler (06.12.22 @ 08:14) >      Summary:   1. Left ventricular ejection fraction, by visual estimation, is 50 to   55%.   2.Normal global left ventricular systolic function.   3. Spectral Doppler shows impaired relaxation pattern of left   ventricular myocardial filling (Grade I diastolic dysfunction).   4. There is moderate concentric left ventricular hypertrophy.   5. Normal left atrial size.   6. Normal right atrial size.   7. Mild mitral valve regurgitation.   8. Thickening of the anterior and posterior mitral valve leaflets.   9. Mild tricuspid regurgitation.  10. Mild aortic regurgitation.        < from: US Duplex Carotid Arteries Complete, Bilateral (06.14.22 @ 10:06) >    IMPRESSION: No significant hemodynamic stenosis of either carotid artery.    Measurement of carotid stenosis is based on velocity parameters that   correlate the residual internal carotid diameter with that of the more   distal vessel in accordance with a method suchas the [place] American   Symptomatic Carotid Endarterectomy Trial (NASCET).                  < from: CT Head No Cont (06.12.22 @ 20:36) >    COMPARISON STUDY: 06/11/2022 at 9:06 PM and 6:02 PM.    FINDINGS:  There is no CT evidence of acute intracranial hemorrhage, mass effect,   midline shift or acute territorial infarct.    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 Small chronic   appearing infarcts in the right corona radiata, right caudate nucleus and   right lentiform nucleus are stable. Ex vacuo dilatation of the right   lateral ventricle is stable.    There is mild generalized cerebral volume loss and mild to moderate   patchy periventricular white matter hypoattenuation compatible with   chronic microvascular ischemic disease.      There is minimal patchy paranasal sinus mucosal thickening. Trace   secretions within the posterior right ethmoid air cell may represent   trapped secretions versus sinusitis.    The mastoids are clear bilaterally.    The patient is status post cataract lens placement surgery bilaterally.    The calvarium and skull base appear within normal limits.    IMPRESSION:  No CT evidence of acute intracranial pathology.  No interval change from   06/11/2022 at 6:02 PM.                                                
Resting    Vital Signs Last 24 Hrs  T(C): 36.6 (06-15-22 @ 20:19), Max: 36.7 (06-15-22 @ 12:02)  T(F): 97.8 (06-15-22 @ 20:19), Max: 98 (06-15-22 @ 12:02)  HR: 90 (06-15-22 @ 20:19) (83 - 90)  BP: 117/54 (06-15-22 @ 20:19) (117/54 - 152/70)  RR: 16 (06-15-22 @ 20:19) (12 - 18)  SpO2: 100% (06-15-22 @ 20:19) (99% - 100%)    I&O's Detail    14 Jun 2022 07:01  -  15 Adalberto 2022 07:00  --------------------------------------------------------  OUT:    Indwelling Catheter - Urethral (mL): 500 mL    Intermittent Catheterization - Urethral (mL): 1725 mL  Total OUT: 2225 mL    s1s2  b/l air entry  soft, ND  no edema                              10.6   11.43 )-----------( 288      ( 15 Adalberto 2022 06:50 )             33.3     15 Adalberto 2022 06:50    141    |  107    |  38     ----------------------------<  87     4.2     |  24     |  2.52     Ca    7.8        15 Adalberto 2022 06:50    acetaminophen     Tablet .. 650 milliGRAM(s) Oral every 8 hours PRN  amLODIPine   Tablet 5 milliGRAM(s) Oral daily  artificial  tears Solution 1 Drop(s) Both EYES two times a day  atorvastatin 80 milliGRAM(s) Oral at bedtime  cefuroxime   Tablet 250 milliGRAM(s) Oral every 12 hours  dextrose 5%. 1000 milliLiter(s) IV Continuous <Continuous>  dextrose 5%. 1000 milliLiter(s) IV Continuous <Continuous>  dextrose 50% Injectable 25 Gram(s) IV Push once  dextrose 50% Injectable 12.5 Gram(s) IV Push once  dextrose 50% Injectable 25 Gram(s) IV Push once  dextrose Oral Gel 15 Gram(s) Oral once PRN  glucagon  Injectable 1 milliGRAM(s) IntraMuscular once  heparin   Injectable 5000 Unit(s) IV Push every 6 hours PRN  heparin   Injectable 2500 Unit(s) IV Push every 6 hours PRN  heparin  Infusion. 800 Unit(s)/Hr IV Continuous <Continuous>  hydrocortisone 2.5% Rectal Cream 1 Application(s) Rectal at bedtime  insulin glargine Injectable (LANTUS) 6 Unit(s) SubCutaneous at bedtime  insulin lispro (ADMELOG) corrective regimen sliding scale   SubCutaneous three times a day before meals  insulin lispro (ADMELOG) corrective regimen sliding scale   SubCutaneous at bedtime  lactobacillus acidophilus 1 Tablet(s) Oral daily  melatonin 3 milliGRAM(s) Oral at bedtime  mirtazapine 15 milliGRAM(s) Oral at bedtime  multivitamin 1 Tablet(s) Oral daily  pantoprazole    Tablet 40 milliGRAM(s) Oral before breakfast  polyethylene glycol 3350 17 Gram(s) Oral daily  senna 2 Tablet(s) Oral at bedtime  sertraline 50 milliGRAM(s) Oral daily  sodium chloride 0.45%. 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      
    Patient is a 72 year old woman admitted 6/11/22. She was noted at the SNF where she resides to have a new left facial droop after she complained of HA. She was not noted to hae the left facial droop on admission. She had residual left hemiplegia from a prior stroke.  Later in the evening had tachycardia in the 150s and noted not have pronounced left facial weakness. Telestroke was called and advised plavix in addition to ASA. Since then she denies HA or other new neurological complaints. She was found to have a left lower extremity DVT and has been started on coumadin with heparin as bridge. Today, Tuesday, she denies HA or other neurological complaints.    PMH: As above, S/P CVA with residual left hemiplegia 2013           HTN           HLD           Diabetes           CKD           Hypothyroid           Depression    SH: No allergy    Exam: Awake, alert, appropriate            Speech- without dysarthria            Pupils 2.5mm, EOM intact, no nystagmus, VFF           CN II-XII intact except Left VII-slight delay on left o smiling, slight flattening on left compared to right           Motor tone and strength            RUE 5/5        LUE 0/5, maintains flexed at elbow and wrist                                                           RLE  5/5        LLE 0/5                          11.2   12.26 )-----------( 320      ( 13 Jun 2022 06:44 )             35.3     06-13    144  |  112<H>  |  47<H>  ----------------------------<  93  4.8   |  22  |  3.46<H>    Ca    8.9      13 Jun 2022 06:44    TPro  7.6  /  Alb  3.0<L>  /  TBili  0.3  /  DBili  x   /  AST  17  /  ALT  9<L>  /  AlkPhos  75  06-12        < from: TTE Echo Complete w/o Contrast w/ Doppler (06.12.22 @ 08:14) >      Summary:   1. Left ventricular ejection fraction, by visual estimation, is 50 to   55%.   2.Normal global left ventricular systolic function.   3. Spectral Doppler shows impaired relaxation pattern of left   ventricular myocardial filling (Grade I diastolic dysfunction).   4. There is moderate concentric left ventricular hypertrophy.   5. Normal left atrial size.   6. Normal right atrial size.   7. Mild mitral valve regurgitation.   8. Thickening of the anterior and posterior mitral valve leaflets.   9. Mild tricuspid regurgitation.  10. Mild aortic regurgitation.            < from: US Duplex Carotid Arteries Complete, Bilateral (06.14.22 @ 10:06) >    Antegrade flow is noted within both vertebral arteries.    IMPRESSION: No significant hemodynamic stenosis of either carotid artery.    Measurement of carotid stenosis is based on velocity parameters that   correlate the residual internal carotid diameter with that of the more   distal vessel in accordance with a method suchas the [place] American   Symptomatic Carotid Endarterectomy Trial (NASCET).                  < from: CT Head No Cont (06.12.22 @ 20:36) >    COMPARISON STUDY: 06/11/2022 at 9:06 PM and 6:02 PM.    FINDINGS:  There is no CT evidence of acute intracranial hemorrhage, mass effect,   midline shift or acute territorial infarct.    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 Small chronic   appearing infarcts in the right corona radiata, right caudate nucleus and   right lentiform nucleus are stable. Ex vacuo dilatation of the right   lateral ventricle is stable.    There is mild generalized cerebral volume loss and mild to moderate   patchy periventricular white matter hypoattenuation compatible with   chronic microvascular ischemic disease.      There is minimal patchy paranasal sinus mucosal thickening. Trace   secretions within the posterior right ethmoid air cell may represent   trapped secretions versus sinusitis.    The mastoids are clear bilaterally.    The patient is status post cataract lens placement surgery bilaterally.    The calvarium and skull base appear within normal limits.    IMPRESSION:  No CT evidence of acute intracranial pathology.  No interval change from   06/11/2022 at 6:02 PM.                                                
No distress    Vital Signs Last 24 Hrs  T(C): 37.1 (06-17-22 @ 16:06), Max: 37.1 (06-17-22 @ 16:06)  T(F): 98.8 (06-17-22 @ 16:06), Max: 98.8 (06-17-22 @ 16:06)  HR: 99 (06-17-22 @ 16:06) (88 - 99)  BP: 112/60 (06-17-22 @ 16:06) (110/64 - 144/75)  RR: 16 (06-17-22 @ 16:06) (14 - 16)  SpO2: 99% (06-17-22 @ 16:06) (98% - 100%)    I&O's Detail    16 Jun 2022 07:01  -  17 Jun 2022 07:00  --------------------------------------------------------  OUT:    Intermittent Catheterization - Urethral (mL): 750 mL  Total OUT: 750 mL    17 Jun 2022 07:01  -  17 Jun 2022 23:01  --------------------------------------------------------  OUT:    Intermittent Catheterization - Urethral (mL): 900 mL  Total OUT: 900 mL                                      9.4    10.43 )-----------( 268      ( 17 Jun 2022 06:25 )             29.3     17 Jun 2022 06:25    141    |  108    |  39     ----------------------------<  104    3.9     |  24     |  2.20     Ca    7.9        17 Jun 2022 06:25    acetaminophen     Tablet .. 650 milliGRAM(s) Oral every 8 hours PRN  apixaban 5 milliGRAM(s) Oral two times a day  artificial  tears Solution 1 Drop(s) Both EYES two times a day  atorvastatin 80 milliGRAM(s) Oral at bedtime  cefuroxime   Tablet 250 milliGRAM(s) Oral every 12 hours  dextrose 5%. 1000 milliLiter(s) IV Continuous <Continuous>  dextrose 5%. 1000 milliLiter(s) IV Continuous <Continuous>  dextrose 50% Injectable 25 Gram(s) IV Push once  dextrose 50% Injectable 12.5 Gram(s) IV Push once  dextrose 50% Injectable 25 Gram(s) IV Push once  dextrose Oral Gel 15 Gram(s) Oral once PRN  glucagon  Injectable 1 milliGRAM(s) IntraMuscular once  hydrocortisone 2.5% Rectal Cream 1 Application(s) Rectal at bedtime  insulin glargine Injectable (LANTUS) 6 Unit(s) SubCutaneous at bedtime  insulin lispro (ADMELOG) corrective regimen sliding scale   SubCutaneous three times a day before meals  insulin lispro (ADMELOG) corrective regimen sliding scale   SubCutaneous at bedtime  lactobacillus acidophilus 1 Tablet(s) Oral daily  melatonin 3 milliGRAM(s) Oral at bedtime  mirtazapine 15 milliGRAM(s) Oral at bedtime  multivitamin 1 Tablet(s) Oral daily  pantoprazole    Tablet 40 milliGRAM(s) Oral before breakfast  polyethylene glycol 3350 17 Gram(s) Oral daily  senna 2 Tablet(s) Oral at bedtime  sertraline 50 milliGRAM(s) Oral daily  sodium chloride 0.45%. 1000 milliLiter(s) IV Continuous <Continuous>  traMADol 25 milliGRAM(s) Oral every 6 hours PRN    Impression/Plan:    Adm w/obstructive MAXIMILIAN (Cr 1.01 - 2/18/22)  UA bland  MIGUEL c/w obstructive MAXIMILIAN  Would d/c w/rodriguez   Cr is improving  Avoid nephrotoxins  F/u BMP, UO    [phone]      
No distress, rodriguez w/gross hematuria    Vital Signs Last 24 Hrs  T(C): 36.7 (06-14-22 @ 12:15), Max: 36.9 (06-13-22 @ 20:00)  T(F): 98.1 (06-14-22 @ 12:15), Max: 98.5 (06-13-22 @ 20:00)  HR: 84 (06-14-22 @ 12:15) (82 - 92)  BP: 125/62 (06-14-22 @ 12:15) (123/60 - 150/77)  RR: 18 (06-14-22 @ 12:15) (14 - 18)  SpO2: 98% (06-14-22 @ 12:15) (98% - 100%)    I&O's Detail    13 Jun 2022 07:01  -  14 Jun 2022 07:00  --------------------------------------------------------  OUT:    Indwelling Catheter - Urethral (mL): 570 mL  Total OUT: 570 mL    s1s2  b/l air entry  soft, ND  no edema                        9.7    11.69 )-----------( 288      ( 14 Jun 2022 13:35 )             30.6     14 Jun 2022 06:20    137    |  107    |  41     ----------------------------<  85     4.4     |  19     |  2.97     Ca    8.2        14 Jun 2022 06:20    PT/INR - ( 14 Jun 2022 06:20 )   PT: 12.7 sec;   INR: 1.09 ratio      PTT - ( 14 Jun 2022 08:30 )  PTT:87.1 sec  CAPILLARY BLOOD GLUCOSE    acetaminophen     Tablet .. 650 milliGRAM(s) Oral every 8 hours PRN  amLODIPine   Tablet 5 milliGRAM(s) Oral daily  artificial  tears Solution 1 Drop(s) Both EYES two times a day  atorvastatin 80 milliGRAM(s) Oral at bedtime  dextrose 5%. 1000 milliLiter(s) IV Continuous <Continuous>  dextrose 5%. 1000 milliLiter(s) IV Continuous <Continuous>  dextrose 50% Injectable 25 Gram(s) IV Push once  dextrose 50% Injectable 25 Gram(s) IV Push once  dextrose 50% Injectable 12.5 Gram(s) IV Push once  dextrose Oral Gel 15 Gram(s) Oral once PRN  glucagon  Injectable 1 milliGRAM(s) IntraMuscular once  heparin   Injectable 5000 Unit(s) IV Push every 6 hours PRN  heparin   Injectable 2500 Unit(s) IV Push every 6 hours PRN  heparin  Infusion. 800 Unit(s)/Hr IV Continuous <Continuous>  hydrocortisone 2.5% Rectal Cream 1 Application(s) Rectal at bedtime  insulin glargine Injectable (LANTUS) 6 Unit(s) SubCutaneous at bedtime  insulin lispro (ADMELOG) corrective regimen sliding scale   SubCutaneous three times a day before meals  insulin lispro (ADMELOG) corrective regimen sliding scale   SubCutaneous at bedtime  lactobacillus acidophilus 1 Tablet(s) Oral daily  melatonin 3 milliGRAM(s) Oral at bedtime  mirtazapine 15 milliGRAM(s) Oral at bedtime  multivitamin 1 Tablet(s) Oral daily  pantoprazole    Tablet 40 milliGRAM(s) Oral before breakfast  polyethylene glycol 3350 17 Gram(s) Oral daily  senna 2 Tablet(s) Oral at bedtime  sertraline 50 milliGRAM(s) Oral daily  sodium chloride 0.45%. 1000 milliLiter(s) IV Continuous <Continuous>  traMADol 25 milliGRAM(s) Oral every 6 hours PRN  warfarin 5 milliGRAM(s) Oral once    Impression/Plan:    Suspect obstructive MAXIMILIAN (Cr 1.01 - 2/18/22, although Cr 3.28 - 4/27/22 noted as well)  UA bland  SONO c/w obstructive MAXIMILIAN  Continue rodriguez  Gentle IVF  Cr is improving  Would get  input  Avoid nephrotoxins  F/u BMP, UO    [phone]      
Resting    Vital Signs Last 24 Hrs  T(C): 36.9 (06-16-22 @ 20:39), Max: 36.9 (06-16-22 @ 08:23)  T(F): 98.5 (06-16-22 @ 20:39), Max: 98.5 (06-16-22 @ 20:39)  HR: 85 (06-16-22 @ 20:39) (85 - 100)  BP: 131/61 (06-16-22 @ 20:39) (105/45 - 131/61)  RR: 16 (06-16-22 @ 20:39) (15 - 20)  SpO2: 98% (06-16-22 @ 20:39) (98% - 100%)    I&O's Detail    15 Adalberto 2022 07:01  -  16 Jun 2022 07:00  --------------------------------------------------------  IN:    Heparin Infusion: 96 mL    sodium chloride 0.45%: 720 mL  Total IN: 816 mL    OUT:    Intermittent Catheterization - Urethral (mL): 747 mL  Total OUT: 747 mL    Total NET: 69 mL    16 Jun 2022 07:01  -  16 Jun 2022 21:21  --------------------------------------------------------  OUT:    Intermittent Catheterization - Urethral (mL): 750 mL  Total OUT: 750 mL    s1s2  b/l air entry  soft, ND  no edema                                    9.5    14.57 )-----------( 261      ( 16 Jun 2022 07:50 )             30.4     16 Jun 2022 07:50    140    |  107    |  39     ----------------------------<  122    4.0     |  23     |  2.32     Ca    7.7        16 Jun 2022 07:50    acetaminophen     Tablet .. 650 milliGRAM(s) Oral every 8 hours PRN  amLODIPine   Tablet 5 milliGRAM(s) Oral daily  apixaban 5 milliGRAM(s) Oral two times a day  artificial  tears Solution 1 Drop(s) Both EYES two times a day  atorvastatin 80 milliGRAM(s) Oral at bedtime  cefuroxime   Tablet 250 milliGRAM(s) Oral every 12 hours  dextrose 5%. 1000 milliLiter(s) IV Continuous <Continuous>  dextrose 5%. 1000 milliLiter(s) IV Continuous <Continuous>  dextrose 50% Injectable 25 Gram(s) IV Push once  dextrose 50% Injectable 12.5 Gram(s) IV Push once  dextrose 50% Injectable 25 Gram(s) IV Push once  dextrose Oral Gel 15 Gram(s) Oral once PRN  glucagon  Injectable 1 milliGRAM(s) IntraMuscular once  hydrocortisone 2.5% Rectal Cream 1 Application(s) Rectal at bedtime  insulin glargine Injectable (LANTUS) 6 Unit(s) SubCutaneous at bedtime  insulin lispro (ADMELOG) corrective regimen sliding scale   SubCutaneous three times a day before meals  insulin lispro (ADMELOG) corrective regimen sliding scale   SubCutaneous at bedtime  lactobacillus acidophilus 1 Tablet(s) Oral daily  melatonin 3 milliGRAM(s) Oral at bedtime  mirtazapine 15 milliGRAM(s) Oral at bedtime  multivitamin 1 Tablet(s) Oral daily  pantoprazole    Tablet 40 milliGRAM(s) Oral before breakfast  polyethylene glycol 3350 17 Gram(s) Oral daily  senna 2 Tablet(s) Oral at bedtime  sertraline 50 milliGRAM(s) Oral daily  sodium chloride 0.45%. 1000 milliLiter(s) IV Continuous <Continuous>  traMADol 25 milliGRAM(s) Oral every 6 hours PRN    Impression/Plan:    Adm w/obstructive MAXIMILIAN (Cr 1.01 - 2/18/22)  UA bland  SONO c/w obstructive MAXIMILIAN  Continue rodriguez  Gentle IVF  Cr is improving  Avoid nephrotoxins  F/u BMP, UO    [phone]      
Patient is a 72y old  Female who presents with a chief complaint of TIA (2022 20:58)      Patient seen and examined at bedside. No overnight events reported.  Pt. reports no complaints.    ALLERGIES:  No Known Allergies    MEDICATIONS  (STANDING):  artificial  tears Solution 1 Drop(s) Both EYES two times a day  aspirin enteric coated 81 milliGRAM(s) Oral daily  atorvastatin 80 milliGRAM(s) Oral at bedtime  clopidogrel Tablet 75 milliGRAM(s) Oral daily  dextrose 5%. 1000 milliLiter(s) (100 mL/Hr) IV Continuous <Continuous>  dextrose 5%. 1000 milliLiter(s) (50 mL/Hr) IV Continuous <Continuous>  dextrose 50% Injectable 25 Gram(s) IV Push once  dextrose 50% Injectable 12.5 Gram(s) IV Push once  dextrose 50% Injectable 25 Gram(s) IV Push once  glucagon  Injectable 1 milliGRAM(s) IntraMuscular once  heparin   Injectable 5000 Unit(s) IV Push once  heparin  Infusion.  Unit(s)/Hr (11 mL/Hr) IV Continuous <Continuous>  hydrocortisone 2.5% Rectal Cream 1 Application(s) Rectal at bedtime  insulin glargine Injectable (LANTUS) 6 Unit(s) SubCutaneous at bedtime  insulin lispro (ADMELOG) corrective regimen sliding scale   SubCutaneous three times a day before meals  insulin lispro (ADMELOG) corrective regimen sliding scale   SubCutaneous at bedtime  lactobacillus acidophilus 1 Tablet(s) Oral daily  melatonin 3 milliGRAM(s) Oral at bedtime  mirtazapine 15 milliGRAM(s) Oral at bedtime  multivitamin 1 Tablet(s) Oral daily  pantoprazole    Tablet 40 milliGRAM(s) Oral before breakfast  senna 2 Tablet(s) Oral at bedtime  sertraline 50 milliGRAM(s) Oral daily    MEDICATIONS  (PRN):  acetaminophen     Tablet .. 650 milliGRAM(s) Oral every 8 hours PRN Temp greater or equal to 38C (100.4F), Mild Pain (1 - 3)  dextrose Oral Gel 15 Gram(s) Oral once PRN Blood Glucose LESS THAN 70 milliGRAM(s)/deciliter  heparin   Injectable 5000 Unit(s) IV Push every 6 hours PRN For aPTT less than 40  heparin   Injectable 2500 Unit(s) IV Push every 6 hours PRN For aPTT between 40 - 57    Vital Signs Last 24 Hrs  T(F): 98.1 (2022 13:47), Max: 99.3 (2022 22:52)  HR: 97 (2022 13:47) (77 - 117)  BP: 153/85 (2022 13:47) (128/74 - 179/73)  RR: 18 (2022 13:47) (16 - 19)  SpO2: 100% (2022 13:47) (99% - 100%)  I&O's Summary    2022 07:01  -  2022 07:00  --------------------------------------------------------  IN: 0 mL / OUT: 1500 mL / NET: -1500 mL      PHYSICAL EXAM:  General: NAD, A/O x 3  ENT: No gross hearing impairment, Moist mucous membranes, no thrush  Neck: Supple, No JVD  Lungs: Clear to auscultation bilaterally, good air entry, non-labored breathing  Cardio: RRR, S1/S2, No murmur  Abdomen: Soft, Nontender, Nondistended; Bowel sounds present  Extremities: left lower ext edema, no pitting edema  Neuro:  alert, left sided weakness with left wrist flexion/contracture    LABS:                        10.9   14.43 )-----------( 325      ( 2022 06:25 )             34.5     06-12    140  |  110  |  50  ----------------------------<  87  5.0   |  17  |  3.70    Ca    8.6      2022 06:25    TPro  7.6  /  Alb  3.0  /  TBili  0.3  /  DBili  x   /  AST  17  /  ALT  9   /  AlkPhos  75  06-12          PT/INR - ( 2022 18:10 )   PT: 12.0 sec;   INR: 1.03 ratio         PTT - ( 2022 18:10 )  PTT:30.8 sec    Procalcitonin, Serum: 0.09 ng/mL (22 @ 18:10)    CARDIAC MARKERS ( 2022 20:00 )  x     / 13.2 ng/L / x     / x     / x      CARDIAC MARKERS ( 2022 18:10 )  x     / 11.0 ng/L / x     / x     / x          06-12 Chol 145 mg/dL LDL -- HDL 43 mg/dL Trig 83 mg/dL  TSH 3.496   TSH with FT4 reflex --  Total T3 --              POCT Blood Glucose.: 86 mg/dL (2022 11:11)  POCT Blood Glucose.: 78 mg/dL (2022 07:23)  POCT Blood Glucose.: 128 mg/dL (2022 20:43)      Urinalysis Basic - ( 2022 05:55 )    Color: Yellow / Appearance: Clear / S.010 / pH: x  Gluc: x / Ketone: Negative  / Bili: Negative / Urobili: Negative   Blood: x / Protein: 15 / Nitrite: Negative   Leuk Esterase: Moderate / RBC: 0-4 /HPF / WBC 6-10 /HPF   Sq Epi: x / Non Sq Epi: Neg.-Few / Bacteria: Few /HPF        COVID-19 PCR: NotDetec (22 @ 18:10)    RADIOLOGY & ADDITIONAL TESTS:  < from: US Duplex Venous Lower Ext Ltd, Left (22 @ 13:24) >    IMPRESSION:  Left common femoral vein acute deep vein thrombosis.  Acute deep venous thrombosis: above the knee.    < end of copied text >  < from: Xray Chest 1 View- PORTABLE-Urgent (Xray Chest 1 View- PORTABLE-Urgent .) (22 @ 21:08) >    IMPRESSION:    Unremarkable frontal chest x ray    < end of copied text >  < from: CT Head No Cont (22 @ 21:06) >    FINDINGS:  There is no CT evidence of acute intracranial hemorrhage, mass effect,   midline shift or acute territorial infarct.    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  Small chronic   appearing infarcts in the right corona radiata, right caudate nucleus and   right lentiform nucleus are stable.  Ex vacuo dilatation of the right   lateral ventricle is stable.    There is mild generalized cerebral volume loss and mild to moderate   patchy periventricular white matter hypoattenuation compatible with   chronic microvascular ischemic disease.      There is minimal patchy paranasal sinus mucosal thickening.  Trace   secretions within the posterior right ethmoid air cell may represent   trapped secretions versus sinusitis.    The mastoids are clear bilaterally.    The patient is status post cataract lens placement surgery bilaterally.    The calvarium and skull base appear within normal limits.    IMPRESSION:  No CT evidence of acute intracranial pathology.  I discussed the findings   with Dr. Mccabe on 2022 at 9:45 PM.  Read back verification was   obtained.    < end of copied text >  < from: TTE Echo Complete w/o Contrast w/ Doppler (22 @ 08:14) >      Summary:   1. Left ventricular ejection fraction, by visual estimation, is 50 to   55%.   2.Normal global left ventricular systolic function.   3. Spectral Doppler shows impaired relaxation pattern of left   ventricular myocardial filling (Grade I diastolic dysfunction).   4. There is moderate concentric left ventricular hypertrophy.   5. Normal left atrial size.   6. Normal right atrial size.   7. Mild mitral valve regurgitation.   8. Thickening of the anterior and posterior mitral valve leaflets.   9. Mild tricuspid regurgitation.  10. Mild aortic regurgitation.    < end of copied text >    Care Discussed with Consultants/Other Providers:   
Patient is a 72y old  Female who presents with a chief complaint of Transient cerebral ischemia     (2022 10:47)    No acute issues overnight   Has not had BM   Patient seen and examined at bedside.    ALLERGIES:  No Known Allergies    MEDICATIONS  (STANDING):  amLODIPine   Tablet 5 milliGRAM(s) Oral daily  artificial  tears Solution 1 Drop(s) Both EYES two times a day  aspirin enteric coated 81 milliGRAM(s) Oral daily  atorvastatin 80 milliGRAM(s) Oral at bedtime  clopidogrel Tablet 75 milliGRAM(s) Oral daily  dextrose 5%. 1000 milliLiter(s) (100 mL/Hr) IV Continuous <Continuous>  dextrose 5%. 1000 milliLiter(s) (50 mL/Hr) IV Continuous <Continuous>  dextrose 50% Injectable 25 Gram(s) IV Push once  dextrose 50% Injectable 12.5 Gram(s) IV Push once  dextrose 50% Injectable 25 Gram(s) IV Push once  glucagon  Injectable 1 milliGRAM(s) IntraMuscular once  heparin  Infusion.  Unit(s)/Hr (11 mL/Hr) IV Continuous <Continuous>  hydrocortisone 2.5% Rectal Cream 1 Application(s) Rectal at bedtime  insulin glargine Injectable (LANTUS) 6 Unit(s) SubCutaneous at bedtime  insulin lispro (ADMELOG) corrective regimen sliding scale   SubCutaneous three times a day before meals  insulin lispro (ADMELOG) corrective regimen sliding scale   SubCutaneous at bedtime  lactobacillus acidophilus 1 Tablet(s) Oral daily  melatonin 3 milliGRAM(s) Oral at bedtime  mirtazapine 15 milliGRAM(s) Oral at bedtime  multivitamin 1 Tablet(s) Oral daily  pantoprazole    Tablet 40 milliGRAM(s) Oral before breakfast  polyethylene glycol 3350 17 Gram(s) Oral daily  senna 2 Tablet(s) Oral at bedtime  sertraline 50 milliGRAM(s) Oral daily  warfarin 2 milliGRAM(s) Oral once    MEDICATIONS  (PRN):  acetaminophen     Tablet .. 650 milliGRAM(s) Oral every 8 hours PRN Temp greater or equal to 38C (100.4F), Mild Pain (1 - 3)  dextrose Oral Gel 15 Gram(s) Oral once PRN Blood Glucose LESS THAN 70 milliGRAM(s)/deciliter  heparin   Injectable 5000 Unit(s) IV Push every 6 hours PRN For aPTT less than 40  heparin   Injectable 2500 Unit(s) IV Push every 6 hours PRN For aPTT between 40 - 57    Vital Signs Last 24 Hrs  T(F): 98.2 (2022 08:01), Max: 98.2 (2022 08:01)  HR: 99 (2022 08:01) (91 - 99)  BP: 134/63 (2022 08:01) (134/63 - 158/79)  RR: 15 (2022 08:01) (13 - 18)  SpO2: 100% (2022 08:01) (100% - 100%)  I&O's Summary    2022 07:01  -  2022 07:00  --------------------------------------------------------  IN: 200 mL / OUT: 200 mL / NET: 0 mL        PHYSICAL EXAM:  General: NAD, A/O x 3, elderly   ENT: MMM, no thrush  Neck: Supple, No JVD  Lungs: Non labored breathing,  Clear to auscultation bilaterally,   Cardio: RRR, S1/S2,  no pitting edema bilaterally  Abdomen: Soft, Nontender, Nondistended; Bowel sounds present  Extremities: No calf tenderness, L sided weakness    LABS:                        11.2   12.26 )-----------( 320      ( 2022 06:44 )             35.3       06-13    144  |  112  |  47  ----------------------------<  93  4.8   |  22  |  3.46    Ca    8.9      2022 06:44    TPro  7.6  /  Alb  3.0  /  TBili  0.3  /  DBili  x   /  AST  17  /  ALT  9   /  AlkPhos  75  06-12       PT/INR - ( 2022 06:44 )   PT: 13.4 sec;   INR: 1.15 ratio         PTT - ( 2022 06:44 )  PTT:116.1 sec     CARDIAC MARKERS ( 2022 20:00 )  x     / 13.2 ng/L / x     / x     / x      CARDIAC MARKERS ( 2022 18:10 )  x     / 11.0 ng/L / x     / x     / x          06-12 Chol 145 mg/dL LDL -- HDL 43 mg/dL Trig 83 mg/dL  TSH 3.496   TSH with FT4 reflex --  Total T3 --              POCT Blood Glucose.: 100 mg/dL (2022 08:06)  POCT Blood Glucose.: 105 mg/dL (2022 21:23)  POCT Blood Glucose.: 86 mg/dL (2022 17:15)      Urinalysis Basic - ( 2022 05:55 )    Color: Yellow / Appearance: Clear / S.010 / pH: x  Gluc: x / Ketone: Negative  / Bili: Negative / Urobili: Negative   Blood: x / Protein: 15 / Nitrite: Negative   Leuk Esterase: Moderate / RBC: 0-4 /HPF / WBC 6-10 /HPF   Sq Epi: x / Non Sq Epi: Neg.-Few / Bacteria: Few /HPF        COVID-19 PCR: NotDetec (22 @ 18:10)      RADIOLOGY & ADDITIONAL TESTS:    Care Discussed with Consultants/Other Providers:   
Patient is a 72y old  Female who presents with a chief complaint of TIA (15 Adalberto 2022 07:54)  Noted to have hematuria overnight   Seen by urology     Patient seen and examined at bedside.    ALLERGIES:  No Known Allergies    MEDICATIONS  (STANDING):  amLODIPine   Tablet 5 milliGRAM(s) Oral daily  artificial  tears Solution 1 Drop(s) Both EYES two times a day  atorvastatin 80 milliGRAM(s) Oral at bedtime  cefuroxime   Tablet 250 milliGRAM(s) Oral every 12 hours  dextrose 5%. 1000 milliLiter(s) (100 mL/Hr) IV Continuous <Continuous>  dextrose 5%. 1000 milliLiter(s) (50 mL/Hr) IV Continuous <Continuous>  dextrose 50% Injectable 25 Gram(s) IV Push once  dextrose 50% Injectable 12.5 Gram(s) IV Push once  dextrose 50% Injectable 25 Gram(s) IV Push once  glucagon  Injectable 1 milliGRAM(s) IntraMuscular once  heparin  Infusion. 800 Unit(s)/Hr (8 mL/Hr) IV Continuous <Continuous>  hydrocortisone 2.5% Rectal Cream 1 Application(s) Rectal at bedtime  insulin glargine Injectable (LANTUS) 6 Unit(s) SubCutaneous at bedtime  insulin lispro (ADMELOG) corrective regimen sliding scale   SubCutaneous three times a day before meals  insulin lispro (ADMELOG) corrective regimen sliding scale   SubCutaneous at bedtime  lactobacillus acidophilus 1 Tablet(s) Oral daily  melatonin 3 milliGRAM(s) Oral at bedtime  mirtazapine 15 milliGRAM(s) Oral at bedtime  multivitamin 1 Tablet(s) Oral daily  pantoprazole    Tablet 40 milliGRAM(s) Oral before breakfast  polyethylene glycol 3350 17 Gram(s) Oral daily  senna 2 Tablet(s) Oral at bedtime  sertraline 50 milliGRAM(s) Oral daily  sodium chloride 0.45%. 1000 milliLiter(s) (60 mL/Hr) IV Continuous <Continuous>    MEDICATIONS  (PRN):  acetaminophen     Tablet .. 650 milliGRAM(s) Oral every 8 hours PRN Temp greater or equal to 38C (100.4F), Mild Pain (1 - 3)  dextrose Oral Gel 15 Gram(s) Oral once PRN Blood Glucose LESS THAN 70 milliGRAM(s)/deciliter  heparin   Injectable 2500 Unit(s) IV Push every 6 hours PRN For aPTT between 40 - 57  heparin   Injectable 5000 Unit(s) IV Push every 6 hours PRN For aPTT less than 40  traMADol 25 milliGRAM(s) Oral every 6 hours PRN Moderate Pain (4 - 6)    Vital Signs Last 24 Hrs  T(F): 97.4 (15 Adalberto 2022 09:02), Max: 98.1 (14 Jun 2022 12:15)  HR: 89 (15 Adalberto 2022 09:02) (83 - 91)  BP: 152/70 (15 Adalberto 2022 09:02) (123/84 - 152/70)  RR: 18 (15 Adalberto 2022 09:02) (16 - 18)  SpO2: 100% (15 Adalberto 2022 09:02) (98% - 100%)  I&O's Summary    14 Jun 2022 07:01  -  15 Adalberto 2022 07:00  --------------------------------------------------------  IN: 0 mL / OUT: 2225 mL / NET: -2225 mL        PHYSICAL EXAM:  General: NAD, A/O x 3, elderly   ENT: MMM, no thrush  Neck: Supple, No JVD  Lungs: Non labored breathing,  Clear to auscultation bilaterally,   Cardio: RRR, S1/S2, no pitting edema bilaterally  Abdomen: Soft, Nontender, Nondistended; Bowel sounds present  Extremities: No calf tenderness, moves all extremities  Neuro: left sided weakness    LABS:                        10.6   11.43 )-----------( 288      ( 15 Adalberto 2022 06:50 )             33.3       06-15    141  |  107  |  38  ----------------------------<  87  4.2   |  24  |  2.52    Ca    7.8      15 Adalberto 2022 06:50         PT/INR - ( 15 Adalberto 2022 06:53 )   PT: 12.8 sec;   INR: 1.10 ratio         PTT - ( 15 Adalberto 2022 00:20 )  PTT:74.4 sec         06-12 Chol 145 mg/dL LDL -- HDL 43 mg/dL Trig 83 mg/dL              POCT Blood Glucose.: 80 mg/dL (15 Adalberto 2022 08:40)  POCT Blood Glucose.: 154 mg/dL (14 Jun 2022 22:15)  POCT Blood Glucose.: 121 mg/dL (14 Jun 2022 17:16)  POCT Blood Glucose.: 120 mg/dL (14 Jun 2022 12:12)          COVID-19 PCR: NotDetec (06-11-22 @ 18:10)      RADIOLOGY & ADDITIONAL TESTS:    Care Discussed with Consultants/Other Providers:   
Patient is a 72y old  Female who presents with a chief complaint of TIA (15 Adalberto 2022 23:21)    SOme hematuria noted w straight cath last night  Pt frustrated that she is getting q 6 hrs blood draws for PTT  Patient seen and examined at bedside.    ALLERGIES:  No Known Allergies    MEDICATIONS  (STANDING):  amLODIPine   Tablet 5 milliGRAM(s) Oral daily  apixaban 5 milliGRAM(s) Oral two times a day  artificial  tears Solution 1 Drop(s) Both EYES two times a day  atorvastatin 80 milliGRAM(s) Oral at bedtime  cefuroxime   Tablet 250 milliGRAM(s) Oral every 12 hours  dextrose 5%. 1000 milliLiter(s) (100 mL/Hr) IV Continuous <Continuous>  dextrose 5%. 1000 milliLiter(s) (50 mL/Hr) IV Continuous <Continuous>  dextrose 50% Injectable 25 Gram(s) IV Push once  dextrose 50% Injectable 12.5 Gram(s) IV Push once  dextrose 50% Injectable 25 Gram(s) IV Push once  glucagon  Injectable 1 milliGRAM(s) IntraMuscular once  hydrocortisone 2.5% Rectal Cream 1 Application(s) Rectal at bedtime  insulin glargine Injectable (LANTUS) 6 Unit(s) SubCutaneous at bedtime  insulin lispro (ADMELOG) corrective regimen sliding scale   SubCutaneous three times a day before meals  insulin lispro (ADMELOG) corrective regimen sliding scale   SubCutaneous at bedtime  lactobacillus acidophilus 1 Tablet(s) Oral daily  melatonin 3 milliGRAM(s) Oral at bedtime  mirtazapine 15 milliGRAM(s) Oral at bedtime  multivitamin 1 Tablet(s) Oral daily  pantoprazole    Tablet 40 milliGRAM(s) Oral before breakfast  polyethylene glycol 3350 17 Gram(s) Oral daily  senna 2 Tablet(s) Oral at bedtime  sertraline 50 milliGRAM(s) Oral daily  sodium chloride 0.45%. 1000 milliLiter(s) (60 mL/Hr) IV Continuous <Continuous>    MEDICATIONS  (PRN):  acetaminophen     Tablet .. 650 milliGRAM(s) Oral every 8 hours PRN Temp greater or equal to 38C (100.4F), Mild Pain (1 - 3)  dextrose Oral Gel 15 Gram(s) Oral once PRN Blood Glucose LESS THAN 70 milliGRAM(s)/deciliter  traMADol 25 milliGRAM(s) Oral every 6 hours PRN Moderate Pain (4 - 6)    Vital Signs Last 24 Hrs  T(F): 98.4 (16 Jun 2022 08:23), Max: 98.4 (16 Jun 2022 08:23)  HR: 96 (16 Jun 2022 08:23) (83 - 100)  BP: 105/45 (16 Jun 2022 08:23) (105/45 - 128/62)  RR: 20 (16 Jun 2022 08:23) (12 - 20)  SpO2: 100% (16 Jun 2022 08:23) (99% - 100%)  I&O's Summary    15 Adalberto 2022 07:01  -  16 Jun 2022 07:00  --------------------------------------------------------  IN: 816 mL / OUT: 747 mL / NET: 69 mL        PHYSICAL EXAM:  General: NAD, A/O x 3, elderly   ENT: MMM, no thrush  Neck: Supple, No JVD  Lungs: Non labored breathing,  Clear to auscultation bilaterally,   Cardio: RRR, S1/S2, no pitting edema bilaterally  Abdomen: Soft, Nontender, Nondistended; Bowel sounds present  Extremities: No calf tenderness, L sided weakness     LABS:                        9.5    14.57 )-----------( 261      ( 16 Jun 2022 07:50 )             30.4       06-16    140  |  107  |  39  ----------------------------<  122  4.0   |  23  |  2.32    Ca    7.7      16 Jun 2022 07:50         PT/INR - ( 16 Jun 2022 07:50 )   PT: 16.5 sec;   INR: 1.42 ratio         PTT - ( 16 Jun 2022 07:50 )  PTT:89.5 sec         06-12 Chol 145 mg/dL LDL -- HDL 43 mg/dL Trig 83 mg/dL              POCT Blood Glucose.: 112 mg/dL (16 Jun 2022 08:39)  POCT Blood Glucose.: 146 mg/dL (15 Adalberto 2022 21:04)  POCT Blood Glucose.: 150 mg/dL (15 Adalberto 2022 17:11)  POCT Blood Glucose.: 120 mg/dL (15 Adalberto 2022 12:40)          COVID-19 PCR: NotDetec (06-11-22 @ 18:10)      RADIOLOGY & ADDITIONAL TESTS:    Care Discussed with Consultants/Other Providers:   
Patient is a 72y old  Female who presents with a chief complaint of TIA (2022 22:14)      Patient seen and examined at bedside.  Per nurse report pt with hematuria in rodriguez bag.  Pt with no complaints.    ALLERGIES:  No Known Allergies    MEDICATIONS  (STANDING):  amLODIPine   Tablet 5 milliGRAM(s) Oral daily  artificial  tears Solution 1 Drop(s) Both EYES two times a day  atorvastatin 80 milliGRAM(s) Oral at bedtime  dextrose 5%. 1000 milliLiter(s) (50 mL/Hr) IV Continuous <Continuous>  dextrose 5%. 1000 milliLiter(s) (100 mL/Hr) IV Continuous <Continuous>  dextrose 50% Injectable 25 Gram(s) IV Push once  dextrose 50% Injectable 25 Gram(s) IV Push once  dextrose 50% Injectable 12.5 Gram(s) IV Push once  glucagon  Injectable 1 milliGRAM(s) IntraMuscular once  heparin  Infusion. 800 Unit(s)/Hr (8 mL/Hr) IV Continuous <Continuous>  hydrocortisone 2.5% Rectal Cream 1 Application(s) Rectal at bedtime  insulin glargine Injectable (LANTUS) 6 Unit(s) SubCutaneous at bedtime  insulin lispro (ADMELOG) corrective regimen sliding scale   SubCutaneous three times a day before meals  insulin lispro (ADMELOG) corrective regimen sliding scale   SubCutaneous at bedtime  lactobacillus acidophilus 1 Tablet(s) Oral daily  melatonin 3 milliGRAM(s) Oral at bedtime  mirtazapine 15 milliGRAM(s) Oral at bedtime  multivitamin 1 Tablet(s) Oral daily  pantoprazole    Tablet 40 milliGRAM(s) Oral before breakfast  polyethylene glycol 3350 17 Gram(s) Oral daily  senna 2 Tablet(s) Oral at bedtime  sertraline 50 milliGRAM(s) Oral daily  sodium chloride 0.45%. 1000 milliLiter(s) (60 mL/Hr) IV Continuous <Continuous>    MEDICATIONS  (PRN):  acetaminophen     Tablet .. 650 milliGRAM(s) Oral every 8 hours PRN Temp greater or equal to 38C (100.4F), Mild Pain (1 - 3)  dextrose Oral Gel 15 Gram(s) Oral once PRN Blood Glucose LESS THAN 70 milliGRAM(s)/deciliter  heparin   Injectable 5000 Unit(s) IV Push every 6 hours PRN For aPTT less than 40  heparin   Injectable 2500 Unit(s) IV Push every 6 hours PRN For aPTT between 40 - 57    Vital Signs Last 24 Hrs  T(F): 97.5 (2022 08:45), Max: 98.5 (2022 20:00)  HR: 89 (2022 08:45) (82 - 92)  BP: 150/77 (2022 08:45) (123/60 - 158/64)  RR: 18 (2022 08:45) (14 - 18)  SpO2: 98% (2022 08:45) (98% - 100%)  I&O's Summary    2022 07:01  -  2022 07:00  --------------------------------------------------------  IN: 108 mL / OUT: 570 mL / NET: -462 mL      PHYSICAL EXAM:  General: NAD, A/O x 3  ENT: No gross hearing impairment, Moist mucous membranes, no thrush  Neck: Supple, No JVD  Lungs: Clear to auscultation bilaterally, good air entry, non-labored breathing  Cardio: RRR, S1/S2, No murmur  Abdomen: Soft, Nontender, Nondistended; Bowel sounds present  G/U:  +RODRIGUEZ BAG with hematuria  Extremities: left thigh slightly edematous, No calf tenderness, No cyanosis, No pitting edema  Neuro:  speech hypophonic, left sided weakness    LABS:                        10.8   13.07 )-----------( 294      ( 2022 06:20 )             33.8     06-14    137  |  107  |  41  ----------------------------<  85  4.4   |  19  |  2.97    Ca    8.2      2022 06:20    TPro  7.6  /  Alb  3.0  /  TBili  0.3  /  DBili  x   /  AST  17  /  ALT  9   /  AlkPhos  75  06-12          PT/INR - ( 2022 06:20 )   PT: 12.7 sec;   INR: 1.09 ratio         PTT - ( 2022 08:30 )  PTT:87.1 sec    Procalcitonin, Serum: 0.09 ng/mL (22 @ 18:10)    CARDIAC MARKERS ( 2022 20:00 )  x     / 13.2 ng/L / x     / x     / x      CARDIAC MARKERS ( 2022 18:10 )  x     / 11.0 ng/L / x     / x     / x          06-12 Chol 145 mg/dL LDL -- HDL 43 mg/dL Trig 83 mg/dL  TSH 3.496   TSH with FT4 reflex --  Total T3 --              POCT Blood Glucose.: 120 mg/dL (2022 12:12)  POCT Blood Glucose.: 76 mg/dL (2022 07:59)  POCT Blood Glucose.: 136 mg/dL (2022 21:47)  POCT Blood Glucose.: 89 mg/dL (2022 17:11)      Urinalysis Basic - ( 2022 05:55 )    Color: Yellow / Appearance: Clear / S.010 / pH: x  Gluc: x / Ketone: Negative  / Bili: Negative / Urobili: Negative   Blood: x / Protein: 15 / Nitrite: Negative   Leuk Esterase: Moderate / RBC: 0-4 /HPF / WBC 6-10 /HPF   Sq Epi: x / Non Sq Epi: Neg.-Few / Bacteria: Few /HPF        COVID-19 PCR: NotDetec (22 @ 18:10)    RADIOLOGY & ADDITIONAL TESTS:  < from: US Duplex Carotid Arteries Complete, Bilateral (22 @ 10:06) >    IMPRESSION: No significant hemodynamic stenosis of either carotid artery.    Measurement of carotid stenosis is based on velocity parameters that   correlate the residual internal carotid diameter with that of the more   distal vessel in accordance with a method suchas the [place] American   Symptomatic Carotid Endarterectomy Trial (NASCET).    < end of copied text >  < from: US Renal (22 @ 12:55) >    IMPRESSION:  Prominent renal pelvis and fullness of the collecting systems   bilaterally, right more than left.    < end of copied text >  < from: CT Head No Cont (22 @ 20:36) >    FINDINGS:  There is no CT evidence of acute intracranial hemorrhage, mass effect,   midline shift or acute territorial infarct.    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 Small chronic   appearing infarcts in the right corona radiata, right caudate nucleus and   right lentiform nucleus are stable. Ex vacuo dilatation of the right   lateral ventricle is stable.    There is mild generalized cerebral volume loss and mild to moderate   patchy periventricular white matter hypoattenuation compatible with   chronic microvascular ischemic disease.      There is minimal patchy paranasal sinus mucosal thickening. Trace   secretions within the posterior right ethmoid air cell may represent   trapped secretions versus sinusitis.    The mastoids are clear bilaterally.    The patient is status post cataract lens placement surgery bilaterally.    The calvarium and skull base appear within normal limits.    IMPRESSION:  No CT evidence of acute intracranial pathology.  No interval change from   2022 at 6:02 PM.    < end of copied text >    Care Discussed with Consultants/Other Providers:

## 2022-06-21 RX ORDER — IPRATROPIUM/ALBUTEROL SULFATE 18-103MCG
3 AEROSOL WITH ADAPTER (GRAM) INHALATION
Qty: 0 | Refills: 0 | DISCHARGE
Start: 2022-06-21 | End: 2022-06-26

## 2022-06-23 ENCOUNTER — INPATIENT (INPATIENT)
Facility: HOSPITAL | Age: 72
LOS: 7 days | Discharge: NURSING, REHAB OR LONG-TERM CARE | DRG: 871 | End: 2022-07-01
Attending: STUDENT IN AN ORGANIZED HEALTH CARE EDUCATION/TRAINING PROGRAM | Admitting: HOSPITALIST
Payer: MEDICARE

## 2022-06-23 VITALS
HEART RATE: 121 BPM | RESPIRATION RATE: 19 BRPM | OXYGEN SATURATION: 96 % | TEMPERATURE: 98 F | WEIGHT: 156.09 LBS | DIASTOLIC BLOOD PRESSURE: 57 MMHG | SYSTOLIC BLOOD PRESSURE: 87 MMHG

## 2022-06-23 DIAGNOSIS — I63.9 CEREBRAL INFARCTION, UNSPECIFIED: ICD-10-CM

## 2022-06-23 DIAGNOSIS — I82.409 ACUTE EMBOLISM AND THROMBOSIS OF UNSPECIFIED DEEP VEINS OF UNSPECIFIED LOWER EXTREMITY: ICD-10-CM

## 2022-06-23 DIAGNOSIS — Z29.9 ENCOUNTER FOR PROPHYLACTIC MEASURES, UNSPECIFIED: ICD-10-CM

## 2022-06-23 DIAGNOSIS — Z90.710 ACQUIRED ABSENCE OF BOTH CERVIX AND UTERUS: Chronic | ICD-10-CM

## 2022-06-23 DIAGNOSIS — E78.5 HYPERLIPIDEMIA, UNSPECIFIED: ICD-10-CM

## 2022-06-23 DIAGNOSIS — N17.9 ACUTE KIDNEY FAILURE, UNSPECIFIED: ICD-10-CM

## 2022-06-23 DIAGNOSIS — A41.9 SEPSIS, UNSPECIFIED ORGANISM: ICD-10-CM

## 2022-06-23 DIAGNOSIS — E11.9 TYPE 2 DIABETES MELLITUS WITHOUT COMPLICATIONS: ICD-10-CM

## 2022-06-23 DIAGNOSIS — F03.90 UNSPECIFIED DEMENTIA WITHOUT BEHAVIORAL DISTURBANCE: ICD-10-CM

## 2022-06-23 DIAGNOSIS — F32.9 MAJOR DEPRESSIVE DISORDER, SINGLE EPISODE, UNSPECIFIED: ICD-10-CM

## 2022-06-23 PROBLEM — E03.9 HYPOTHYROIDISM, UNSPECIFIED: Chronic | Status: ACTIVE | Noted: 2022-06-11

## 2022-06-23 PROBLEM — N18.1 CHRONIC KIDNEY DISEASE, STAGE 1: Chronic | Status: ACTIVE | Noted: 2022-06-11

## 2022-06-23 LAB
ALBUMIN SERPL ELPH-MCNC: 1.5 G/DL — LOW (ref 3.3–5)
ALBUMIN SERPL ELPH-MCNC: 2 G/DL — LOW (ref 3.3–5)
ALP SERPL-CCNC: 125 U/L — HIGH (ref 40–120)
ALP SERPL-CCNC: 126 U/L — HIGH (ref 40–120)
ALT FLD-CCNC: 23 U/L — SIGNIFICANT CHANGE UP (ref 10–45)
ALT FLD-CCNC: 32 U/L — SIGNIFICANT CHANGE UP (ref 10–45)
ANION GAP SERPL CALC-SCNC: 17 MMOL/L — SIGNIFICANT CHANGE UP (ref 5–17)
ANION GAP SERPL CALC-SCNC: 19 MMOL/L — HIGH (ref 5–17)
ANISOCYTOSIS BLD QL: SLIGHT — SIGNIFICANT CHANGE UP
APPEARANCE UR: ABNORMAL
AST SERPL-CCNC: 64 U/L — HIGH (ref 10–40)
AST SERPL-CCNC: 81 U/L — HIGH (ref 10–40)
BACTERIA # UR AUTO: ABNORMAL /HPF
BASE EXCESS BLDV CALC-SCNC: -13.8 MMOL/L — LOW (ref -2–3)
BASOPHILS # BLD AUTO: 0 K/UL — SIGNIFICANT CHANGE UP (ref 0–0.2)
BASOPHILS NFR BLD AUTO: 0 % — SIGNIFICANT CHANGE UP (ref 0–2)
BILIRUB SERPL-MCNC: 0.3 MG/DL — SIGNIFICANT CHANGE UP (ref 0.2–1.2)
BILIRUB SERPL-MCNC: 0.4 MG/DL — SIGNIFICANT CHANGE UP (ref 0.2–1.2)
BILIRUB UR-MCNC: NEGATIVE — SIGNIFICANT CHANGE UP
BLD GP AB SCN SERPL QL: SIGNIFICANT CHANGE UP
BLOOD GAS COMMENTS, VENOUS: SIGNIFICANT CHANGE UP
BUN SERPL-MCNC: 101 MG/DL — HIGH (ref 7–23)
BUN SERPL-MCNC: 116 MG/DL — HIGH (ref 7–23)
BURR CELLS BLD QL SMEAR: PRESENT — SIGNIFICANT CHANGE UP
CALCIUM SERPL-MCNC: 6.6 MG/DL — LOW (ref 8.4–10.5)
CALCIUM SERPL-MCNC: 7.5 MG/DL — LOW (ref 8.4–10.5)
CHLORIDE SERPL-SCNC: 103 MMOL/L — SIGNIFICANT CHANGE UP (ref 96–108)
CHLORIDE SERPL-SCNC: 104 MMOL/L — SIGNIFICANT CHANGE UP (ref 96–108)
CK SERPL-CCNC: 946 U/L — HIGH (ref 25–170)
CO2 BLDV-SCNC: 15 MMOL/L — LOW (ref 22–26)
CO2 SERPL-SCNC: 17 MMOL/L — LOW (ref 22–31)
CO2 SERPL-SCNC: 18 MMOL/L — LOW (ref 22–31)
COLOR SPEC: ABNORMAL
CREAT SERPL-MCNC: 5.66 MG/DL — HIGH (ref 0.5–1.3)
CREAT SERPL-MCNC: 6.91 MG/DL — HIGH (ref 0.5–1.3)
DACRYOCYTES BLD QL SMEAR: SLIGHT — SIGNIFICANT CHANGE UP
DIFF PNL FLD: ABNORMAL
EGFR: 6 ML/MIN/1.73M2 — LOW
EGFR: 7 ML/MIN/1.73M2 — LOW
EOSINOPHIL # BLD AUTO: 0 K/UL — SIGNIFICANT CHANGE UP (ref 0–0.5)
EOSINOPHIL NFR BLD AUTO: 0 % — SIGNIFICANT CHANGE UP (ref 0–6)
EPI CELLS # UR: SIGNIFICANT CHANGE UP
GAS PNL BLDV: SIGNIFICANT CHANGE UP
GLUCOSE BLDC GLUCOMTR-MCNC: 274 MG/DL — HIGH (ref 70–99)
GLUCOSE SERPL-MCNC: 103 MG/DL — HIGH (ref 70–99)
GLUCOSE SERPL-MCNC: 310 MG/DL — HIGH (ref 70–99)
GLUCOSE UR QL: NEGATIVE — SIGNIFICANT CHANGE UP
HCO3 BLDV-SCNC: 14 MMOL/L — LOW (ref 22–29)
HCT VFR BLD CALC: 22.4 % — LOW (ref 34.5–45)
HCT VFR BLD CALC: 24.3 % — LOW (ref 34.5–45)
HGB BLD-MCNC: 7.4 G/DL — LOW (ref 11.5–15.5)
HGB BLD-MCNC: 8 G/DL — LOW (ref 11.5–15.5)
KETONES UR-MCNC: NEGATIVE — SIGNIFICANT CHANGE UP
LACTATE SERPL-SCNC: 1.3 MMOL/L — SIGNIFICANT CHANGE UP (ref 0.7–2)
LEUKOCYTE ESTERASE UR-ACNC: ABNORMAL
LYMPHOCYTES # BLD AUTO: 1.8 K/UL — SIGNIFICANT CHANGE UP (ref 1–3.3)
LYMPHOCYTES # BLD AUTO: 6 % — LOW (ref 13–44)
MAGNESIUM SERPL-MCNC: 1.5 MG/DL — LOW (ref 1.6–2.6)
MANUAL SMEAR VERIFICATION: SIGNIFICANT CHANGE UP
MCHC RBC-ENTMCNC: 26.8 PG — LOW (ref 27–34)
MCHC RBC-ENTMCNC: 26.9 PG — LOW (ref 27–34)
MCHC RBC-ENTMCNC: 32.9 GM/DL — SIGNIFICANT CHANGE UP (ref 32–36)
MCHC RBC-ENTMCNC: 33 GM/DL — SIGNIFICANT CHANGE UP (ref 32–36)
MCV RBC AUTO: 81.3 FL — SIGNIFICANT CHANGE UP (ref 80–100)
MCV RBC AUTO: 81.5 FL — SIGNIFICANT CHANGE UP (ref 80–100)
MONOCYTES # BLD AUTO: 0.9 K/UL — SIGNIFICANT CHANGE UP (ref 0–0.9)
MONOCYTES NFR BLD AUTO: 3 % — SIGNIFICANT CHANGE UP (ref 2–14)
NEUTROPHILS # BLD AUTO: 27.33 K/UL — HIGH (ref 1.8–7.4)
NEUTROPHILS NFR BLD AUTO: 86 % — HIGH (ref 43–77)
NEUTS BAND # BLD: 5 % — SIGNIFICANT CHANGE UP (ref 0–8)
NITRITE UR-MCNC: POSITIVE
NRBC # BLD: 0 /100 WBCS — SIGNIFICANT CHANGE UP (ref 0–0)
NRBC # BLD: 0 /100 — SIGNIFICANT CHANGE UP (ref 0–0)
PCO2 BLDV: 33 MMHG — LOW (ref 39–42)
PH BLDV: 7.23 — LOW (ref 7.32–7.43)
PH UR: 6 — SIGNIFICANT CHANGE UP (ref 5–8)
PHOSPHATE SERPL-MCNC: 5.5 MG/DL — HIGH (ref 2.5–4.5)
PLAT MORPH BLD: NORMAL — SIGNIFICANT CHANGE UP
PLATELET # BLD AUTO: 310 K/UL — SIGNIFICANT CHANGE UP (ref 150–400)
PLATELET # BLD AUTO: 355 K/UL — SIGNIFICANT CHANGE UP (ref 150–400)
PO2 BLDV: 43 MMHG — SIGNIFICANT CHANGE UP (ref 25–45)
POIKILOCYTOSIS BLD QL AUTO: SLIGHT — SIGNIFICANT CHANGE UP
POTASSIUM SERPL-MCNC: 3.1 MMOL/L — LOW (ref 3.5–5.3)
POTASSIUM SERPL-MCNC: 3.7 MMOL/L — SIGNIFICANT CHANGE UP (ref 3.5–5.3)
POTASSIUM SERPL-SCNC: 3.1 MMOL/L — LOW (ref 3.5–5.3)
POTASSIUM SERPL-SCNC: 3.7 MMOL/L — SIGNIFICANT CHANGE UP (ref 3.5–5.3)
PROT SERPL-MCNC: 5.8 G/DL — LOW (ref 6–8.3)
PROT SERPL-MCNC: 7.1 G/DL — SIGNIFICANT CHANGE UP (ref 6–8.3)
PROT UR-MCNC: 500 MG/DL
RAPID RVP RESULT: SIGNIFICANT CHANGE UP
RBC # BLD: 2.75 M/UL — LOW (ref 3.8–5.2)
RBC # BLD: 2.99 M/UL — LOW (ref 3.8–5.2)
RBC # FLD: 18.3 % — HIGH (ref 10.3–14.5)
RBC # FLD: 18.5 % — HIGH (ref 10.3–14.5)
RBC BLD AUTO: NORMAL — SIGNIFICANT CHANGE UP
RBC CASTS # UR COMP ASSIST: >50 /HPF (ref 0–4)
SAO2 % BLDV: 72.2 % — SIGNIFICANT CHANGE UP (ref 67–88)
SARS-COV-2 RNA SPEC QL NAA+PROBE: SIGNIFICANT CHANGE UP
SODIUM SERPL-SCNC: 139 MMOL/L — SIGNIFICANT CHANGE UP (ref 135–145)
SODIUM SERPL-SCNC: 139 MMOL/L — SIGNIFICANT CHANGE UP (ref 135–145)
SP GR SPEC: 1.01 — SIGNIFICANT CHANGE UP (ref 1.01–1.02)
TARGETS BLD QL SMEAR: SLIGHT — SIGNIFICANT CHANGE UP
UROBILINOGEN FLD QL: 1
WBC # BLD: 22.57 K/UL — HIGH (ref 3.8–10.5)
WBC # BLD: 30.03 K/UL — HIGH (ref 3.8–10.5)
WBC # FLD AUTO: 22.57 K/UL — HIGH (ref 3.8–10.5)
WBC # FLD AUTO: 30.03 K/UL — HIGH (ref 3.8–10.5)
WBC UR QL: >50 /HPF (ref 0–5)

## 2022-06-23 PROCEDURE — 74176 CT ABD & PELVIS W/O CONTRAST: CPT | Mod: 26,MA

## 2022-06-23 PROCEDURE — 99223 1ST HOSP IP/OBS HIGH 75: CPT

## 2022-06-23 PROCEDURE — 71045 X-RAY EXAM CHEST 1 VIEW: CPT | Mod: 26

## 2022-06-23 PROCEDURE — 93010 ELECTROCARDIOGRAM REPORT: CPT

## 2022-06-23 PROCEDURE — 99285 EMERGENCY DEPT VISIT HI MDM: CPT

## 2022-06-23 RX ORDER — ACETAMINOPHEN 500 MG
650 TABLET ORAL ONCE
Refills: 0 | Status: COMPLETED | OUTPATIENT
Start: 2022-06-23 | End: 2022-06-23

## 2022-06-23 RX ORDER — CEFEPIME 1 G/1
500 INJECTION, POWDER, FOR SOLUTION INTRAMUSCULAR; INTRAVENOUS DAILY
Refills: 0 | Status: DISCONTINUED | OUTPATIENT
Start: 2022-06-24 | End: 2022-07-01

## 2022-06-23 RX ORDER — ACETAMINOPHEN 500 MG
650 TABLET ORAL EVERY 6 HOURS
Refills: 0 | Status: DISCONTINUED | OUTPATIENT
Start: 2022-06-23 | End: 2022-07-01

## 2022-06-23 RX ORDER — APIXABAN 2.5 MG/1
5 TABLET, FILM COATED ORAL EVERY 12 HOURS
Refills: 0 | Status: DISCONTINUED | OUTPATIENT
Start: 2022-06-23 | End: 2022-06-24

## 2022-06-23 RX ORDER — SODIUM CHLORIDE 9 MG/ML
1000 INJECTION, SOLUTION INTRAVENOUS
Refills: 0 | Status: DISCONTINUED | OUTPATIENT
Start: 2022-06-23 | End: 2022-07-01

## 2022-06-23 RX ORDER — INSULIN LISPRO 100/ML
VIAL (ML) SUBCUTANEOUS AT BEDTIME
Refills: 0 | Status: DISCONTINUED | OUTPATIENT
Start: 2022-06-23 | End: 2022-06-23

## 2022-06-23 RX ORDER — PANTOPRAZOLE SODIUM 20 MG/1
40 TABLET, DELAYED RELEASE ORAL DAILY
Refills: 0 | Status: DISCONTINUED | OUTPATIENT
Start: 2022-06-23 | End: 2022-07-01

## 2022-06-23 RX ORDER — SERTRALINE 25 MG/1
50 TABLET, FILM COATED ORAL DAILY
Refills: 0 | Status: DISCONTINUED | OUTPATIENT
Start: 2022-06-23 | End: 2022-07-01

## 2022-06-23 RX ORDER — POTASSIUM CHLORIDE 20 MEQ
10 PACKET (EA) ORAL
Refills: 0 | Status: COMPLETED | OUTPATIENT
Start: 2022-06-23 | End: 2022-06-24

## 2022-06-23 RX ORDER — PHENYLEPHRINE HYDROCHLORIDE 10 MG/ML
0.1 INJECTION INTRAVENOUS
Qty: 40 | Refills: 0 | Status: DISCONTINUED | OUTPATIENT
Start: 2022-06-23 | End: 2022-06-26

## 2022-06-23 RX ORDER — MAGNESIUM SULFATE 500 MG/ML
1 VIAL (ML) INJECTION ONCE
Refills: 0 | Status: COMPLETED | OUTPATIENT
Start: 2022-06-23 | End: 2022-06-23

## 2022-06-23 RX ORDER — TRAMADOL HYDROCHLORIDE 50 MG/1
25 TABLET ORAL EVERY 12 HOURS
Refills: 0 | Status: DISCONTINUED | OUTPATIENT
Start: 2022-06-23 | End: 2022-06-25

## 2022-06-23 RX ORDER — GLUCAGON INJECTION, SOLUTION 0.5 MG/.1ML
1 INJECTION, SOLUTION SUBCUTANEOUS ONCE
Refills: 0 | Status: DISCONTINUED | OUTPATIENT
Start: 2022-06-23 | End: 2022-06-23

## 2022-06-23 RX ORDER — CHLORHEXIDINE GLUCONATE 213 G/1000ML
1 SOLUTION TOPICAL
Refills: 0 | Status: DISCONTINUED | OUTPATIENT
Start: 2022-06-23 | End: 2022-07-01

## 2022-06-23 RX ORDER — INSULIN LISPRO 100/ML
VIAL (ML) SUBCUTANEOUS
Refills: 0 | Status: DISCONTINUED | OUTPATIENT
Start: 2022-06-23 | End: 2022-06-23

## 2022-06-23 RX ORDER — PIPERACILLIN AND TAZOBACTAM 4; .5 G/20ML; G/20ML
3.38 INJECTION, POWDER, LYOPHILIZED, FOR SOLUTION INTRAVENOUS EVERY 12 HOURS
Refills: 0 | Status: DISCONTINUED | OUTPATIENT
Start: 2022-06-23 | End: 2022-06-23

## 2022-06-23 RX ORDER — DEXTROSE 50 % IN WATER 50 %
25 SYRINGE (ML) INTRAVENOUS ONCE
Refills: 0 | Status: DISCONTINUED | OUTPATIENT
Start: 2022-06-23 | End: 2022-07-01

## 2022-06-23 RX ORDER — ATORVASTATIN CALCIUM 80 MG/1
80 TABLET, FILM COATED ORAL AT BEDTIME
Refills: 0 | Status: DISCONTINUED | OUTPATIENT
Start: 2022-06-23 | End: 2022-07-01

## 2022-06-23 RX ORDER — SODIUM CHLORIDE 9 MG/ML
2000 INJECTION INTRAMUSCULAR; INTRAVENOUS; SUBCUTANEOUS ONCE
Refills: 0 | Status: COMPLETED | OUTPATIENT
Start: 2022-06-23 | End: 2022-06-23

## 2022-06-23 RX ORDER — INSULIN GLARGINE 100 [IU]/ML
6 INJECTION, SOLUTION SUBCUTANEOUS AT BEDTIME
Refills: 0 | Status: DISCONTINUED | OUTPATIENT
Start: 2022-06-23 | End: 2022-07-01

## 2022-06-23 RX ORDER — SODIUM CHLORIDE 9 MG/ML
1000 INJECTION INTRAMUSCULAR; INTRAVENOUS; SUBCUTANEOUS ONCE
Refills: 0 | Status: COMPLETED | OUTPATIENT
Start: 2022-06-23 | End: 2022-06-23

## 2022-06-23 RX ORDER — VANCOMYCIN HCL 1 G
1000 VIAL (EA) INTRAVENOUS ONCE
Refills: 0 | Status: COMPLETED | OUTPATIENT
Start: 2022-06-23 | End: 2022-06-23

## 2022-06-23 RX ORDER — SODIUM BICARBONATE 1 MEQ/ML
0.27 SYRINGE (ML) INTRAVENOUS
Qty: 150 | Refills: 0 | Status: DISCONTINUED | OUTPATIENT
Start: 2022-06-23 | End: 2022-06-24

## 2022-06-23 RX ORDER — DEXTROSE 50 % IN WATER 50 %
25 SYRINGE (ML) INTRAVENOUS ONCE
Refills: 0 | Status: DISCONTINUED | OUTPATIENT
Start: 2022-06-23 | End: 2022-06-28

## 2022-06-23 RX ORDER — DEXTROSE 50 % IN WATER 50 %
15 SYRINGE (ML) INTRAVENOUS ONCE
Refills: 0 | Status: DISCONTINUED | OUTPATIENT
Start: 2022-06-23 | End: 2022-06-23

## 2022-06-23 RX ORDER — SODIUM CHLORIDE 9 MG/ML
1000 INJECTION INTRAMUSCULAR; INTRAVENOUS; SUBCUTANEOUS
Refills: 0 | Status: DISCONTINUED | OUTPATIENT
Start: 2022-06-23 | End: 2022-06-23

## 2022-06-23 RX ORDER — DEXTROSE 50 % IN WATER 50 %
12.5 SYRINGE (ML) INTRAVENOUS ONCE
Refills: 0 | Status: DISCONTINUED | OUTPATIENT
Start: 2022-06-23 | End: 2022-07-01

## 2022-06-23 RX ORDER — SODIUM BICARBONATE 1 MEQ/ML
50 SYRINGE (ML) INTRAVENOUS ONCE
Refills: 0 | Status: COMPLETED | OUTPATIENT
Start: 2022-06-23 | End: 2022-06-23

## 2022-06-23 RX ORDER — INSULIN LISPRO 100/ML
VIAL (ML) SUBCUTANEOUS EVERY 6 HOURS
Refills: 0 | Status: DISCONTINUED | OUTPATIENT
Start: 2022-06-23 | End: 2022-06-24

## 2022-06-23 RX ORDER — MIRTAZAPINE 45 MG/1
15 TABLET, ORALLY DISINTEGRATING ORAL AT BEDTIME
Refills: 0 | Status: DISCONTINUED | OUTPATIENT
Start: 2022-06-23 | End: 2022-07-01

## 2022-06-23 RX ORDER — CEFEPIME 1 G/1
INJECTION, POWDER, FOR SOLUTION INTRAMUSCULAR; INTRAVENOUS
Refills: 0 | Status: DISCONTINUED | OUTPATIENT
Start: 2022-06-23 | End: 2022-06-23

## 2022-06-23 RX ORDER — INSULIN GLARGINE 100 [IU]/ML
6 INJECTION, SOLUTION SUBCUTANEOUS AT BEDTIME
Refills: 0 | Status: DISCONTINUED | OUTPATIENT
Start: 2022-06-23 | End: 2022-06-23

## 2022-06-23 RX ORDER — CEFEPIME 1 G/1
2000 INJECTION, POWDER, FOR SOLUTION INTRAMUSCULAR; INTRAVENOUS ONCE
Refills: 0 | Status: COMPLETED | OUTPATIENT
Start: 2022-06-23 | End: 2022-06-23

## 2022-06-23 RX ADMIN — Medication 100 MILLIEQUIVALENT(S): at 23:04

## 2022-06-23 RX ADMIN — Medication 100 GRAM(S): at 22:43

## 2022-06-23 RX ADMIN — Medication 250 MILLIGRAM(S): at 12:53

## 2022-06-23 RX ADMIN — INSULIN GLARGINE 6 UNIT(S): 100 INJECTION, SOLUTION SUBCUTANEOUS at 22:43

## 2022-06-23 RX ADMIN — SODIUM CHLORIDE 1000 MILLILITER(S): 9 INJECTION INTRAMUSCULAR; INTRAVENOUS; SUBCUTANEOUS at 18:56

## 2022-06-23 RX ADMIN — CEFEPIME 2000 MILLIGRAM(S): 1 INJECTION, POWDER, FOR SOLUTION INTRAMUSCULAR; INTRAVENOUS at 12:50

## 2022-06-23 RX ADMIN — Medication 125 MEQ/KG/HR: at 18:26

## 2022-06-23 RX ADMIN — Medication 1000 MILLIGRAM(S): at 15:26

## 2022-06-23 RX ADMIN — ATORVASTATIN CALCIUM 80 MILLIGRAM(S): 80 TABLET, FILM COATED ORAL at 22:04

## 2022-06-23 RX ADMIN — Medication 100 MILLIEQUIVALENT(S): at 22:16

## 2022-06-23 RX ADMIN — PIPERACILLIN AND TAZOBACTAM 25 GRAM(S): 4; .5 INJECTION, POWDER, LYOPHILIZED, FOR SOLUTION INTRAVENOUS at 17:56

## 2022-06-23 RX ADMIN — PHENYLEPHRINE HYDROCHLORIDE 2.66 MICROGRAM(S)/KG/MIN: 10 INJECTION INTRAVENOUS at 18:55

## 2022-06-23 RX ADMIN — Medication 50 MILLIEQUIVALENT(S): at 18:26

## 2022-06-23 RX ADMIN — SODIUM CHLORIDE 2000 MILLILITER(S): 9 INJECTION INTRAMUSCULAR; INTRAVENOUS; SUBCUTANEOUS at 12:52

## 2022-06-23 RX ADMIN — MIRTAZAPINE 15 MILLIGRAM(S): 45 TABLET, ORALLY DISINTEGRATING ORAL at 22:04

## 2022-06-23 RX ADMIN — Medication 650 MILLIGRAM(S): at 18:12

## 2022-06-23 RX ADMIN — SODIUM CHLORIDE 2000 MILLILITER(S): 9 INJECTION INTRAMUSCULAR; INTRAVENOUS; SUBCUTANEOUS at 15:26

## 2022-06-23 RX ADMIN — CEFEPIME 100 MILLIGRAM(S): 1 INJECTION, POWDER, FOR SOLUTION INTRAMUSCULAR; INTRAVENOUS at 12:13

## 2022-06-23 NOTE — H&P ADULT - HISTORY OF PRESENT ILLNESS
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 Pt was discharged from [facility] to  3 days ago s/p transient L facial droop, CT Head negative for acute findings, hospital course complicated by LLE DVT started on heparin gtt due to renal dysfunction with further complication of hematuria due to likely UTI. Pt was transitioned to [facility] and discharged to Rehab with Ceftin to complete course of abx. While at Rehab, pt noted to have abdominal discomfort, hypotensive, and with leukocytosis of 36 on bloodwork. She was started on IVF + Ceftriaxone + Zosyn. Repeat bloodwork with improve WBC to 27. Pt today, however, noted to have significant abdominal pain and discomfort, screaming in pain. Her provider requested pt to be transferred to the ED for higher level of care.  In the ED pt hypotensive, improved s/p IVF. Minimally answering questions, screaming in pain.

## 2022-06-23 NOTE — ED PROVIDER NOTE - PROGRESS NOTE DETAILS
Hanna GOMEZ: Patient found to have acute on chronic renal failure; concern for urinary retention; had hx of the same; rodriguez catheter placed by nursing staff with OVER 1500cc; sepsis protocol had been initiated; patient was endorsed to Dr. Patel for admission; ICU consulted 2/2 to hypotension and admitted to ICU.

## 2022-06-23 NOTE — ED PROVIDER NOTE - CLINICAL SUMMARY MEDICAL DECISION MAKING FREE TEXT BOX
72F hx of CVA with L hemiparesis since 2003, HTN, HLD, hypothyroid, CKD3, T2DM on insulin, depression p/w elevated WBC received zosyn from facility. discharged last week and found to have LLE DVT. is on Eliquis 5 mg BID (as per previous note avoided 10 mg BID for the week of initiation due to hematuria) at time of admission SONO c/w obstructive MAXIMILIAN and improved with rodriguez and rec d/c w/rodriguez. unsure when rodriguez was discontinued

## 2022-06-23 NOTE — ED PROVIDER NOTE - PHYSICAL EXAMINATION
General:     ill appearing  Eyes: PERRL  Head:     NC/AT, dry oral mucosa  Neck:     trachea midline  Lungs:     CTA b/l  CVS:     tachycardia   Abd:     distended abdomen with tenderness  Ext:   no deformities   Neuro: AAOx2

## 2022-06-23 NOTE — ED PROVIDER NOTE - NS ED ATTENDING STATEMENT MOD
This was a shared visit with the DAVIDE. I reviewed and verified the documentation and independently performed the documented:

## 2022-06-23 NOTE — ED PROVIDER NOTE - ATTENDING APP SHARED VISIT CONTRIBUTION OF CARE
I, Kelsi Ferreira DO,  performed the initial face to face bedside interview with this patient regarding history of present illness, review of symptoms and relevant past medical, social and family history.  I completed an independent physical examination.  I was the initial provider who evaluated this patient.   I personally saw the patient and performed a substantive portion of the visit including all aspects of the medical decision making.  I have signed out the follow up of any pending tests (i.e. labs, radiological studies) to the ACP.  I have communicated the patient’s plan of care and disposition with the ACP.

## 2022-06-23 NOTE — CONSULT NOTE ADULT - PROBLEM SELECTOR RECOMMENDATION 9
On full dose AC for DVT, no SCD's with recent DVT  Protonix 40mg PO daily for GI  Bedrest, NPO ice chips and meds while hypotensive  Full Code status Likely urinary source.  Follow urine and blood cultures, patient with e. coli UTI on prior admission, and apparently failed outpatient therapy, will escalate antibiotic therapy to cefepime renally dosed.  Trend fever curve and leukocytosis, Tylenol PRN for fever

## 2022-06-23 NOTE — CONSULT NOTE ADULT - PROBLEM SELECTOR RECOMMENDATION 2
With metabolic acidosis.  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  Aggressive hydration with sodium bicarbonate infusion, monitor electrolytes.  Monitor I&O closely, patient requires Mckeon and on prior admission seen by Urology who suggested Mckeon may need to be chronic indwelling due to obstruction

## 2022-06-23 NOTE — CONSULT NOTE ADULT - PROBLEM SELECTOR RECOMMENDATION 3
Slightly more anemic on this admission, cautiously continue full anticoagulation and monitor for signs of bleeding.  Trend serial HH, low threshold to stop anticoagulation

## 2022-06-23 NOTE — PATIENT PROFILE ADULT - FALL HARM RISK - HARM RISK INTERVENTIONS

## 2022-06-23 NOTE — PROGRESS NOTE ADULT - SUBJECTIVE AND OBJECTIVE BOX
Patient is a 72y old  Female who presents with a chief complaint of Sepsis (2022 17:22)      BRIEF HOSPITAL COURSE: 72F hx of CVA with dense L hemiparesis since , HTN, HLD, hypothyroid, CKD3, T2DM on insulin, depression presented to the ED from  with abnormal labs, sepsis x 1-2 days. Pt was discharged from [facility] to  3 days ago s/p transient L facial droop, CT Head negative for acute findings, hospital course complicated by LLE DVT started on heparin gtt due to renal dysfunction with further complication of hematuria due to likely UTI. Pt was transitioned to Eliquis and discharged to Rehab with Ceftin to complete course of abx. While at Rehab, pt noted to have abdominal discomfort, hypotensive, and with leukocytosis of 36 on bloodwork. She was started on IVF + Ceftriaxone + Zosyn. Repeat bloodwork with improve WBC to 27. Pt today, however, noted to have significant abdominal pain and discomfort, screaming in pain. Her provider requested pt to be transferred to the ED for higher level of care.  In the ED pt hypotensive, improved s/p IVF.    Events last 24 hours: Septic Shock requiring Ryan to maintain MAP >65. 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  EYES: No eye pain, visual disturbances, or discharge.  ENMT:  No difficulty hearing, tinnitus, or vertigo. No sinus or throat pain.  NECK: No pain or stiffness.  RESPIRATORY: No shortness of breath, cough, or wheezing.  CARDIOVASCULAR: No chest pain, palpitations, dizziness, or leg swelling.  GASTROINTESTINAL: No abdominal or epigastric pain. No nausea, vomiting, diarrhea, or constipation. No hematemesis, melena, or hematochezia.  GENITOURINARY: No dysuria, increased frequency, hematuria, or incontinence.  NEUROLOGICAL: No headaches, memory loss, loss of strength, numbness, or tremors.  SKIN: No itching, burning, rashes, or lesions.  MUSCULOSKELETAL: No joint pain or swelling. No muscle, back, or extremity pain.  PSYCHIATRIC: No depression, anxiety, mood swings, or difficulty sleeping.    Medications:    phenylephrine    Infusion 0.1 MICROgram(s)/kG/Min IV Continuous <Continuous>      acetaminophen     Tablet .. 650 milliGRAM(s) Oral every 6 hours PRN  mirtazapine 15 milliGRAM(s) Oral at bedtime  sertraline 50 milliGRAM(s) Oral daily  traMADol 25 milliGRAM(s) Oral every 12 hours PRN      apixaban 5 milliGRAM(s) Oral every 12 hours    pantoprazole    Tablet 40 milliGRAM(s) Oral daily      atorvastatin 80 milliGRAM(s) Oral at bedtime  dextrose 50% Injectable 25 Gram(s) IV Push once  dextrose 50% Injectable 12.5 Gram(s) IV Push once  dextrose 50% Injectable 25 Gram(s) IV Push once  insulin glargine Injectable (LANTUS) 6 Unit(s) SubCutaneous at bedtime  insulin lispro (ADMELOG) corrective regimen sliding scale   SubCutaneous every 6 hours    dextrose 5%. 1000 milliLiter(s) IV Continuous <Continuous>  dextrose 5%. 1000 milliLiter(s) IV Continuous <Continuous>  magnesium sulfate  IVPB 1 Gram(s) IV Intermittent once  potassium chloride  10 mEq/100 mL IVPB 10 milliEquivalent(s) IV Intermittent every 1 hour  sodium bicarbonate  Infusion 0.265 mEq/kG/Hr IV Continuous <Continuous>      chlorhexidine 2% Cloths 1 Application(s) Topical <User Schedule>            ICU Vital Signs Last 24 Hrs  T(C): 36.9 (2022 19:45), Max: 37.1 (2022 12:03)  T(F): 98.5 (2022 19:45), Max: 98.8 (2022 12:03)  HR: 107 (2022 21:30) (102 - 121)  BP: 103/67 (2022 21:30) (73/50 - 104/57)  BP(mean): 74 (2022 21:30) (49 - 74)  ABP: --  ABP(mean): --  RR: 13 (2022 21:30) (13 - 23)  SpO2: 100% (2022 21:30) (70% - 100%)          I&O's Detail    2022 07:01  -  2022 21:43  --------------------------------------------------------  IN:    Phenylephrine: 21.1 mL    Sodium Bicarbonate: 375 mL  Total IN: 396.1 mL    OUT:    Indwelling Catheter - Urethral (mL): 350 mL  Total OUT: 350 mL    Total NET: 46.1 mL          LABS:                        7.4    22.57 )-----------( 310      ( 2022 20:40 )             22.4         139  |  104  |  101<H>  ----------------------------<  310<H>  3.1<L>   |  18<L>  |  5.66<H>    Ca    6.6<L>      2022 20:40  Phos  5.5       Mg     1.5         TPro  5.8<L>  /  Alb  1.5<L>  /  TBili  0.3  /  DBili  x   /  AST  64<H>  /  ALT  23  /  AlkPhos  125<H>        CARDIAC MARKERS ( 2022 12:19 )  x     / x     / 946 U/L / x     / x          CAPILLARY BLOOD GLUCOSE          Urinalysis Basic - ( 2022 15:30 )    Color: Brown / Appearance: very cloudy / S.015 / pH: x  Gluc: x / Ketone: Negative  / Bili: Negative / Urobili: 1   Blood: x / Protein: 500 mg/dL / Nitrite: Positive   Leuk Esterase: Moderate / RBC: >50 /HPF / WBC >50 /HPF   Sq Epi: x / Non Sq Epi: Neg.-Few / Bacteria: Moderate /HPF      CULTURES:  Rapid RVP Result: NotDetec ( @ 12:21)      Physical Examination:      General: Well appearing, lying in bed in NAD.      HEENT: Pupils equal, reactive to light. Symmetric. No scleral icterus or injection.    PULM: Clear to auscultation B/L. No wheezes, rales, or rhonchi apprecaited. No significant sputum production or increased respiratory effort.    NECK: Supple, no lymphadenopathy, trachea midline.    CVS: Regular rate and rhythm, no murmurs appreciated, +s1/s2.    ABD: Soft, nondistended, nontender, normoactive bowel sounds.    EXT: No edema, nontender.    SKIN: Warm and well perfused, no rashes noted.    NEURO: Alert, pleasantly confused, interactive, nonfocal.    RADIOLOGY: < from: CT Abdomen and Pelvis No Cont (22 @ 14:09) >  ACC: [id number] EXAM:  CT ABDOMEN AND PELVIS                          PROCEDURE DATE:  2022          INTERPRETATION:  CLINICAL INFORMATION: Abdominal pain    COMPARISON: None.    CONTRAST/COMPLICATIONS:  IV Contrast: NONE  Oral Contrast: NONE  Complications: None reported at time of study completion    PROCEDURE:  CT of the Abdomen and Pelvis was performed.  Sagittal and coronal reformats were performed.    FINDINGS: The examination is limited by lack of IV/oral contrast.  LOWER CHEST: Small bilateral pleural effusions. Mild bilateral   atelectasis. Prominence of the central pulmonary arteries, suggestive of   pulmonary arterial hypertension.    LIVER: Within normal limits.  BILE DUCTS: Normal caliber.  GALLBLADDER: Within normal limits.  SPLEEN: Within normal limits.  PANCREAS: Within normal limits.  ADRENALS: Nonspecific mild adrenal thickening bilaterally.  KIDNEYS/URETERS: No evidence for a calculus in the kidneys or ureters. No   hydronephrosis. Bilateral extrarenal pelvises. Apparent 1.7 cm   heterogeneous left renal lesion with Hounsfield unit of 33 (image 53   series 2); if clinically indicated, abdominal MR without and with IV   contrast may be pursued to rule out a solid malignant neoplasm.    BLADDER: Mckeon catheter in the urinary bladder which is collapsed.  REPRODUCTIVE ORGANS: The uterus is not visualized, probably surgically   absent.    BOWEL: No bowel obstruction or grossly thickened bowel wall. Colonic   diverticulosis without evidence of diverticulitis. Appendix within normal   limits. Moderate amount of stool in the rectum.  PERITONEUM: No free air or ascites.  VESSELS: Calcified atherosclerotic disease.  RETROPERITONEUM/LYMPH NODES: No lymphadenopathy.  ABDOMINAL WALL: Small fat-containing umbilical hernia.  BONES: Mild degenerative spondylosis.    IMPRESSION:  No bowel obstruction or grossly thickened bowel wall. Colonic   diverticulosis without evidence of diverticulitis. Appendix within normal   limits. Moderate amount of stool in the rectum.    Small bilateral pleural effusions    Apparent 1.7 cm heterogeneous left renal lesion with Hounsfield unit of   33; if clinically indicated, abdominal MR without and with IV contrast   may be pursued to rule out a solid malignant neoplasm.    --- End of Report ---            JUAN CASTANEDA MD; Attending Radiologist  This document has been electronically signed. 2022  2:50PM    < end of copied text >     Patient is a 72y old  Female who presents with a chief complaint of Sepsis (2022 17:22)      BRIEF HOSPITAL COURSE: 72F hx of CVA with dense L hemiparesis since , HTN, HLD, hypothyroid, CKD3, T2DM on insulin, depression presented to the ED from  with abnormal labs, sepsis x 1-2 days. Pt was discharged from [facility] to  3 days ago s/p transient L facial droop, CT Head negative for acute findings, hospital course complicated by LLE DVT started on heparin gtt due to renal dysfunction with further complication of hematuria due to likely UTI. Pt was transitioned to Eliquis and discharged to Rehab with Ceftin to complete course of abx. While at Rehab, pt noted to have abdominal discomfort, hypotensive, and with leukocytosis of 36 on bloodwork. She was started on IVF + Ceftriaxone + Zosyn. Repeat bloodwork with improve WBC to 27. Pt today, however, noted to have significant abdominal pain and discomfort, screaming in pain. Her provider requested pt to be transferred to the ED for higher level of care.  In the ED pt hypotensive, improved s/p IVF.    Events last 24 hours: Septic Shock requiring Ryan to maintain MAP >65. 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  EYES: No eye pain, visual disturbances, or discharge.  ENMT:  No difficulty hearing, tinnitus, or vertigo. No sinus or throat pain.  NECK: No pain or stiffness.  RESPIRATORY: No shortness of breath, cough, or wheezing.  CARDIOVASCULAR: No chest pain, palpitations, dizziness, or leg swelling.  GASTROINTESTINAL: No abdominal or epigastric pain. No nausea, vomiting, diarrhea, or constipation. No hematemesis, melena, or hematochezia.  GENITOURINARY: No dysuria, increased frequency, hematuria, or incontinence.  NEUROLOGICAL: No headaches, memory loss, loss of strength, numbness, or tremors.  SKIN: No itching, burning, rashes, or lesions.  MUSCULOSKELETAL: No joint pain or swelling. No muscle, back, or extremity pain.  PSYCHIATRIC: No depression, anxiety, mood swings, or difficulty sleeping.    Medications:    phenylephrine    Infusion 0.1 MICROgram(s)/kG/Min IV Continuous <Continuous>      acetaminophen     Tablet .. 650 milliGRAM(s) Oral every 6 hours PRN  mirtazapine 15 milliGRAM(s) Oral at bedtime  sertraline 50 milliGRAM(s) Oral daily  traMADol 25 milliGRAM(s) Oral every 12 hours PRN      apixaban 5 milliGRAM(s) Oral every 12 hours    pantoprazole    Tablet 40 milliGRAM(s) Oral daily      atorvastatin 80 milliGRAM(s) Oral at bedtime  dextrose 50% Injectable 25 Gram(s) IV Push once  dextrose 50% Injectable 12.5 Gram(s) IV Push once  dextrose 50% Injectable 25 Gram(s) IV Push once  insulin glargine Injectable (LANTUS) 6 Unit(s) SubCutaneous at bedtime  insulin lispro (ADMELOG) corrective regimen sliding scale   SubCutaneous every 6 hours    dextrose 5%. 1000 milliLiter(s) IV Continuous <Continuous>  dextrose 5%. 1000 milliLiter(s) IV Continuous <Continuous>  magnesium sulfate  IVPB 1 Gram(s) IV Intermittent once  potassium chloride  10 mEq/100 mL IVPB 10 milliEquivalent(s) IV Intermittent every 1 hour  sodium bicarbonate  Infusion 0.265 mEq/kG/Hr IV Continuous <Continuous>      chlorhexidine 2% Cloths 1 Application(s) Topical <User Schedule>            ICU Vital Signs Last 24 Hrs  T(C): 36.9 (2022 19:45), Max: 37.1 (2022 12:03)  T(F): 98.5 (2022 19:45), Max: 98.8 (2022 12:03)  HR: 107 (2022 21:30) (102 - 121)  BP: 103/67 (2022 21:30) (73/50 - 104/57)  BP(mean): 74 (2022 21:30) (49 - 74)  ABP: --  ABP(mean): --  RR: 13 (2022 21:30) (13 - 23)  SpO2: 100% (2022 21:30) (70% - 100%)          I&O's Detail    2022 07:01  -  2022 21:43  --------------------------------------------------------  IN:    Phenylephrine: 21.1 mL    Sodium Bicarbonate: 375 mL  Total IN: 396.1 mL    OUT:    Indwelling Catheter - Urethral (mL): 350 mL  Total OUT: 350 mL    Total NET: 46.1 mL          LABS:                        7.4    22.57 )-----------( 310      ( 2022 20:40 )             22.4         139  |  104  |  101<H>  ----------------------------<  310<H>  3.1<L>   |  18<L>  |  5.66<H>    Ca    6.6<L>      2022 20:40  Phos  5.5       Mg     1.5         TPro  5.8<L>  /  Alb  1.5<L>  /  TBili  0.3  /  DBili  x   /  AST  64<H>  /  ALT  23  /  AlkPhos  125<H>        CARDIAC MARKERS ( 2022 12:19 )  x     / x     / 946 U/L / x     / x          CAPILLARY BLOOD GLUCOSE          Urinalysis Basic - ( 2022 15:30 )    Color: Brown / Appearance: very cloudy / S.015 / pH: x  Gluc: x / Ketone: Negative  / Bili: Negative / Urobili: 1   Blood: x / Protein: 500 mg/dL / Nitrite: Positive   Leuk Esterase: Moderate / RBC: >50 /HPF / WBC >50 /HPF   Sq Epi: x / Non Sq Epi: Neg.-Few / Bacteria: Moderate /HPF      CULTURES:  Rapid RVP Result: NotDetec ( @ 12:21)      Physical Examination:      General: Well appearing, lying in bed screaming    HEENT: Pupils equal, reactive to light. Symmetric. No scleral icterus or injection.    PULM: Clear to auscultation B/L. No wheezes, rales, or rhonchi apprecaited. No significant sputum production or increased respiratory effort.    NECK: Supple, no lymphadenopathy, trachea midline.    CVS: Regular rate and rhythm, no murmurs appreciated, +s1/s2.    ABD: Soft, nondistended, nontender, normoactive bowel sounds.    EXT: No edema, nontender.    SKIN: Warm and well perfused, no rashes noted.    NEURO: Alert, pleasantly confused, interactive, nonfocal. LUE contracted    RADIOLOGY: < from: CT Abdomen and Pelvis No Cont (22 @ 14:09) >  ACC: [id number] EXAM:  CT ABDOMEN AND PELVIS                          PROCEDURE DATE:  2022          INTERPRETATION:  CLINICAL INFORMATION: Abdominal pain    COMPARISON: None.    CONTRAST/COMPLICATIONS:  IV Contrast: NONE  Oral Contrast: NONE  Complications: None reported at time of study completion    PROCEDURE:  CT of the Abdomen and Pelvis was performed.  Sagittal and coronal reformats were performed.    FINDINGS: The examination is limited by lack of IV/oral contrast.  LOWER CHEST: Small bilateral pleural effusions. Mild bilateral   atelectasis. Prominence of the central pulmonary arteries, suggestive of   pulmonary arterial hypertension.    LIVER: Within normal limits.  BILE DUCTS: Normal caliber.  GALLBLADDER: Within normal limits.  SPLEEN: Within normal limits.  PANCREAS: Within normal limits.  ADRENALS: Nonspecific mild adrenal thickening bilaterally.  KIDNEYS/URETERS: No evidence for a calculus in the kidneys or ureters. No   hydronephrosis. Bilateral extrarenal pelvises. Apparent 1.7 cm   heterogeneous left renal lesion with Hounsfield unit of 33 (image 53   series 2); if clinically indicated, abdominal MR without and with IV   contrast may be pursued to rule out a solid malignant neoplasm.    BLADDER: Mckeon catheter in the urinary bladder which is collapsed.  REPRODUCTIVE ORGANS: The uterus is not visualized, probably surgically   absent.    BOWEL: No bowel obstruction or grossly thickened bowel wall. Colonic   diverticulosis without evidence of diverticulitis. Appendix within normal   limits. Moderate amount of stool in the rectum.  PERITONEUM: No free air or ascites.  VESSELS: Calcified atherosclerotic disease.  RETROPERITONEUM/LYMPH NODES: No lymphadenopathy.  ABDOMINAL WALL: Small fat-containing umbilical hernia.  BONES: Mild degenerative spondylosis.    IMPRESSION:  No bowel obstruction or grossly thickened bowel wall. Colonic   diverticulosis without evidence of diverticulitis. Appendix within normal   limits. Moderate amount of stool in the rectum.    Small bilateral pleural effusions    Apparent 1.7 cm heterogeneous left renal lesion with Hounsfield unit of   33; if clinically indicated, abdominal MR without and with IV contrast   may be pursued to rule out a solid malignant neoplasm.    --- End of Report ---            JUAN CASTANEDA MD; Attending Radiologist  This document has been electronically signed. 2022  2:50PM    < end of copied text >

## 2022-06-23 NOTE — PROGRESS NOTE ADULT - ASSESSMENT
72F hx of CVA with dense L hemiparesis since 2003, HTN, HLD, hypothyroid, CKD3, T2DM on insulin, depression admitted for    1. Septic shock 2/2 UTI  2. Anemia  3. MAXIMILIAN on CKD  4. Anion Gap Metabolic Acidosis    Neuro:  - Alert, interactive, pleasantly confused able to make needs known  - Avoid Neuro Deliriogenic / sedative medications    CV:   - actively titrating Ryan to maintain MAP >65    Pulm:  - Supplemental oxygen as needed to maintain spo2 > 94%  - Incentive spirometry when able                    GI:  - Cont IV Protonix 40mg IVP Daily,   - Enteral feeds as tolerated to avoid Stress ulceration and optimize nutritional state when indicated    Renal:  - Even to net negative fluid balance as tolerated by hemodynamics and renal fx.    - Preserved Renal Function Continue to monitor Bun/Cr and UOP  - Replacing electrolytes as needed with Goal K> 4, PO> 3, Mg> 2               - Strict I&O's  - Avoid Nephro toxic medication  - Renally dose meds    Heme:  - SCDs for DVT/PE ppx   - *** AC    ID:  - WBC **,  remains afebrile with no antipyretics administered   - Continue ABX  ****  - Past Cultures negative, no new Cx pending *****  - Microbiology and Radiology reviewed   - trend CBC with diff, CMP  and fever curve  - Avoiding antibiotics unless there is a concern for a bacterial/fungal infection    Endo:  - ISS for aggressive glycemic control to limit FS glucose to < 180mg/dl.    Critical Care Time (EXCLUSIVE of any non bundled procedures) : 40 **minutes were spent assessing the patient's presenting problems of acute illness that pose a high probability of life threatening  deterioration or end organ damage / dysfunction.  Medical desicion making includes initiation / continuation of plan or care review data/ labwork/ radiographic study, direct patient care bedside ,  discussions with  consultants regarding care,  evaluation and interpretation of vital signs, any necessary ventilator management,   NIV or BIPAP changes  or initiation,    discussions with multidisipliary team,  am or pm rounds, discussions of goals of care with patient and family all non-inclusive of procedures.    Plan discussed with Dr Ng 72F hx of CVA with dense L hemiparesis since 2003, HTN, HLD, hypothyroid, CKD3, T2DM on insulin, depression admitted for    1. Septic shock 2/2 UTI  2. Anemia  3. MAXIMILIAN on CKD, most likely pre renal  4. Anion Gap Metabolic Acidosis  5. Hematuria  6. DVT    DNR/DNI    Neuro:  - Alert, interactive,  confused how ever able to make needs known  - Avoid Neuro Deliriogenic / sedative medications  - Remeron, Zoloft    CV:  - actively titrating Ryan to maintain MAP >65  - Statin    Pulm:  - Supplemental oxygen as needed to maintain spo2 > 94%  - Incentive spirometry when able                  GI:  - Cont Protonix 40mg Daily,   - Enteral feeds as tolerated to avoid Stress ulceration and optimize nutritional state when indicated    Renal:  - MAXIMILIAN on CKD improving continue to monitor Bun/Cr and UOP  - Replacing electrolytes as needed with Goal K> 4, PO> 3, Mg> 2               - Strict I&O's, rodriguez to BSD  - Avoid Nephro toxic medication  - Renally dose meds  - Hematuria starting to clear  - HCO3 gtt @ 150    Heme:  - No SCDs for DVT/PE ppx   - Hold Chemical DVT prophylaxis  - Trend H/H, Type and screen done, Hematuria clearing and pt received 3L NS. current hgb 7.4 most likely dilutional, will follow up    ID:  - WBC 22.5, remains afebrile with no antipyretics administered   - Continue Cefepime  - Microbiology and Radiology reviewed   - trend CBC with diff, CMP  and fever curve  - Pt Pan Cx'd, Lactate 1.3 s/p 3L NS    Endo:  - ISS for aggressive glycemic control to limit FS glucose to < 180mg/dl.    Critical Care Time (EXCLUSIVE of any non bundled procedures) : 40 **minutes were spent assessing the patient's presenting problems of acute illness that pose a high probability of life threatening  deterioration or end organ damage / dysfunction.  Medical desicion making includes initiation / continuation of plan or care review data/ labwork/ radiographic study, direct patient care bedside ,  discussions with  consultants regarding care,  evaluation and interpretation of vital signs, any necessary ventilator management,   NIV or BIPAP changes  or initiation,    discussions with multidisipliary team,  am or pm rounds, discussions of goals of care with patient and family all non-inclusive of procedures.    Plan discussed with Dr Ng

## 2022-06-23 NOTE — H&P ADULT - ASSESSMENT
72F hx of CVA with dense L hemiparesis since 2003, HTN, HLD, hypothyroid, CKD3, T2DM on insulin, depression presented to the ED from  with abnormal labs, sepsis x 1-2 days, noted to have sepsis secondary to UTI concerning for bacteremia     #Sepsis  #UTI  #Leukocytosis  -Reviewed records from , WBC downtrending  -Start Zosyn BID, renally dosed  -Follow up with urine + Blood cx  -Monitor vitals  -Consider ID consult if no improvement    #MAXIMILIAN on CKD stage IV  -Likely obstructive/prerenal in the setting of urinary retention/sepsis  -Maintain Mckeon catheter  -Monitor renal function  -Avoid nephrotoxic agents    #New DVT at left lower ext  -dopplers: +left common femoral vein acute DVT  -Discussed with pharmacy, okay to continue Eliquis 5mg BID    #DM2  -HgbA1c 6.1  -cont Lantus 6units QHS  -ISS, accuchecks    #Hypotensive in setting of sepsis  -Normotensive s/p IVF     #Hypothyroid  Not currently on meds  Normal TSH    #Depression   cont zoloft    Updated Sister Valentine Neely [phone]

## 2022-06-23 NOTE — ED ADULT NURSE NOTE - OBJECTIVE STATEMENT
pt is alert, and oriented x4, pt states of having generalized body aches, iv placed labs collected, diagnostic tests performed, resp even and unlabored, nad noted, will continue to monitor

## 2022-06-24 DIAGNOSIS — D68.9 COAGULATION DEFECT, UNSPECIFIED: ICD-10-CM

## 2022-06-24 LAB
ALBUMIN SERPL ELPH-MCNC: 1.9 G/DL — LOW (ref 3.3–5)
ALP SERPL-CCNC: 159 U/L — HIGH (ref 40–120)
ALT FLD-CCNC: 27 U/L — SIGNIFICANT CHANGE UP (ref 10–45)
ANION GAP SERPL CALC-SCNC: 12 MMOL/L — SIGNIFICANT CHANGE UP (ref 5–17)
ANION GAP SERPL CALC-SCNC: 15 MMOL/L — SIGNIFICANT CHANGE UP (ref 5–17)
ANION GAP SERPL CALC-SCNC: 16 MMOL/L — SIGNIFICANT CHANGE UP (ref 5–17)
APTT BLD: 91.5 SEC — HIGH (ref 27.5–35.5)
APTT BLD: 98.9 SEC — HIGH (ref 27.5–35.5)
AST SERPL-CCNC: 68 U/L — HIGH (ref 10–40)
BASOPHILS # BLD AUTO: 0.09 K/UL — SIGNIFICANT CHANGE UP (ref 0–0.2)
BASOPHILS NFR BLD AUTO: 0.4 % — SIGNIFICANT CHANGE UP (ref 0–2)
BILIRUB SERPL-MCNC: 0.3 MG/DL — SIGNIFICANT CHANGE UP (ref 0.2–1.2)
BUN SERPL-MCNC: 101 MG/DL — HIGH (ref 7–23)
BUN SERPL-MCNC: 99 MG/DL — HIGH (ref 7–23)
BUN SERPL-MCNC: 99 MG/DL — HIGH (ref 7–23)
CALCIUM SERPL-MCNC: 6.5 MG/DL — CRITICAL LOW (ref 8.4–10.5)
CALCIUM SERPL-MCNC: 6.9 MG/DL — LOW (ref 8.4–10.5)
CALCIUM SERPL-MCNC: 7.1 MG/DL — LOW (ref 8.4–10.5)
CHLORIDE SERPL-SCNC: 104 MMOL/L — SIGNIFICANT CHANGE UP (ref 96–108)
CHLORIDE SERPL-SCNC: 105 MMOL/L — SIGNIFICANT CHANGE UP (ref 96–108)
CHLORIDE SERPL-SCNC: 105 MMOL/L — SIGNIFICANT CHANGE UP (ref 96–108)
CO2 SERPL-SCNC: 18 MMOL/L — LOW (ref 22–31)
CO2 SERPL-SCNC: 20 MMOL/L — LOW (ref 22–31)
CO2 SERPL-SCNC: 25 MMOL/L — SIGNIFICANT CHANGE UP (ref 22–31)
CREAT SERPL-MCNC: 5.45 MG/DL — HIGH (ref 0.5–1.3)
CREAT SERPL-MCNC: 5.57 MG/DL — HIGH (ref 0.5–1.3)
CREAT SERPL-MCNC: 5.78 MG/DL — HIGH (ref 0.5–1.3)
CULTURE RESULTS: SIGNIFICANT CHANGE UP
EGFR: 7 ML/MIN/1.73M2 — LOW
EGFR: 8 ML/MIN/1.73M2 — LOW
EGFR: 8 ML/MIN/1.73M2 — LOW
EOSINOPHIL # BLD AUTO: 0.42 K/UL — SIGNIFICANT CHANGE UP (ref 0–0.5)
EOSINOPHIL NFR BLD AUTO: 1.8 % — SIGNIFICANT CHANGE UP (ref 0–6)
FIBRINOGEN PPP-MCNC: 692 MG/DL — HIGH (ref 340–550)
GLUCOSE BLDC GLUCOMTR-MCNC: 107 MG/DL — HIGH (ref 70–99)
GLUCOSE BLDC GLUCOMTR-MCNC: 114 MG/DL — HIGH (ref 70–99)
GLUCOSE BLDC GLUCOMTR-MCNC: 172 MG/DL — HIGH (ref 70–99)
GLUCOSE BLDC GLUCOMTR-MCNC: 191 MG/DL — HIGH (ref 70–99)
GLUCOSE BLDC GLUCOMTR-MCNC: 193 MG/DL — HIGH (ref 70–99)
GLUCOSE BLDC GLUCOMTR-MCNC: 321 MG/DL — HIGH (ref 70–99)
GLUCOSE SERPL-MCNC: 118 MG/DL — HIGH (ref 70–99)
GLUCOSE SERPL-MCNC: 165 MG/DL — HIGH (ref 70–99)
GLUCOSE SERPL-MCNC: 273 MG/DL — HIGH (ref 70–99)
HCT VFR BLD CALC: 21.1 % — LOW (ref 34.5–45)
HCT VFR BLD CALC: 21.8 % — LOW (ref 34.5–45)
HCT VFR BLD CALC: 22.5 % — LOW (ref 34.5–45)
HCT VFR BLD CALC: 22.9 % — LOW (ref 34.5–45)
HGB BLD-MCNC: 7.3 G/DL — LOW (ref 11.5–15.5)
HGB BLD-MCNC: 7.5 G/DL — LOW (ref 11.5–15.5)
HGB BLD-MCNC: 7.6 G/DL — LOW (ref 11.5–15.5)
HGB BLD-MCNC: 7.7 G/DL — LOW (ref 11.5–15.5)
IMM GRANULOCYTES NFR BLD AUTO: 1.9 % — HIGH (ref 0–1.5)
INR BLD: >14.99 RATIO — CRITICAL HIGH (ref 0.88–1.16)
INR BLD: >15 RATIO (ref 0.88–1.16)
LYMPHOCYTES # BLD AUTO: 1.81 K/UL — SIGNIFICANT CHANGE UP (ref 1–3.3)
LYMPHOCYTES # BLD AUTO: 7.6 % — LOW (ref 13–44)
MAGNESIUM SERPL-MCNC: 1.7 MG/DL — SIGNIFICANT CHANGE UP (ref 1.6–2.6)
MCHC RBC-ENTMCNC: 26.9 PG — LOW (ref 27–34)
MCHC RBC-ENTMCNC: 27 PG — SIGNIFICANT CHANGE UP (ref 27–34)
MCHC RBC-ENTMCNC: 27.1 PG — SIGNIFICANT CHANGE UP (ref 27–34)
MCHC RBC-ENTMCNC: 27.2 PG — SIGNIFICANT CHANGE UP (ref 27–34)
MCHC RBC-ENTMCNC: 33.3 GM/DL — SIGNIFICANT CHANGE UP (ref 32–36)
MCHC RBC-ENTMCNC: 33.6 GM/DL — SIGNIFICANT CHANGE UP (ref 32–36)
MCHC RBC-ENTMCNC: 34.6 GM/DL — SIGNIFICANT CHANGE UP (ref 32–36)
MCHC RBC-ENTMCNC: 34.9 GM/DL — SIGNIFICANT CHANGE UP (ref 32–36)
MCV RBC AUTO: 77.6 FL — LOW (ref 80–100)
MCV RBC AUTO: 78.7 FL — LOW (ref 80–100)
MCV RBC AUTO: 80.1 FL — SIGNIFICANT CHANGE UP (ref 80–100)
MCV RBC AUTO: 81.2 FL — SIGNIFICANT CHANGE UP (ref 80–100)
MONOCYTES # BLD AUTO: 1.08 K/UL — HIGH (ref 0–0.9)
MONOCYTES NFR BLD AUTO: 4.6 % — SIGNIFICANT CHANGE UP (ref 2–14)
NEUTROPHILS # BLD AUTO: 19.87 K/UL — HIGH (ref 1.8–7.4)
NEUTROPHILS NFR BLD AUTO: 83.7 % — HIGH (ref 43–77)
NRBC # BLD: 0 /100 WBCS — SIGNIFICANT CHANGE UP (ref 0–0)
PHOSPHATE SERPL-MCNC: 4 MG/DL — SIGNIFICANT CHANGE UP (ref 2.5–4.5)
PLATELET # BLD AUTO: 258 K/UL — SIGNIFICANT CHANGE UP (ref 150–400)
PLATELET # BLD AUTO: 284 K/UL — SIGNIFICANT CHANGE UP (ref 150–400)
PLATELET # BLD AUTO: 294 K/UL — SIGNIFICANT CHANGE UP (ref 150–400)
PLATELET # BLD AUTO: 307 K/UL — SIGNIFICANT CHANGE UP (ref 150–400)
POTASSIUM SERPL-MCNC: 3.3 MMOL/L — LOW (ref 3.5–5.3)
POTASSIUM SERPL-MCNC: 3.3 MMOL/L — LOW (ref 3.5–5.3)
POTASSIUM SERPL-MCNC: 4.3 MMOL/L — SIGNIFICANT CHANGE UP (ref 3.5–5.3)
POTASSIUM SERPL-SCNC: 3.3 MMOL/L — LOW (ref 3.5–5.3)
POTASSIUM SERPL-SCNC: 3.3 MMOL/L — LOW (ref 3.5–5.3)
POTASSIUM SERPL-SCNC: 4.3 MMOL/L — SIGNIFICANT CHANGE UP (ref 3.5–5.3)
PROT SERPL-MCNC: 5.8 G/DL — LOW (ref 6–8.3)
PROTHROM AB SERPL-ACNC: > 200 SEC (ref 10.5–13.4)
PROTHROM AB SERPL-ACNC: > 200 SEC (ref 10.5–13.4)
RBC # BLD: 2.68 M/UL — LOW (ref 3.8–5.2)
RBC # BLD: 2.77 M/UL — LOW (ref 3.8–5.2)
RBC # BLD: 2.81 M/UL — LOW (ref 3.8–5.2)
RBC # BLD: 2.86 M/UL — LOW (ref 3.8–5.2)
RBC # FLD: 17.1 % — HIGH (ref 10.3–14.5)
RBC # FLD: 17.9 % — HIGH (ref 10.3–14.5)
RBC # FLD: 18 % — HIGH (ref 10.3–14.5)
RBC # FLD: 18.6 % — HIGH (ref 10.3–14.5)
SODIUM SERPL-SCNC: 138 MMOL/L — SIGNIFICANT CHANGE UP (ref 135–145)
SODIUM SERPL-SCNC: 141 MMOL/L — SIGNIFICANT CHANGE UP (ref 135–145)
SODIUM SERPL-SCNC: 141 MMOL/L — SIGNIFICANT CHANGE UP (ref 135–145)
SPECIMEN SOURCE: SIGNIFICANT CHANGE UP
WBC # BLD: 18.89 K/UL — HIGH (ref 3.8–10.5)
WBC # BLD: 21.54 K/UL — HIGH (ref 3.8–10.5)
WBC # BLD: 21.72 K/UL — HIGH (ref 3.8–10.5)
WBC # BLD: 23.73 K/UL — HIGH (ref 3.8–10.5)
WBC # FLD AUTO: 18.89 K/UL — HIGH (ref 3.8–10.5)
WBC # FLD AUTO: 21.54 K/UL — HIGH (ref 3.8–10.5)
WBC # FLD AUTO: 21.72 K/UL — HIGH (ref 3.8–10.5)
WBC # FLD AUTO: 23.73 K/UL — HIGH (ref 3.8–10.5)

## 2022-06-24 PROCEDURE — 99497 ADVNCD CARE PLAN 30 MIN: CPT

## 2022-06-24 PROCEDURE — 99223 1ST HOSP IP/OBS HIGH 75: CPT

## 2022-06-24 RX ORDER — SERTRALINE 25 MG/1
1 TABLET, FILM COATED ORAL
Qty: 0 | Refills: 0 | DISCHARGE

## 2022-06-24 RX ORDER — ACETAMINOPHEN 500 MG
2 TABLET ORAL
Qty: 0 | Refills: 0 | DISCHARGE

## 2022-06-24 RX ORDER — PHYTONADIONE (VIT K1) 5 MG
10 TABLET ORAL ONCE
Refills: 0 | Status: COMPLETED | OUTPATIENT
Start: 2022-06-24 | End: 2022-06-24

## 2022-06-24 RX ORDER — INSULIN GLARGINE 100 [IU]/ML
6 INJECTION, SOLUTION SUBCUTANEOUS
Qty: 0 | Refills: 0 | DISCHARGE

## 2022-06-24 RX ORDER — POTASSIUM CHLORIDE 20 MEQ
40 PACKET (EA) ORAL ONCE
Refills: 0 | Status: DISCONTINUED | OUTPATIENT
Start: 2022-06-24 | End: 2022-06-24

## 2022-06-24 RX ORDER — INSULIN LISPRO 100/ML
VIAL (ML) SUBCUTANEOUS AT BEDTIME
Refills: 0 | Status: DISCONTINUED | OUTPATIENT
Start: 2022-06-24 | End: 2022-07-01

## 2022-06-24 RX ORDER — INSULIN GLARGINE 100 [IU]/ML
8 INJECTION, SOLUTION SUBCUTANEOUS
Qty: 0 | Refills: 0 | DISCHARGE

## 2022-06-24 RX ORDER — MIDODRINE HYDROCHLORIDE 2.5 MG/1
5 TABLET ORAL EVERY 8 HOURS
Refills: 0 | Status: DISCONTINUED | OUTPATIENT
Start: 2022-06-24 | End: 2022-06-26

## 2022-06-24 RX ORDER — LACTOBACILLUS ACIDOPHILUS 100MM CELL
1 CAPSULE ORAL
Qty: 0 | Refills: 0 | DISCHARGE

## 2022-06-24 RX ORDER — LANOLIN ALCOHOL/MO/W.PET/CERES
1 CREAM (GRAM) TOPICAL
Qty: 0 | Refills: 0 | DISCHARGE

## 2022-06-24 RX ORDER — HYDROCORTISONE 1 %
1 OINTMENT (GRAM) TOPICAL
Qty: 0 | Refills: 0 | DISCHARGE

## 2022-06-24 RX ORDER — POTASSIUM CHLORIDE 20 MEQ
10 PACKET (EA) ORAL
Refills: 0 | Status: COMPLETED | OUTPATIENT
Start: 2022-06-24 | End: 2022-06-24

## 2022-06-24 RX ORDER — MIRTAZAPINE 45 MG/1
1 TABLET, ORALLY DISINTEGRATING ORAL
Qty: 0 | Refills: 0 | DISCHARGE

## 2022-06-24 RX ORDER — ALBUMIN HUMAN 25 %
100 VIAL (ML) INTRAVENOUS ONCE
Refills: 0 | Status: COMPLETED | OUTPATIENT
Start: 2022-06-24 | End: 2022-06-24

## 2022-06-24 RX ORDER — DOCUSATE SODIUM 100 MG
2 CAPSULE ORAL
Qty: 0 | Refills: 0 | DISCHARGE

## 2022-06-24 RX ORDER — CALCIUM GLUCONATE 100 MG/ML
1 VIAL (ML) INTRAVENOUS ONCE
Refills: 0 | Status: COMPLETED | OUTPATIENT
Start: 2022-06-24 | End: 2022-06-24

## 2022-06-24 RX ORDER — METFORMIN HYDROCHLORIDE 850 MG/1
1 TABLET ORAL
Qty: 0 | Refills: 0 | DISCHARGE

## 2022-06-24 RX ORDER — SODIUM CHLORIDE 9 MG/ML
1000 INJECTION, SOLUTION INTRAVENOUS
Refills: 0 | Status: DISCONTINUED | OUTPATIENT
Start: 2022-06-24 | End: 2022-06-25

## 2022-06-24 RX ORDER — SENNA PLUS 8.6 MG/1
2 TABLET ORAL
Qty: 0 | Refills: 0 | DISCHARGE

## 2022-06-24 RX ORDER — POTASSIUM CHLORIDE 20 MEQ
10 PACKET (EA) ORAL ONCE
Refills: 0 | Status: COMPLETED | OUTPATIENT
Start: 2022-06-24 | End: 2022-06-24

## 2022-06-24 RX ORDER — PHYTONADIONE (VIT K1) 5 MG
10 TABLET ORAL ONCE
Refills: 0 | Status: DISCONTINUED | OUTPATIENT
Start: 2022-06-24 | End: 2022-06-24

## 2022-06-24 RX ORDER — INSULIN LISPRO 100/ML
VIAL (ML) SUBCUTANEOUS
Refills: 0 | Status: DISCONTINUED | OUTPATIENT
Start: 2022-06-24 | End: 2022-07-01

## 2022-06-24 RX ADMIN — CEFEPIME 100 MILLIGRAM(S): 1 INJECTION, POWDER, FOR SOLUTION INTRAMUSCULAR; INTRAVENOUS at 11:58

## 2022-06-24 RX ADMIN — CHLORHEXIDINE GLUCONATE 1 APPLICATION(S): 213 SOLUTION TOPICAL at 06:12

## 2022-06-24 RX ADMIN — Medication 125 MEQ/KG/HR: at 03:11

## 2022-06-24 RX ADMIN — Medication 0: at 06:13

## 2022-06-24 RX ADMIN — Medication 1: at 17:32

## 2022-06-24 RX ADMIN — Medication 102 MILLIGRAM(S): at 15:43

## 2022-06-24 RX ADMIN — Medication 50 MILLILITER(S): at 09:53

## 2022-06-24 RX ADMIN — MIDODRINE HYDROCHLORIDE 5 MILLIGRAM(S): 2.5 TABLET ORAL at 21:18

## 2022-06-24 RX ADMIN — SODIUM CHLORIDE 100 MILLILITER(S): 9 INJECTION, SOLUTION INTRAVENOUS at 19:50

## 2022-06-24 RX ADMIN — Medication 1: at 15:12

## 2022-06-24 RX ADMIN — TRAMADOL HYDROCHLORIDE 25 MILLIGRAM(S): 50 TABLET ORAL at 15:43

## 2022-06-24 RX ADMIN — Medication 100 MILLIEQUIVALENT(S): at 00:26

## 2022-06-24 RX ADMIN — INSULIN GLARGINE 6 UNIT(S): 100 INJECTION, SOLUTION SUBCUTANEOUS at 22:10

## 2022-06-24 RX ADMIN — Medication 100 GRAM(S): at 02:41

## 2022-06-24 RX ADMIN — SERTRALINE 50 MILLIGRAM(S): 25 TABLET, FILM COATED ORAL at 15:14

## 2022-06-24 RX ADMIN — Medication 100 MILLIEQUIVALENT(S): at 22:06

## 2022-06-24 RX ADMIN — Medication 125 MEQ/KG/HR: at 13:05

## 2022-06-24 RX ADMIN — PANTOPRAZOLE SODIUM 40 MILLIGRAM(S): 20 TABLET, DELAYED RELEASE ORAL at 15:13

## 2022-06-24 RX ADMIN — Medication 100 MILLIEQUIVALENT(S): at 09:26

## 2022-06-24 RX ADMIN — Medication 100 MILLIEQUIVALENT(S): at 23:11

## 2022-06-24 RX ADMIN — MIRTAZAPINE 15 MILLIGRAM(S): 45 TABLET, ORALLY DISINTEGRATING ORAL at 21:18

## 2022-06-24 RX ADMIN — ATORVASTATIN CALCIUM 80 MILLIGRAM(S): 80 TABLET, FILM COATED ORAL at 21:19

## 2022-06-24 RX ADMIN — Medication 100 MILLIEQUIVALENT(S): at 21:12

## 2022-06-24 RX ADMIN — Medication 4: at 00:26

## 2022-06-24 RX ADMIN — TRAMADOL HYDROCHLORIDE 25 MILLIGRAM(S): 50 TABLET ORAL at 16:15

## 2022-06-24 NOTE — DIETITIAN INITIAL EVALUATION ADULT - PERTINENT LABORATORY DATA
06-24    141  |  105  |  101<H>  ----------------------------<  118<H>  3.3<L>   |  20<L>  |  5.57<H>    Ca    7.1<L>      24 Jun 2022 06:10  Phos  5.5     06-23  Mg     1.5     06-23    TPro  5.8<L>  /  Alb  1.5<L>  /  TBili  0.3  /  DBili  x   /  AST  64<H>  /  ALT  23  /  AlkPhos  125<H>  06-23  POCT Blood Glucose.: 114 mg/dL (06-24-22 @ 06:08)  A1C with Estimated Average Glucose Result: 6.1 % (06-12-22 @ 06:25)

## 2022-06-24 NOTE — DIETITIAN NUTRITION RISK NOTIFICATION - TREATMENT: THE FOLLOWING DIET HAS BEEN RECOMMENDED
Diet, Consistent Carbohydrate/No Snacks:   Soft and Bite Sized ([id number])  Supplement Feeding Modality:  Oral  Nepro Cans or Servings Per Day:  1       Frequency:  Three Times a day (06-24-22 @ 08:59) [Pending Verification By Attending]  Diet, Soft and Bite Sized (06-24-22 @ 08:14) [Active]

## 2022-06-24 NOTE — PROGRESS NOTE ADULT - CONVERSATION DETAILS
KIDNEYS/URETERS: No evidence for a calculus in the kidneys or ureters. No hydronephrosis. Bilateral extrarenal pelvises. Apparent 1.7 cm heterogeneous left renal lesion with Hounsfield unit of 33 (image 53   series 2); if clinically indicated, abdominal MR without and with IV contrast may be pursued to rule out a solid malignant neoplasm.

## 2022-06-24 NOTE — PROGRESS NOTE ADULT - SUBJECTIVE AND OBJECTIVE BOX
HPI: 73 y/o F w/ pmh of CVA w/ L. hemiparesis, htn, hld, hypothyroid CKD, Dm2, depression, LLE DVT on Eliquis and Ceftin for possible UTI in SNF, presented to [facility] on 2022 for for abnormal labs, in the ED pt was found to be septic shock requiring fernie, MAXIMILIAN on CKD, UTI w/ hematuria, metabolic acidosis requiring bicarb gtt  and suprathreaputic INR.    24 hour events: Pt now w/ improvement in bicarb will d/c bicarb and start LR at 100cc/hr, noted to have HypoK form afternoon labs will order KCL, remains on low dose of fernie for hypotension. Pt seen and examined currently comfortable in bed w/out any complains.    Review of Systems:  Constitutional: No fever, chills, fatigue  Neuro: No headache, numbness, weakness  Resp: No cough, wheezing, shortness of breath  CVS: No chest pain, palpitations, leg swelling  GI: No abdominal pain, nausea, vomiting, diarrhea   : No dysuria, frequency, incontinence  Skin: No itching, burning, rashes, or lesions   Msk: No joint pain or swelling  Psych: No depression, anxiety, mood swings    T(F): 97.9 (22 @ 15:00), Max: 98.7 (22 @ 07:00)  HR: 91 (22 @ 19:00) (83 - 116)  BP: 105/55 (22 @ 19:00) (48/24 - 125/64)  RR: 21 (22 @ 19:00) (13 - 24)  SpO2: 94% (22 @ 19:00) (92% - 100%)  Wt(kg): --      22 @ 07:01  -  22 @ 07:00  --------------------------------------------------------  IN: 2058.5 mL / OUT: 1125 mL / NET: 933.5 mL    22 @ 07:01  -  22 @ 19:54  --------------------------------------------------------  IN: 1390.8 mL / OUT: 900 mL / NET: 490.8 mL        CAPILLARY BLOOD GLUCOSE      POCT Blood Glucose.: 191 mg/dL (2022 17:25)      I&O's Summary    2022 07:01  -  2022 07:00  --------------------------------------------------------  IN: 2058.5 mL / OUT: 1125 mL / NET: 933.5 mL    2022 07:01  -  2022 19:54  --------------------------------------------------------  IN: 1390.8 mL / OUT: 900 mL / NET: 490.8 mL        Physical Exam:   Gen: Comfortable in bed in NAD  Neuro: awake and alert and following commands  HEENT: PEERL  Resp: good air entry b/l  CVS: +RRR  Abd: BSx4, soft, nt/nd  Ext: no edema   Skin: warm/dry    Meds:  cefepime   IVPB IV Intermittent    midodrine Oral  phenylephrine    Infusion IV Continuous    atorvastatin Oral  dextrose 50% Injectable IV Push  dextrose 50% Injectable IV Push  dextrose 50% Injectable IV Push  insulin glargine Injectable (LANTUS) SubCutaneous  insulin lispro (ADMELOG) corrective regimen sliding scale SubCutaneous  insulin lispro (ADMELOG) corrective regimen sliding scale SubCutaneous      acetaminophen     Tablet .. Oral PRN  mirtazapine Oral  sertraline Oral  traMADol Oral PRN        pantoprazole    Tablet Oral      dextrose 5%. IV Continuous  dextrose 5%. IV Continuous  lactated ringers. IV Continuous  potassium chloride  10 mEq/100 mL IVPB IV Intermittent      chlorhexidine 2% Cloths Topical                            7.3    21.54 )-----------( 258      ( 2022 12:15 )             21.1           141  |  104  |  99<H>  ----------------------------<  165<H>  3.3<L>   |  25  |  5.45<H>    Ca    6.9<L>      2022 12:15  Phos  4.0       Mg     1.7         TPro  5.8<L>  /  Alb  1.9<L>  /  TBili  0.3  /  DBili  x   /  AST  68<H>  /  ALT  27  /  AlkPhos  159<H>        CARDIAC MARKERS ( 2022 12:19 )  x     / x     / 946 U/L / x     / x          PT/INR - ( 2022 06:10 )   PT: > 200 sec;   INR: >14.99 ratio         PTT - ( 2022 06:10 )  PTT:91.5 sec  Urinalysis Basic - ( 2022 15:30 )    Color: Brown / Appearance: very cloudy / S.015 / pH: x  Gluc: x / Ketone: Negative  / Bili: Negative / Urobili: 1   Blood: x / Protein: 500 mg/dL / Nitrite: Positive   Leuk Esterase: Moderate / RBC: >50 /HPF / WBC >50 /HPF   Sq Epi: x / Non Sq Epi: Neg.-Few / Bacteria: Moderate /HPF      .Blood Blood-Peripheral   No growth to date. --  @ 12:21      Rapid RVP Result: NotDetec ( @ 12:21)      Radiology:    < from: CT Abdomen and Pelvis No Cont (22 @ 14:09) >  ACC: [id number] EXAM:  CT ABDOMEN AND PELVIS                          PROCEDURE DATE:  2022          INTERPRETATION:  CLINICAL INFORMATION: Abdominal pain    COMPARISON: None.    CONTRAST/COMPLICATIONS:  IV Contrast: NONE  Oral Contrast: NONE  Complications: None reported at time of study completion    PROCEDURE:  CT of the Abdomen and Pelvis was performed.  Sagittal and coronal reformats were performed.    FINDINGS: The examination is limited by lack of IV/oral contrast.  LOWER CHEST: Small bilateral pleural effusions. Mild bilateral   atelectasis. Prominence of the central pulmonary arteries, suggestive of   pulmonary arterial hypertension.    LIVER: Within normal limits.  BILE DUCTS: Normal caliber.  GALLBLADDER: Within normal limits.  SPLEEN: Within normal limits.  PANCREAS: Within normal limits.  ADRENALS: Nonspecific mild adrenal thickening bilaterally.  KIDNEYS/URETERS: No evidence for a calculus in the kidneys or ureters. No   hydronephrosis. Bilateral extrarenal pelvises. Apparent 1.7 cm   heterogeneous left renal lesion with Hounsfield unit of 33 (image 53   series 2); if clinically indicated, abdominal MR without and with IV   contrast may be pursued to rule out a solid malignant neoplasm.    BLADDER: Hatfield catheter in the urinary bladder which is collapsed.  REPRODUCTIVE ORGANS: The uterus is not visualized, probably surgically   absent.    BOWEL: No bowel obstruction or grossly thickened bowel wall. Colonic   diverticulosis without evidence of diverticulitis. Appendix within normal   limits. Moderate amount of stool in the rectum.  PERITONEUM: No free air or ascites.  VESSELS: Calcified atherosclerotic disease.  RETROPERITONEUM/LYMPH NODES: No lymphadenopathy.  ABDOMINAL WALL: Small fat-containing umbilical hernia.  BONES: Mild degenerative spondylosis.    IMPRESSION:  No bowel obstruction or grossly thickened bowel wall. Colonic   diverticulosis without evidence of diverticulitis. Appendix within normal   limits. Moderate amount of stool in the rectum.    Small bilateral pleural effusions    Apparent 1.7 cm heterogeneous left renal lesion with Hounsfield unit of   33; if clinically indicated, abdominal MR without and with IV contrast   may be pursued to rule out a solid malignant neoplasm.    --- End of Report ---      JUAN CASTANEDA MD; Attending Radiologist  This document has been electronically signed. 2022  2:50PM    < end of copied text >      CENTRAL LINE: N  HATFIELD: Y  A-LINE: N    GLOBAL ISSUE/BEST PRACTICE:  Analgesia: Y  Sedation: N  CAM-ICU: n/a  HOB elevation: Y  Stress ulcer prophylaxis: Y  VTE prophylaxis: N   Glycemic control: Y  Nutrition: Y    CODE STATUS: DNR/DNI    CRITICAL CARE TIME SPENT: 38 mins  (Assessing presenting problems of acute illness, which pose high probability of life threatening deterioration or end organ damage/dysfunction, as well as medical decision making including initiating plan of care, reviewing data, reviewing radiologic exams, discussing with multidisciplinary team,  discussing goals of care with patient/family, and writing this note.  Non-inclusive of procedures performed)

## 2022-06-24 NOTE — DIETITIAN INITIAL EVALUATION ADULT - ORAL INTAKE PTA/DIET HISTORY
Patient on previous admission was receiving a Consistent Carbohydrate diet , soft bite size with Nepro po supplements & was noted consuming between 50-75% of meals . Patient at present is not receptive to diet consistency downgrade.

## 2022-06-24 NOTE — PROVIDER CONTACT NOTE (CRITICAL VALUE NOTIFICATION) - ACTION/TREATMENT ORDERED:
Eliquis discontinued Eliquis discontinued.  Vitamin K ordered Eliquis discontinued.  Repeat PT INR stat

## 2022-06-24 NOTE — PROGRESS NOTE ADULT - ASSESSMENT
71 y/o F w/ pmh of CVA w/ L. hemiparesis, htn, hld, hypothyroid CKD, Dm2, depression, LLE DVT on Eliquis and Ceftin for possible UTI in SNF, presented to [facility] on 06/23/2022 for for abnormal labs, in the ED pt was found to be septic shock requiring fernie, MAXIMILIAN on CKD, UTI w/ hematuria, metabolic acidosis requiring bicarb gtt  and suprathreaputic INR.      -Neuro: no acute issues, depression on remeron and zoloft  -Cardaic: Septic shock now gradually improving on low dose fernie, will add midodrine 5mg po q8h to mary off pressors, will titrate as needed to maintain MAP >65  -Resp: No acute issues  -GI: Cont diet as ordered/tolerated, GI ppx w/ protonix, hld on statins  -Renal: MAXIMILIAN on CKD w/ metabolic acidosis now improving d/c bicarb gtt, start LR at 100cc/hr, monitor renal function and lytes closely, hypoK ordered KCL for repletion  -[facility] on IV cefepime f/u on final blood cx data  -Endo: DM on ISS  -Heme: Supratherputic INR holding AC cont to trend coags and CBC  -Dispo: Pt is DNR/DNI, GOC per day team, dispo pending hospital

## 2022-06-24 NOTE — CONSULT NOTE ADULT - PROBLEM SELECTOR RECOMMENDATION 9
Patient presenting with all signs/symptoms of sepsis including hypotension, tachycardia, fever and leukocytosis, likely from UTI origin.  Frequently laboratory features associated with abnormalities due to the underlying cause of sepsis, or to tissue hypoperfusion or organ dysfunction include coagulation abnormalities, with INR> 1.5.  In this particular case, patient is on an anticoagulant, which may further cause abnormalities in coagulation parameters.    Will check LDH,plasma fibrinogen and D-dimers (of note D-dimers on 6/11/2022 at 1611).  Treatment of underlying disease will cause normalization of both CBC and coagulation parameters.

## 2022-06-24 NOTE — GOALS OF CARE CONVERSATION - ADVANCED CARE PLANNING - CONVERSATION DETAILS
Patient is lethargic but clearly knows who and where she is.  We reviewed MOLST from 2021 and she agrees with dnr/i.  Case reviewed with HCP Jalyn Bingham who agrees that this is what the patient wants.

## 2022-06-24 NOTE — CHART NOTE - NSCHARTNOTEFT_GEN_A_CORE
Call rom RN INR > 15, PT > 200. Pt hgb stable at 7.5, with resolving hematuria from admission. Hold Eliquis and treat conservatively.    Spoke to EICU Dr Lo Call rom RN INR > 15, PT > 200. Pt hgb stable at 7.5, with resolving hematuria from admission. Hold Eliquis and treat conservatively. Will repeat CBC and coags    Spoke to EICU Dr Lo

## 2022-06-24 NOTE — DIETITIAN INITIAL EVALUATION ADULT - PERTINENT MEDS FT
MEDICATIONS  (STANDING):  albumin human 25% IVPB 100 milliLiter(s) IV Intermittent once  atorvastatin 80 milliGRAM(s) Oral at bedtime  cefepime   IVPB 500 milliGRAM(s) IV Intermittent daily  chlorhexidine 2% Cloths 1 Application(s) Topical <User Schedule>  dextrose 5%. 1000 milliLiter(s) (100 mL/Hr) IV Continuous <Continuous>  dextrose 5%. 1000 milliLiter(s) (50 mL/Hr) IV Continuous <Continuous>  dextrose 50% Injectable 25 Gram(s) IV Push once  dextrose 50% Injectable 12.5 Gram(s) IV Push once  dextrose 50% Injectable 25 Gram(s) IV Push once  insulin glargine Injectable (LANTUS) 6 Unit(s) SubCutaneous at bedtime  insulin lispro (ADMELOG) corrective regimen sliding scale   SubCutaneous at bedtime  insulin lispro (ADMELOG) corrective regimen sliding scale   SubCutaneous three times a day before meals  mirtazapine 15 milliGRAM(s) Oral at bedtime  pantoprazole    Tablet 40 milliGRAM(s) Oral daily  phenylephrine    Infusion 0.1 MICROgram(s)/kG/Min (2.66 mL/Hr) IV Continuous <Continuous>  potassium chloride  10 mEq/100 mL IVPB 10 milliEquivalent(s) IV Intermittent once  sertraline 50 milliGRAM(s) Oral daily  sodium bicarbonate  Infusion 0.265 mEq/kG/Hr (125 mL/Hr) IV Continuous <Continuous>    MEDICATIONS  (PRN):  acetaminophen     Tablet .. 650 milliGRAM(s) Oral every 6 hours PRN Temp greater or equal to 38C (100.4F), Mild Pain (1 - 3)  traMADol 25 milliGRAM(s) Oral every 12 hours PRN Moderate Pain (4 - 6)

## 2022-06-24 NOTE — PROVIDER CONTACT NOTE (CRITICAL VALUE NOTIFICATION) - SITUATION
Summary of Care

 Created on:2019



Patient:Silvia Stafford

Sex:Female

:1992

External Reference #:[id number]





Demographics







 Address  [address]



   [place] [address]

 

 Home Phone  [phone]

 

 Work Phone  [phone]

 

 Email Address  [email or web address]

 

 Preferred Language  English

 

 Marital Status  Not  or 

 

 Anabaptist Affiliation  Unknown

 

 Race  White

 

 Ethnic Group  Not  or 









Author







 Organization  [facility]

 

 Address  [address]



   [place] [address]









Support







 Name  Relationship  Address  Phone

 

 Lee Stafford  Unavailable  Unavailable  [phone]

 

 Lee Stafford  Unavailable  Unavailable  [phone]









Care Team Providers







 Name  Role  Phone

 

 Minal Mcmahan PA-C  Primary Care Provider  [phone]









[facility]



  



     [address]



  [place] [address]



  



     [place] [address] [phone] (



-Kellie Palomares N.P.

[phone],  

Kellie Palomares NP - 2019  8:00 AM ESTFormatting of this note 
might be different from the original.

PATIENT:  Silvia Stafford

MRN:  [id number]

:  1992

DATE OF SERVICE:  2019



REFERRING PRACTITIONER:  Kellie Palomares

PRIMARY CARE PROVIDER:  Minal Mcmahan



CHIEF COMPLAINT:

Chief Complaint

Patient presents with

 Follow-up

  Follow-up on C. Diff.



Subjective

HISTORY OF PRESENT ILLNESS:

Silvia Stafford 

[place] 6-7.

Denies abdominal pain, nausea, vomiting, melena, hamatemesis, hematochezia, 
constipation, diarrhea, fevers, chills, night sweats, weight loss.





Current Outpatient Medications

Medication Sig

 acyclovir (ZOVIRAX) 400 MG Oral Tab Take 400 mg by mouth DAILY.

 Calcium Carb-Cholecalciferol (CALCIUM + D3 PO) Take 500 mg by mouth 
TWICE DAILY.

 Probiotic Product (PROBIOTIC DAILY PO) Take  by mouth.

-Kellie Palomares N.P.

[phone],  
Kellie Palomares 

 

 Silvia Stafford  Personal/Family    1992  [phone] [address]        (Home)



  [address]







         [phone]  [place]



         (Work)  [id number]



documented as of this encounter Calcium 6.5

## 2022-06-24 NOTE — DIETITIAN INITIAL EVALUATION ADULT - OTHER INFO
72F hx of CVA with dense L hemiparesis since 2003, HTN, HLD, hypothyroid, CKD3, T2DM on insulin, depression presented to the ED from  with abnormal labs, admitted with urosepsis , Renal , Urology & ID consults pending . Appetite as per patient is poor due to not feeling well , consumed ~ 25% of breakfast meal. POCT noted . Patient reports a 40lb weight loss over past few months UBW was reported ~ 160lbs

## 2022-06-24 NOTE — PROGRESS NOTE ADULT - SUBJECTIVE AND OBJECTIVE BOX
Addendum to H&P/ICU Consult note  - Patient seen and examined today    ICU CONSULT  Location of Patient :  [facility] [id number] ( [facility])  Attending requesting Consult:Joseph Ng  Chief Complaint :   abdominal pain  Reason For consult : Sepsis      Initial HPI on admission:    72 year old female with h/o CVA with Left sided HP since , HTN, High chol, CKD, Hypothyroid, DM2 on insulin with recent admission for Left Lower Extremity dvt, UTI with possible urinary obsrtruction who was sent home with Eliquis and recieved antibiotics.   She returns from [facility] to have abdominal pain and discomfort, screaming in pain. Her provider requested pt to be transferred to the ED for higher level of care.  In the ED pt hypotensive, improved s/p IVF. Minimally answering questions, screaming in pain.    Brought to ICU and required pressors to maintain hypotension and bicarb drip for acidosis    This am feeling better  no cp, sob, palp, n/v  feeling better  complain of ear pain from n/c and removed    PAST MEDICAL & SURGICAL HISTORY:  CVA (cerebrovascular accident)  Hyperlipidemia  Stage 1 chronic kidney disease  Hypothyroidism  Hemiplegia due to infarction of brain  History of hysterectomy        Allergies    No Known Allergies    Intolerances      FAMILY HISTORY:  FH: CAD (coronary artery disease)  Family history of CVA  FH: diabetes mellitus      Social history:   Former smoker  Social EtOH use  Was discharged to  for flaquita (2022 15:59)         Review of Systems: as stated above, patient poor historian         Medications:  MEDICATIONS  (STANDING):  albumin human 25% IVPB 100 milliLiter(s) IV Intermittent once  atorvastatin 80 milliGRAM(s) Oral at bedtime  cefepime   IVPB 500 milliGRAM(s) IV Intermittent daily  chlorhexidine 2% Cloths 1 Application(s) Topical <User Schedule>  dextrose 5%. 1000 milliLiter(s) (100 mL/Hr) IV Continuous <Continuous>  dextrose 5%. 1000 milliLiter(s) (50 mL/Hr) IV Continuous <Continuous>  dextrose 50% Injectable 25 Gram(s) IV Push once  dextrose 50% Injectable 12.5 Gram(s) IV Push once  dextrose 50% Injectable 25 Gram(s) IV Push once  insulin glargine Injectable (LANTUS) 6 Unit(s) SubCutaneous at bedtime  insulin lispro (ADMELOG) corrective regimen sliding scale   SubCutaneous at bedtime  insulin lispro (ADMELOG) corrective regimen sliding scale   SubCutaneous three times a day before meals  mirtazapine 15 milliGRAM(s) Oral at bedtime  pantoprazole    Tablet 40 milliGRAM(s) Oral daily  phenylephrine    Infusion 0.1 MICROgram(s)/kG/Min (2.66 mL/Hr) IV Continuous <Continuous>  sertraline 50 milliGRAM(s) Oral daily  sodium bicarbonate  Infusion 0.265 mEq/kG/Hr (125 mL/Hr) IV Continuous <Continuous>    MEDICATIONS  (PRN):  acetaminophen     Tablet .. 650 milliGRAM(s) Oral every 6 hours PRN Temp greater or equal to 38C (100.4F), Mild Pain (1 - 3)  traMADol 25 milliGRAM(s) Oral every 12 hours PRN Moderate Pain (4 - 6)      Antibiotics History  cefepime   IVPB 500 milliGRAM(s) IV Intermittent daily, 22 @ 00:00  cefepime   IVPB 2000 milliGRAM(s) IV Intermittent once, 22 @ 11:48, Stop order after: 1 Doses  cefepime   IVPB    , 22 @ 18:23  piperacillin/tazobactam IVPB.. 3.375 Gram(s) IV Intermittent every 12 hours, 22 @ 15:57, Stop order after: 10 Days  vancomycin  IVPB 1000 milliGRAM(s) IV Intermittent Once, 22 @ 11:48, Stop order after: 1 Doses       GI Medications  pantoprazole    Tablet 40 milliGRAM(s) Oral daily, 22 @ 18:35, Routine       Home Medications:  Last Order Reconciliation Date: 22 (Admission Reconciliation)  acetaminophen 325 mg oral tablet: 2 tab(s) orally every 8 hours, As Needed (22)  Anusol-HC 2.5% rectal cream with applicator: 1 application rectal once a day (at bedtime) (22)  apixaban 5 mg oral tablet: 1 tab(s) orally 2 times a day (22)  Artificial Tears ophthalmic solution: 1 drop(s) to each affected eye 2 times a day (22)  atorvastatin 80 mg oral tablet: 1 tab(s) orally once a day (at bedtime) (22)  cefuroxime 250 mg oral tablet: 1 tab(s) orally every 12 hours until  (22)  Colace 100 mg oral capsule: 2 cap(s) orally once a day (at bedtime) (22)  insulin glargine 100 units/mL subcutaneous solution: 6 unit(s) subcutaneous once a day (at bedtime) (22)  lactobacillus acidophilus oral tablet: 1 tab(s) orally once a day (22)  Melatonin 3 mg oral tablet: 1 tab(s) orally once a day (at bedtime) (22)  mirtazapine 15 mg oral tablet: 1 tab(s) orally once a day (at bedtime) (22)  Multiple Vitamins oral tablet: 1 tab(s) orally once a day (22)  pantoprazole 40 mg oral delayed release tablet: 1 tab(s) orally once a day (before a meal) (22)  Senna 8.6 mg oral tablet: 2 tab(s) orally once a day (at bedtime), As Needed (22)  sertraline 50 mg oral tablet: 1 tab(s) orally once a day (22)  traMADol 50 mg oral tablet: 0.5 tab(s) orally every 6 hours, As needed, Moderate Pain (4 - 6) (22)        LABS:                        7.7    23.73 )-----------( 307      ( 2022 06:10 )             22.9         141  |  105  |  101<H>  ----------------------------<  118<H>  3.3<L>   |  20<L>  |  5.57<H>    Ca    7.1<L>      2022 06:10  Phos  5.5       Mg     1.5         TPro  5.8<L>  /  Alb  1.5<L>  /  TBili  0.3  /  DBili  x   /  AST  64<H>  /  ALT  23  /  AlkPhos  125<H>        CARDIAC MARKERS ( 2022 12:19 )  x     / x     / 946 U/L / x     / x        PT/INR - ( 2022 01:20 )   PT: > 200 sec;   INR: >15 ratio    PTT - ( 2022 06:10 )  PTT:91.5 sec Urinalysis Basic - ( 2022 15:30 )    Color: Brown / Appearance: very cloudy / S.015 / pH: x Gluc: x / Ketone: Negative  / Bili: Negative / Urobili: 1 Blood: x / Protein: 500 mg/dL / Nitrite: Positive Leuk Esterase: Moderate / RBC: >50 /HPF / WBC >50 /HPF Sq Epi: x / Non Sq Epi: Neg.-Few / Bacteria: Moderate /HPF     COVID  22 @ 12:21  COVID -   NotDetec  22 @ 18:10  [facility]      COVID Biomarkers    22 @ 18:10 ESR --  ---  CRP --  ---  DDimer  1611<H>   ---   LDH --   ---   Ferritin --       Trend Cardiac Enzymes  22 @ 12:19  [id number]/Trop I - 946<H> - --  - --  -  --  /  --       Procalcitonin Trend  22 @ 18:10   -   0.09<H>    WBC Trend  22 @ 06:10   -  23.73<H>  22 @ 01:20   -  21.72<H>  22 @ 20:40   -  22.57<H>  22 @ 12:19   -  30.03<H>    H/H Trend  22 @ 06:10   -   7.7<L>/ 22.9<L>  22 @ 01:20   -   7.5<L>/ 22.5<L>  22 @ 20:40   -   7.4<L>/ 22.4<L>  22 @ 12:19   -   8.0<L>/ 24.3<L>  22 @ 06:25   -   9.4<L>/ 29.3<L>  22 @ 07:50   -   9.5<L>/ 30.4<L>    Stool Occult Blood    Platelet Trend  22 @ 06:10   -  307  22 @ 01:20   -  294  22 @ 20:40   -  310  22 @ 12:19   -  355    Trend Sodium  22 @ 06:10   -  141  22 @ 01:20   -  138  22 @ 20:40   -  139  22 @ 12:19   -  139    Trend Potassium  22 @ 06:10   -  3.3<L>  22 @ 01:20   -  4.3  22 @ 20:40   -  3.1<L>  22 @ 12:19   -  3.7    Trend Bun/Cr  22 @ 06:10  BUN/CR -  101<H> / 5.57<H>  22 @ 01:20  BUN/CR -  99<H> / 5.78<H>  22 @ 20:40  BUN/CR -  101<H> / 5.66<H>  22 @ 12:19  BUN/CR -  116<H> / 6.91<H>  22 @ 06:25  BUN/CR -  39<H> / 2.20<H>  22 @ 07:50  BUN/CR -  39<H> / 2.32<H>  06-15-22 @ 06:50  BUN/CR -  38<H> / 2.52<H>  22 @ 06:20  BUN/CR -  41<H> / 2.97<H>  22 @ 06:44  BUN/CR -  47<H> / 3.46<H>  22 @ 06:25  BUN/CR -  50<H> / 3.70<H>  22 @ 20:00  BUN/CR -  51<H> / 3.67<H>  22 @ 18:10  BUN/CR -  48<H> / 3.66<H>    Lactic Acid Trend  22 @ 12:19   -   1.3    ABG Trend    Trend AST/ALT/ALK Phos/Bili  22 @ 20:40   64<H>/23/125<H>/0.3  22 @ 12:19   81<H>/32/126<H>/0.4  22 @ 06:25   17/9<L>/75/0.3  22 @ 18:10   31/18/76/0.3       Albumin Trend  22 @ 20:40   -   1.5<L>  22 @ 12:19   -   2.0<L>  22 @ 06:25   -   3.0<L>  22 @ 18:10   -   2.8<L>      PTT - PT - INR Trend  22 @ 06:10   -   91.5<H> - -- - --  22 @ 01:20   -   98.9<H> - > 200<!> - >15<!!>  22 @ 06:25   -   49.0<H> - 53.7<H> - 4.56<H>  22 @ 07:50   -   89.5<H> - 16.5<H> - 1.42<H>  06-15-22 @ 06:53   -   -- - 12.8 - 1.10  06-15-22 @ 00:20   -   74.4<H> - -- - --    Glucose Trend  22 @ 06:10   -  -- 118<H> --  22 @ 06:08   -  -- -- 114<H>  22 @ 01:20   -  -- 273<H> --  22 @ 00:16   -  -- -- 321<H>  22 @ 22:37   -  -- -- 274<H>  22 @ 20:40   -  -- 310<H> --  22 @ 12:19   -  -- 103<H> --    A1C with Estimated Average Glucose Result: 6.1 % *H* [4.0 - 5.6] (22 @ 06:25)        CULTURES: (if applicable)    Culture - Urine (collected 22 @ 02:00)  Source: Clean Catch Clean Catch (Midstream)  Final Report (22 @ 17:41):    >100,000 CFU/ml Escherichia coli  Organism: Escherichia coli (22 @ 17:41)  Organism: Escherichia coli (22 @ 17:41)      -  Amikacin: S <=16      -  Amoxicillin/Clavulanic Acid: S <=8/4      -  Ampicillin: S <=8 These ampicillin results predict results for amoxicillin      -  Ampicillin/Sulbactam: S <=4/2 Enterobacter, Klebsiella aerogenes, Citrobacter, and Serratia may develop resistance during prolonged therapy (3-4 days)      -  Aztreonam: S <=4      -  Cefazolin: S <=2 ([id number]) For uncomplicated UTI with K. pneumoniae, E. coli, or P. mirablis: LIONEL <=16 is sensitive and LIONEL >=32 is resistant. This also predicts results for oral agents cefaclor, cefdinir, cefpodoxime, cefprozil, cefuroxime axetil, cephalexin and locarbef for uncomplicated UTI. Note that some isolates may be susceptible to these agents while testing resistant to cefazolin.      -  Cefepime: S <=2      -  Cefoxitin: S <=8      -  Ceftriaxone: S <=1 Enterobacter, Klebsiella aerogenes, Citrobacter, and Serratia may develop resistance during prolonged therapy      -  Ciprofloxacin: S <=0.25      -  Ertapenem: S <=0.5      -  Gentamicin: S <=2      -  Imipenem: S <=1      -  Levofloxacin: S <=0.5      -  Meropenem: S <=1      -  Nitrofurantoin: S <=32 Should not be used to treat pyelonephritis      -  Piperacillin/Tazobactam: S <=8      -  Tigecycline: S <=2      -  Tobramycin: S <=2      -  Trimethoprim/Sulfamethoxazole: S <=0.5/9.5      Method Type: LIONEL      Rapid RVP Result: NotDetec (22 @ 12:21)        RADIOLOGY  < from: CT Abdomen and Pelvis No Cont (22 @ 14:09) >    ACC: [id number] EXAM:  CT ABDOMEN AND PELVIS                       PROCEDURE DATE:  2022    INTERPRETATION:  CLINICAL INFORMATION: Abdominal pain  COMPARISON: None.  CONTRAST/COMPLICATIONS:  IV Contrast: NONE  Oral Contrast: NONE  Complications: None reported at time of study completion    PROCEDURE:  CT of the Abdomen and Pelvis was performed. Sagittal and coronal reformats were performed.    FINDINGS: The examination is limited by lack of IV/oral contrast.   LOWER CHEST: Small bilateral pleural effusions. Mild bilateral  atelectasis. Prominence of the central pulmonary arteries, suggestive of pulmonary arterial hypertension.  LIVER: Within normal limits.  BILE DUCTS: Normal caliber.  GALLBLADDER: Within normal limits.  SPLEEN: Within normal limits.  PANCREAS: Within normal limits.  ADRENALS: Nonspecific mild adrenal thickening bilaterally.  KIDNEYS/URETERS: No evidence for a calculus in the kidneys or ureters. No hydronephrosis. Bilateral extrarenal pelvises. Apparent 1.7 cm heterogeneous left renal lesion with Hounsfield unit of 33 (image 53   series 2); if clinically indicated, abdominal MR without and with IV contrast may be pursued to rule out a solid malignant neoplasm.    BLADDER: Mckeon catheter in the urinary bladder which is collapsed.   REPRODUCTIVE ORGANS: The uterus is not visualized, probably surgically absent.    BOWEL: No bowel obstruction or grossly thickened bowel wall. Colonic diverticulosis without evidence of diverticulitis. Appendix within normal limits. Moderate amount of stool in the rectum.  PERITONEUM: No free air or ascites.  VESSELS: Calcified atherosclerotic disease.  RETROPERITONEUM/LYMPH NODES: No lymphadenopathy.  ABDOMINAL WALL: Small fat-containing umbilical hernia.  BONES: Mild degenerative spondylosis.    IMPRESSION:  No bowel obstruction or grossly thickened bowel wall. Colonic diverticulosis without evidence of diverticulitis. Appendix within normal limits. Moderate amount of stool in the rectum.  Small bilateral pleural effusions  Apparent 1.7 cm heterogeneous left renal lesion with Hounsfield unit of 33; if clinically indicated, abdominal MR without and with IV contrast may be pursued to rule out a solid malignant neoplasm.    < end of copied text >    ECHO:  < from: TTE Echo Complete w/o Contrast w/ Doppler (22 @ 08:14) >  Summary:   1. Left ventricular ejection fraction, by visual estimation, is 50 to 55%.   2.Normal global left ventricular systolic function.   3. Spectral Doppler shows impaired relaxation pattern of left ventricular myocardial filling (Grade I diastolic dysfunction).   4. There is moderate concentric left ventricular hypertrophy.   5. Normal left atrial size.   6. Normal right atrial size.   7. Mild mitral valve regurgitation.   8. Thickening of the anterior and posterior mitral valve leaflets.   9. Mild tricuspid regurgitation.  10. Mild aortic regurgitation.  < end of copied text >    US  < from: US Renal (22 @ 12:55) >  IMPRESSION:  Prominent renal pelvis and fullness of the collecting systems   bilaterally, right more than left.    < end of copied text >  < from: US Duplex Venous Lower Ext Ltd, Left (22 @ 13:24) >  IMPRESSION:  Left common femoral vein acute deep vein thrombosis. Acute deep venous thrombosis: above the knee.  < end of copied text >    VITALS:  T(C): 36.4 (22 @ 05:00), Max: 37.1 (22 @ 12:03)  T(F): 97.6 (22 @ 05:00), Max: 98.8 (22 @ 12:03)  HR: 107 (22 06:15) (83 - 121)  BP: 110/66 (22 @ 06:15) (65/49 - 119/90)  BP(mean): 78 (22 @ 06:15) (49 - 96)  ABP: --  ABP(mean): --  RR: 16 (22 @ 06:15) (13 - 23)  SpO2: 100% (22 @ 06:15) (70% - 100%)  CVP(mm Hg): --  CVP(cm H2O): --    Ins and Outs     22 @ 07:01  -  22 @ 07:00  --------------------------------------------------------  IN: 2058.5 mL / OUT: 1125 mL / NET: 933.5 mL        Height (cm): 157.5 (22 @ 19:30)  Weight (kg): 61.1 (22 @ 19:30)  BMI (kg/m2): 24.6 (22 @ 19:30)        I&O's Detail    2022 07:01  -  2022 07:00  --------------------------------------------------------  IN:    IV PiggyBack: 100 mL    IV PiggyBack: 300 mL    IV PiggyBack: 50 mL    Phenylephrine: 108.5 mL    Sodium Bicarbonate: 1500 mL  Total IN: 2058.5 mL    OUT:    Indwelling Catheter - Urethral (mL): 1125 mL  Total OUT: 1125 mL    Total NET: 933.5 mL

## 2022-06-24 NOTE — PROGRESS NOTE ADULT - ASSESSMENT
Physical Examination:  GENERAL:               Alert, Oriented, No acute distress.    HEENT:                    No JVD, Dry MM  PULM:                     Bilateral air entry, Clear to auscultation bilaterally, no significant sputum production, No Rales, No Rhonchi, No Wheezing  CVS:                         S1, S2,  +Murmur  ABD:                        Soft, nondistended, nontender, normoactive bowel sounds,   EXT:                         mild edema, nontender, No Cyanosis or Clubbing   Vascular:                Warm Extremities, Normal Capillary refill, Normal Distal Pulses  SKIN:                       Warm and well perfused, no rashes noted.   NEURO:                  Alert, oriented, interactive, LHP , follows commands  PSYC:                      Calm, Limited  Insight.    Assessment  Septic shock with UTI    possible urinary retention  MAXIMILIAN On CKD with baseline around Cr 2.5  DVT   Elevated INR     Underlying Dementia, Depression, DM2 on insulin, h/o CVA with Left HP, CKD          Plan    Pressors to maintain BP titrate to maintain MAP > 65  Empiric antibiotics  ID eval called  Renal consult called  Urology called  heme onc called  hold Eliquis due to hematuria and elevated INR however INR elevation not related to eliquis use, will hold due to hematuria and maximilian/ckd   Bicarb drip , trend BMP and transition to LR  Albumin x 1   f/u cultures    noted MOLST not fully completed with out MD signature    will get palliative to d/w pt and family to refill       PMD:				                   Notified(Date):  Family Updated: 	Jalyn [phone]	                                 Date: 6/24/22      Sedation & Analgesia:	  Diet/Nutrition:		   Diet, Consistent Carbohydrate/No Snacks:   Soft and Bite Sized (SOFTBTSZ)  Supplement Feeding Modality:  Oral  Nepro Cans or Servings Per Day:  1       Frequency:  Three Times a day (06-24-22 @ 08:59) [Pending Verification By Attending]        GI PPx:			pantoprazole    Tablet 40 milliGRAM(s) Oral daily    DVT Ppx:		On hold    	RISK                                                          Points  	[ x] Previous VTE                                           	3  	[ ] Thrombophilia                                        	2  	[ x] Lower limb paralysis                              	2   	[ ] Current Cancer                                       	2   	[ ] Immobilization > 24 hrs                        	1  	[ ] ICU/CCU stay > 24 hours                       	1  	[x ] Age > 60                                                   	1  		Total:[6 ]      Activity:		    Head of Bed:               35-45 Deg  Glycemic Control:          albumin human 25% IVPB 100 milliLiter(s) IV Intermittent once  atorvastatin 80 milliGRAM(s) Oral at bedtime  dextrose 5%. 1000 milliLiter(s) IV Continuous <Continuous>  dextrose 5%. 1000 milliLiter(s) IV Continuous <Continuous>  dextrose 50% Injectable 25 Gram(s) IV Push once  dextrose 50% Injectable 12.5 Gram(s) IV Push once  dextrose 50% Injectable 25 Gram(s) IV Push once  insulin glargine Injectable (LANTUS) 6 Unit(s) SubCutaneous at bedtime  insulin lispro (ADMELOG) corrective regimen sliding scale   SubCutaneous at bedtime  insulin lispro (ADMELOG) corrective regimen sliding scale   SubCutaneous three times a day before meals  sodium bicarbonate  Infusion 0.265 mEq/kG/Hr IV Continuous <Continuous>      Lines: PIV  CENTRAL LINE: 	[ ] YES [ ] NO	                    LOCATION:   	                       DATE INSERTED:   	                    REMOVE:  [ ] YES [ ] NO    A-LINE:  	                [ ] YES [ ] NO                      LOCATION:   	                       DATE INSERTED: 		            REMOVE:  [ ] YES [ ] NO    HATFIELD: 		        [ x] YES [ ] NO  		                                       DATE INSERTED:		            REMOVE:  [ ] YES [ x] NO   // suspect retention    Restraints were deemed necessary to prevent removal of life-sustaining devices [  ] YES   [  x  ]  NO    Disposition: ICU care    Goals of Care:  Physical Examination:  GENERAL:               Alert, Oriented, No acute distress.    HEENT:                    No JVD, Dry MM  PULM:                     Bilateral air entry, Clear to auscultation bilaterally, no significant sputum production, No Rales, No Rhonchi, No Wheezing  CVS:                         S1, S2,  +Murmur  ABD:                        Soft, nondistended, nontender, normoactive bowel sounds,   EXT:                         mild edema, nontender, No Cyanosis or Clubbing   Vascular:                Warm Extremities, Normal Capillary refill, Normal Distal Pulses  SKIN:                       Warm and well perfused, no rashes noted.   NEURO:                  Alert, oriented, interactive, LHP , follows commands  PSYC:                      Calm, Limited  Insight.    Assessment  Septic shock with UTI    possible urinary retention  MAXIMILIAN On CKD with baseline around Cr 2.5  DVT   Elevated INR   Abnormal CT: No evidence for a calculus in the kidneys or ureters. No hydronephrosis. Bilateral extrarenal pelvises. Apparent 1.7 cm heterogeneous left renal lesion with Hounsfield unit of 33 (image 53 series 2); if clinically indicated, abdominal MR without and with IV contrast may be pursued to rule out a solid malignant neoplasm.  Underlying Dementia, Depression, DM2 on insulin, h/o CVA with Left HP, CKD          Plan    Pressors to maintain BP titrate to maintain MAP > 65  Empiric antibiotics  ID eval called  Renal consult called  Urology called  heme onc called  hold Eliquis due to hematuria and elevated INR however INR elevation not related to eliquis use, will hold due to hematuria and maximilian/ckd   Bicarb drip , trend BMP and transition to LR  Albumin x 1   f/u cultures  may need MRI when stable to evaluate  1.7 cm heterogeneous left renal lesion   noted MOLST not fully completed with out MD signature    will get palliative to d/w pt and family to refill       PMD:				                   Notified(Date):  Family Updated: 	Jalyn [phone]	                                 Date: 6/24/22      Sedation & Analgesia:	  Diet/Nutrition:		   Diet, Consistent Carbohydrate/No Snacks:   Soft and Bite Sized (SOFTBTSZ)  Supplement Feeding Modality:  Oral  Nepro Cans or Servings Per Day:  1       Frequency:  Three Times a day (06-24-22 @ 08:59) [Pending Verification By Attending]        GI PPx:			pantoprazole    Tablet 40 milliGRAM(s) Oral daily    DVT Ppx:		On hold    	RISK                                                          Points  	[ x] Previous VTE                                           	3  	[ ] Thrombophilia                                        	2  	[ x] Lower limb paralysis                              	2   	[ ] Current Cancer                                       	2   	[ ] Immobilization > 24 hrs                        	1  	[ ] ICU/CCU stay > 24 hours                       	1  	[x ] Age > 60                                                   	1  		Total:[6 ]      Activity:		    Head of Bed:               35-45 Deg  Glycemic Control:          albumin human 25% IVPB 100 milliLiter(s) IV Intermittent once  atorvastatin 80 milliGRAM(s) Oral at bedtime  dextrose 5%. 1000 milliLiter(s) IV Continuous <Continuous>  dextrose 5%. 1000 milliLiter(s) IV Continuous <Continuous>  dextrose 50% Injectable 25 Gram(s) IV Push once  dextrose 50% Injectable 12.5 Gram(s) IV Push once  dextrose 50% Injectable 25 Gram(s) IV Push once  insulin glargine Injectable (LANTUS) 6 Unit(s) SubCutaneous at bedtime  insulin lispro (ADMELOG) corrective regimen sliding scale   SubCutaneous at bedtime  insulin lispro (ADMELOG) corrective regimen sliding scale   SubCutaneous three times a day before meals  sodium bicarbonate  Infusion 0.265 mEq/kG/Hr IV Continuous <Continuous>      Lines: PIV  CENTRAL LINE: 	[ ] YES [ ] NO	                    LOCATION:   	                       DATE INSERTED:   	                    REMOVE:  [ ] YES [ ] NO    A-LINE:  	                [ ] YES [ ] NO                      LOCATION:   	                       DATE INSERTED: 		            REMOVE:  [ ] YES [ ] NO    HATFIELD: 		        [ x] YES [ ] NO  		                                       DATE INSERTED:		            REMOVE:  [ ] YES [ x] NO   // suspect retention    Restraints were deemed necessary to prevent removal of life-sustaining devices [  ] YES   [  x  ]  NO    Disposition: ICU care    Goals of Care:

## 2022-06-24 NOTE — DIETITIAN INITIAL EVALUATION ADULT - OTHER CALCULATIONS
Based on IBW due to ? admit weight as patient weight previous weight prior to discharge was 115/52.2kg

## 2022-06-24 NOTE — PROGRESS NOTE ADULT - NUTRITIONAL ASSESSMENT
This patient has been assessed with a concern for Malnutrition and has been determined to have a diagnosis/diagnoses of Severe protein-calorie malnutrition.    This patient is being managed with:   Diet Soft and Bite Sized-  Entered: Jun 24 2022  8:14AM    The following pending diet order is being considered for treatment of Severe protein-calorie malnutrition:  Diet Consistent Carbohydrate/No Snacks-  Soft and Bite Sized (SOFTBTSZ)  Supplement Feeding Modality:  Oral  Nepro Cans or Servings Per Day:  1       Frequency:  Three Times a day  Entered: Jun 24 2022  8:59AM

## 2022-06-25 DIAGNOSIS — R33.9 RETENTION OF URINE, UNSPECIFIED: ICD-10-CM

## 2022-06-25 LAB
ALBUMIN SERPL ELPH-MCNC: 1.5 G/DL — LOW (ref 3.3–5)
ALBUMIN SERPL ELPH-MCNC: 1.7 G/DL — LOW (ref 3.3–5)
ALP SERPL-CCNC: 172 U/L — HIGH (ref 40–120)
ALP SERPL-CCNC: 208 U/L — HIGH (ref 40–120)
ALT FLD-CCNC: 26 U/L — SIGNIFICANT CHANGE UP (ref 10–45)
ALT FLD-CCNC: 31 U/L — SIGNIFICANT CHANGE UP (ref 10–45)
ANION GAP SERPL CALC-SCNC: 10 MMOL/L — SIGNIFICANT CHANGE UP (ref 5–17)
ANION GAP SERPL CALC-SCNC: 9 MMOL/L — SIGNIFICANT CHANGE UP (ref 5–17)
APTT BLD: 45.4 SEC — HIGH (ref 27.5–35.5)
APTT BLD: 50.1 SEC — HIGH (ref 27.5–35.5)
AST SERPL-CCNC: 61 U/L — HIGH (ref 10–40)
AST SERPL-CCNC: 73 U/L — HIGH (ref 10–40)
BILIRUB SERPL-MCNC: 0.4 MG/DL — SIGNIFICANT CHANGE UP (ref 0.2–1.2)
BILIRUB SERPL-MCNC: 0.4 MG/DL — SIGNIFICANT CHANGE UP (ref 0.2–1.2)
BUN SERPL-MCNC: 83 MG/DL — HIGH (ref 7–23)
BUN SERPL-MCNC: 85 MG/DL — HIGH (ref 7–23)
CALCIUM SERPL-MCNC: 7 MG/DL — LOW (ref 8.4–10.5)
CALCIUM SERPL-MCNC: 7.8 MG/DL — LOW (ref 8.4–10.5)
CHLORIDE SERPL-SCNC: 105 MMOL/L — SIGNIFICANT CHANGE UP (ref 96–108)
CHLORIDE SERPL-SCNC: 106 MMOL/L — SIGNIFICANT CHANGE UP (ref 96–108)
CO2 SERPL-SCNC: 29 MMOL/L — SIGNIFICANT CHANGE UP (ref 22–31)
CO2 SERPL-SCNC: 29 MMOL/L — SIGNIFICANT CHANGE UP (ref 22–31)
CREAT SERPL-MCNC: 4.57 MG/DL — HIGH (ref 0.5–1.3)
CREAT SERPL-MCNC: 4.85 MG/DL — HIGH (ref 0.5–1.3)
D DIMER BLD IA.RAPID-MCNC: 525 NG/ML DDU — HIGH
EGFR: 10 ML/MIN/1.73M2 — LOW
EGFR: 9 ML/MIN/1.73M2 — LOW
GLUCOSE BLDC GLUCOMTR-MCNC: 126 MG/DL — HIGH (ref 70–99)
GLUCOSE BLDC GLUCOMTR-MCNC: 151 MG/DL — HIGH (ref 70–99)
GLUCOSE BLDC GLUCOMTR-MCNC: 173 MG/DL — HIGH (ref 70–99)
GLUCOSE BLDC GLUCOMTR-MCNC: 62 MG/DL — LOW (ref 70–99)
GLUCOSE BLDC GLUCOMTR-MCNC: 66 MG/DL — LOW (ref 70–99)
GLUCOSE BLDC GLUCOMTR-MCNC: 97 MG/DL — SIGNIFICANT CHANGE UP (ref 70–99)
GLUCOSE SERPL-MCNC: 153 MG/DL — HIGH (ref 70–99)
GLUCOSE SERPL-MCNC: 63 MG/DL — LOW (ref 70–99)
HCT VFR BLD CALC: 21.8 % — LOW (ref 34.5–45)
HGB BLD-MCNC: 7.2 G/DL — LOW (ref 11.5–15.5)
INR BLD: 3.41 RATIO — HIGH (ref 0.88–1.16)
INR BLD: 5.73 RATIO — CRITICAL HIGH (ref 0.88–1.16)
LDH SERPL L TO P-CCNC: 264 U/L — HIGH (ref 50–242)
MAGNESIUM SERPL-MCNC: 1.3 MG/DL — LOW (ref 1.6–2.6)
MAGNESIUM SERPL-MCNC: 2.7 MG/DL — HIGH (ref 1.6–2.6)
MCHC RBC-ENTMCNC: 27 PG — SIGNIFICANT CHANGE UP (ref 27–34)
MCHC RBC-ENTMCNC: 33 GM/DL — SIGNIFICANT CHANGE UP (ref 32–36)
MCV RBC AUTO: 81.6 FL — SIGNIFICANT CHANGE UP (ref 80–100)
NRBC # BLD: 0 /100 WBCS — SIGNIFICANT CHANGE UP (ref 0–0)
PHOSPHATE SERPL-MCNC: 3.3 MG/DL — SIGNIFICANT CHANGE UP (ref 2.5–4.5)
PHOSPHATE SERPL-MCNC: 3.5 MG/DL — SIGNIFICANT CHANGE UP (ref 2.5–4.5)
PLATELET # BLD AUTO: 418 K/UL — HIGH (ref 150–400)
POTASSIUM SERPL-MCNC: 2.9 MMOL/L — CRITICAL LOW (ref 3.5–5.3)
POTASSIUM SERPL-MCNC: 3.9 MMOL/L — SIGNIFICANT CHANGE UP (ref 3.5–5.3)
POTASSIUM SERPL-SCNC: 2.9 MMOL/L — CRITICAL LOW (ref 3.5–5.3)
POTASSIUM SERPL-SCNC: 3.9 MMOL/L — SIGNIFICANT CHANGE UP (ref 3.5–5.3)
PROT SERPL-MCNC: 5.5 G/DL — LOW (ref 6–8.3)
PROT SERPL-MCNC: 6.1 G/DL — SIGNIFICANT CHANGE UP (ref 6–8.3)
PROTHROM AB SERPL-ACNC: 40.1 SEC — HIGH (ref 10.5–13.4)
PROTHROM AB SERPL-ACNC: 67.7 SEC — HIGH (ref 10.5–13.4)
RBC # BLD: 2.67 M/UL — LOW (ref 3.8–5.2)
RBC # FLD: 22.5 % — HIGH (ref 10.3–14.5)
SODIUM SERPL-SCNC: 144 MMOL/L — SIGNIFICANT CHANGE UP (ref 135–145)
SODIUM SERPL-SCNC: 144 MMOL/L — SIGNIFICANT CHANGE UP (ref 135–145)
WBC # BLD: 20.84 K/UL — HIGH (ref 3.8–10.5)
WBC # FLD AUTO: 20.84 K/UL — HIGH (ref 3.8–10.5)

## 2022-06-25 PROCEDURE — 99232 SBSQ HOSP IP/OBS MODERATE 35: CPT

## 2022-06-25 RX ORDER — MAGNESIUM SULFATE 500 MG/ML
2 VIAL (ML) INJECTION
Refills: 0 | Status: COMPLETED | OUTPATIENT
Start: 2022-06-25 | End: 2022-06-25

## 2022-06-25 RX ORDER — DEXTROSE 50 % IN WATER 50 %
12.5 SYRINGE (ML) INTRAVENOUS ONCE
Refills: 0 | Status: COMPLETED | OUTPATIENT
Start: 2022-06-25 | End: 2022-06-25

## 2022-06-25 RX ORDER — POTASSIUM CHLORIDE 20 MEQ
10 PACKET (EA) ORAL
Refills: 0 | Status: COMPLETED | OUTPATIENT
Start: 2022-06-25 | End: 2022-06-25

## 2022-06-25 RX ORDER — MAGNESIUM SULFATE 500 MG/ML
2 VIAL (ML) INJECTION ONCE
Refills: 0 | Status: DISCONTINUED | OUTPATIENT
Start: 2022-06-25 | End: 2022-06-25

## 2022-06-25 RX ORDER — SODIUM CHLORIDE 9 MG/ML
1000 INJECTION, SOLUTION INTRAVENOUS
Refills: 0 | Status: DISCONTINUED | OUTPATIENT
Start: 2022-06-25 | End: 2022-06-26

## 2022-06-25 RX ORDER — MAGNESIUM SULFATE 500 MG/ML
4 VIAL (ML) INJECTION ONCE
Refills: 0 | Status: DISCONTINUED | OUTPATIENT
Start: 2022-06-25 | End: 2022-06-25

## 2022-06-25 RX ADMIN — Medication 25 GRAM(S): at 11:39

## 2022-06-25 RX ADMIN — CEFEPIME 100 MILLIGRAM(S): 1 INJECTION, POWDER, FOR SOLUTION INTRAMUSCULAR; INTRAVENOUS at 12:02

## 2022-06-25 RX ADMIN — SERTRALINE 50 MILLIGRAM(S): 25 TABLET, FILM COATED ORAL at 13:05

## 2022-06-25 RX ADMIN — SODIUM CHLORIDE 100 MILLILITER(S): 9 INJECTION, SOLUTION INTRAVENOUS at 05:25

## 2022-06-25 RX ADMIN — Medication 650 MILLIGRAM(S): at 07:50

## 2022-06-25 RX ADMIN — CHLORHEXIDINE GLUCONATE 1 APPLICATION(S): 213 SOLUTION TOPICAL at 05:10

## 2022-06-25 RX ADMIN — MIDODRINE HYDROCHLORIDE 5 MILLIGRAM(S): 2.5 TABLET ORAL at 05:11

## 2022-06-25 RX ADMIN — PANTOPRAZOLE SODIUM 40 MILLIGRAM(S): 20 TABLET, DELAYED RELEASE ORAL at 13:09

## 2022-06-25 RX ADMIN — SODIUM CHLORIDE 100 MILLILITER(S): 9 INJECTION, SOLUTION INTRAVENOUS at 10:08

## 2022-06-25 RX ADMIN — Medication 100 MILLIEQUIVALENT(S): at 12:07

## 2022-06-25 RX ADMIN — INSULIN GLARGINE 6 UNIT(S): 100 INJECTION, SOLUTION SUBCUTANEOUS at 22:31

## 2022-06-25 RX ADMIN — TRAMADOL HYDROCHLORIDE 25 MILLIGRAM(S): 50 TABLET ORAL at 04:48

## 2022-06-25 RX ADMIN — MIDODRINE HYDROCHLORIDE 5 MILLIGRAM(S): 2.5 TABLET ORAL at 14:51

## 2022-06-25 RX ADMIN — Medication 12.5 GRAM(S): at 08:05

## 2022-06-25 RX ADMIN — MIRTAZAPINE 15 MILLIGRAM(S): 45 TABLET, ORALLY DISINTEGRATING ORAL at 22:24

## 2022-06-25 RX ADMIN — Medication 25 GRAM(S): at 09:51

## 2022-06-25 RX ADMIN — ATORVASTATIN CALCIUM 80 MILLIGRAM(S): 80 TABLET, FILM COATED ORAL at 22:24

## 2022-06-25 RX ADMIN — TRAMADOL HYDROCHLORIDE 25 MILLIGRAM(S): 50 TABLET ORAL at 05:48

## 2022-06-25 RX ADMIN — Medication 650 MILLIGRAM(S): at 06:51

## 2022-06-25 RX ADMIN — Medication 1: at 17:41

## 2022-06-25 RX ADMIN — Medication 100 MILLIEQUIVALENT(S): at 11:04

## 2022-06-25 RX ADMIN — Medication 100 MILLIEQUIVALENT(S): at 14:50

## 2022-06-25 NOTE — PROGRESS NOTE ADULT - SUBJECTIVE AND OBJECTIVE BOX
Follow-up Critical Care Progress Note  Chief Complaint : Acute renal failure      patient feeling better  less sob  no cp, palp, n/v  on RA  off pressors  off bicarb  noted to have hypoglycemia episode this am          Allergies :No Known Allergies      PAST MEDICAL & SURGICAL HISTORY:  CVA (cerebrovascular accident)  Hyperlipidemia  Stage 1 chronic kidney disease  Hypothyroidism  Hemiplegia due to infarction of brain  History of hysterectomy        Medications:  MEDICATIONS  (STANDING):  atorvastatin 80 milliGRAM(s) Oral at bedtime  cefepime   IVPB 500 milliGRAM(s) IV Intermittent daily  chlorhexidine 2% Cloths 1 Application(s) Topical <User Schedule>  dextrose 5%. 1000 milliLiter(s) (100 mL/Hr) IV Continuous <Continuous>  dextrose 5%. 1000 milliLiter(s) (50 mL/Hr) IV Continuous <Continuous>  dextrose 50% Injectable 25 Gram(s) IV Push once  dextrose 50% Injectable 12.5 Gram(s) IV Push once  dextrose 50% Injectable 25 Gram(s) IV Push once  insulin glargine Injectable (LANTUS) 6 Unit(s) SubCutaneous at bedtime  insulin lispro (ADMELOG) corrective regimen sliding scale   SubCutaneous at bedtime  insulin lispro (ADMELOG) corrective regimen sliding scale   SubCutaneous three times a day before meals  lactated ringers. 1000 milliLiter(s) (100 mL/Hr) IV Continuous <Continuous>  magnesium sulfate  IVPB 2 Gram(s) IV Intermittent every 2 hours  midodrine 5 milliGRAM(s) Oral every 8 hours  mirtazapine 15 milliGRAM(s) Oral at bedtime  pantoprazole    Tablet 40 milliGRAM(s) Oral daily  phenylephrine    Infusion 0.1 MICROgram(s)/kG/Min (2.66 mL/Hr) IV Continuous <Continuous>  potassium chloride  10 mEq/100 mL IVPB 10 milliEquivalent(s) IV Intermittent every 1 hour  sertraline 50 milliGRAM(s) Oral daily    MEDICATIONS  (PRN):  acetaminophen     Tablet .. 650 milliGRAM(s) Oral every 6 hours PRN Temp greater or equal to 38C (100.4F), Mild Pain (1 - 3)  traMADol 25 milliGRAM(s) Oral every 12 hours PRN Moderate Pain (4 - 6)      Antibiotics History  cefepime   IVPB 500 milliGRAM(s) IV Intermittent daily, 22 @ 00:00  cefepime   IVPB 2000 milliGRAM(s) IV Intermittent once, 22 @ 11:48, Stop order after: 1 Doses  cefepime   IVPB    , 22 @ 18:23  piperacillin/tazobactam IVPB.. 3.375 Gram(s) IV Intermittent every 12 hours, 22 @ 15:57, Stop order after: 10 Days  vancomycin  IVPB 1000 milliGRAM(s) IV Intermittent Once, 22 @ 11:48, Stop order after: 1 Doses      Heme Medications       GI Medications  pantoprazole    Tablet 40 milliGRAM(s) Oral daily, 22 @ 18:35, Routine      COVID  22 @ 12:21  COVID -   [facility]  22 @ 18:10  COVID [facility]      COVID Biomarkers    22 @ 22:40 ESR --  ---  CRP --  ---  DDimer  525<H>   ---   LDH --   ---   Ferritin --    22 @ 18:10 ESR --  ---  CRP --  ---  DDimer  1611<H>   ---   LDH --   ---   Ferritin --            Trend Cardiac Enzymes  22 @ 12:19  [id number]/Trop I - 946<H> - --  - --  -  --  /  --    Trend BNP    Procalcitonin Trend  22 @ 18:10   -   0.09<H>    WBC Trend  22 @ 05:30   -  20.84<H>  22 @ 22:40   -  18.89<H>  22 @ 12:15   -  21.54<H>  22 @ 06:10   -  23.73<H>  22 @ 01:20   -  21.72<H>  22 @ 20:40   -  22.57<H>    H/H Trend  22 @ 05:30   -   7.2<L>/ 21.8<L>  22 @ 22:40   -   7.6<L>/ 21.8<L>  22 @ 12:15   -   7.3<L>/ 21.1<L>  22 @ 06:10   -   7.7<L>/ 22.9<L>  22 @ 01:20   -   7.5<L>/ 22.5<L>  22 @ 20:40   -   7.4<L>/ 22.4<L>    Stool Occult Blood    Platelet Trend  22 @ 05:30   -  418<H>  22 @ 22:40   -  284  22 @ 12:15   -  258  22 @ 06:10   -  307  22 @ 01:20   -  294  22 @ 20:40   -  310    Trend Sodium  22 @ 08:20   -  144  22 @ 12:15   -  141  22 @ 06:10   -  141  22 @ 01:20   -  138  22 @ 20:40   -  139  22 @ 12:19   -  139    Trend Potassium  22 @ 08:20   -  2.9<LL>  22 @ 12:15   -  3.3<L>  22 @ 06:10   -  3.3<L>  22 @ 01:20   -  4.3  22 @ 20:40   -  3.1<L>  22 @ 12:19   -  3.7    Trend Bun/Cr  22 @ 08:20  BUN/CR -  83<H> / 4.85<H>  22 @ 12:15  BUN/CR -  99<H> / 5.45<H>  22 @ 06:10  BUN/CR -  101<H> / 5.57<H>  22 @ 01:20  BUN/CR -  99<H> / 5.78<H>  22 @ 20:40  BUN/CR -  101<H> / 5.66<H>  22 @ 12:19  BUN/CR -  116<H> / 6.91<H>    Lactic Acid Trend  22 @ 12:19   -   1.3    ABG Trend    Trend AST/ALT/ALK Phos/Bili  22 @ 08:20   61<H>/26/172<H>/0.4  22 @ 12:15   68<H>/27/159<H>/0.3  22 @ 20:40   64<H>/23/125<H>/0.3  22 @ 12:19   81<H>/32/126<H>/0.4  22 @ 06:25   17/9<L>/75/0.3  22 @ 18:10   31/18/76/0.3         Albumin Trend  22 @ 08:20   -   1.5<L>  22 @ 12:15   -   1.9<L>  22 @ 20:40   -   1.5<L>  22 @ 12:19   -   2.0<L>  22 @ 06:25   -   3.0<L>  22 @ 18:10   -   2.8<L>      PTT - PT - INR Trend  22 @ 05:30   -   45.4<H> - 40.1<H> - 3.41<H>  22 @ 22:40   -   50.1<H> - 67.7<H> - 5.73<HH>  22 @ 06:10   -   91.5<H> - > 200<!> - >14.99<HH>  22 @ 01:20   -   98.9<H> - > 200<!> - >15<!!>  22 @ 06:25   -   49.0<H> - 53.7<H> - 4.56<H>  22 @ 07:50   -   89.5<H> - 16.5<H> - 1.42<H>    Glucose Trend  22 @ 08:29   -  -- -- 126<H>  22 @ 08:20   -  -- 63<L> --  22 @ 07:59   -  -- -- 66<L>  22 @ 07:56   -  -- -- 62<L>  22 @ 21:37   -  -- -- 107<H>  22 @ 17:25   -  -- -- 191<H>  22 @ 15:11   -  -- -- 193<H>  22 @ 12:15   -  -- 165<H> --  22 @ 11:36   -  -- -- 172<H>  22 @ 06:10   -  -- 118<H> --    A1C with Estimated Average Glucose Result: 6.1 % *H* [4.0 - 5.6] (22 @ 06:25)      LABS:                        7.2    20.84 )-----------( 418      ( 2022 05:30 )             21.8         144  |  105  |  83<H>  ----------------------------<  63<L>  2.9<LL>   |  29  |  4.85<H>    Ca    7.0<L>      2022 08:20  Phos  3.5       Mg     1.3         TPro  5.5<L>  /  Alb  1.5<L>  /  TBili  0.4  /  DBili  x   /  AST  61<H>  /  ALT  26  /  AlkPhos  172<H>      HIT ab --  @ 22:40  HIT ab EIA --  D Dimer -525    CARDIAC MARKERS ( 2022 12:19 )  x     / x     / 946 U/L / x     / x            PT/INR - ( 2022 05:30 )   PT: 40.1 sec;   INR: 3.41 ratio         PTT - ( 2022 05:30 )  PTT:45.4 sec  Urinalysis Basic - ( 2022 15:30 )    Color: Brown / Appearance: very cloudy / S.015 / pH: x  Gluc: x / Ketone: Negative  / Bili: Negative / Urobili: 1   Blood: x / Protein: 500 mg/dL / Nitrite: Positive   Leuk Esterase: Moderate / RBC: >50 /HPF / WBC >50 /HPF   Sq Epi: x / Non Sq Epi: Neg.-Few / Bacteria: Moderate /HPF              CULTURES: (if applicable)    Culture - Urine (collected 22 @ 15:33)  Source: Clean Catch Clean Catch (Midstream)  Final Report (22 @ 22:42):    >=3 organisms. Probable collection contamination.    Culture - Blood (collected 22 @ 12:21)  Source: .Blood Blood-Peripheral  Preliminary Report (22 @ 16:01):    No growth to date.    Culture - Blood (collected 22 @ 12:21)  Source: .Blood Blood-Peripheral  Preliminary Report (22 @ 16:01):    No growth to date.    Culture - Urine (collected 22 @ 02:00)  Source: Clean Catch Clean Catch (Midstream)  Final Report (22 @ 17:41):    >100,000 CFU/ml Escherichia coli  Organism: Escherichia coli (22 @ 17:41)  Organism: Escherichia coli (22 @ 17:41)      -  Amikacin: S <=16      -  Amoxicillin/Clavulanic Acid: S <=8/4      -  Ampicillin: S <=8 These ampicillin results predict results for amoxicillin      -  Ampicillin/Sulbactam: S <=4/2 Enterobacter, Klebsiella aerogenes, Citrobacter, and Serratia may develop resistance during prolonged therapy (3-4 days)      -  Aztreonam: S <=4      -  Cefazolin: S <=2 ([id number]) For uncomplicated UTI with K. pneumoniae, E. coli, or P. mirablis: LIONEL <=16 is sensitive and LIONEL >=32 is resistant. This also predicts results for oral agents cefaclor, cefdinir, cefpodoxime, cefprozil, cefuroxime axetil, cephalexin and locarbef for uncomplicated UTI. Note that some isolates may be susceptible to these agents while testing resistant to cefazolin.      -  Cefepime: S <=2      -  Cefoxitin: S <=8      -  Ceftriaxone: S <=1 Enterobacter, Klebsiella aerogenes, Citrobacter, and Serratia may develop resistance during prolonged therapy      -  Ciprofloxacin: S <=0.25      -  Ertapenem: S <=0.5      -  Gentamicin: S <=2      -  Imipenem: S <=1      -  Levofloxacin: S <=0.5      -  Meropenem: S <=1      -  Nitrofurantoin: S <=32 Should not be used to treat pyelonephritis      -  Piperacillin/Tazobactam: S <=8      -  Tigecycline: S <=2      -  Tobramycin: S <=2      -  Trimethoprim/Sulfamethoxazole: S <=0.5/9.5      Method Type: LIONEL      Rapid RVP Result: NotDetec (22 @ 12:21)        CAPILLARY BLOOD GLUCOSE      POCT Blood Glucose.: 126 mg/dL (2022 08:29)      RADIOLOGY  CXR:      CT:    ECHO:      VITALS:  T(C): 36.4 (22 @ 05:00), Max: 36.9 (22 @ 22:00)  T(F): 97.6 (22 @ 05:00), Max: 98.4 (22 @ 22:00)  HR: 91 (22 @ 07:00) (89 - 126)  BP: 100/55 (22 @ 07:00) (84/48 - 131/58)  BP(mean): 70 (22 @ 07:00) (59 - 95)  ABP: --  ABP(mean): --  RR: 14 (22 @ 07:00) (13 - 24)  SpO2: 96% (22 07:00) (91% - 99%)  CVP(mm Hg): --  CVP(cm H2O): --    Ins and Outs     22 @ 07:01  -  22 @ 07:00  --------------------------------------------------------  IN: 2795 mL / OUT: 1775 mL / NET: 1020 mL        Height (cm): 157.5 (22 @ 19:30)  Weight (kg): 61.1 (22 @ 19:30)  BMI (kg/m2): 24.6 (22 @ 19:30)        I&O's Detail    2022 07:01  -  2022 07:00  --------------------------------------------------------  IN:    Albumin 25%  -  50 mL: 100 mL    IV PiggyBack: 50 mL    IV PiggyBack: 400 mL    Lactated Ringers: 1100 mL    Oral Fluid: 120 mL    Phenylephrine: 25 mL    Sodium Bicarbonate: 1000 mL  Total IN: 2795 mL    OUT:    Indwelling Catheter - Urethral (mL): 1775 mL  Total OUT: 1775 mL    Total NET: 1020 mL

## 2022-06-25 NOTE — SWALLOW BEDSIDE ASSESSMENT ADULT - SWALLOW EVAL: DIAGNOSIS
Patient was received sitting upright in bed. Oral cavity appeared dry and cakey secretions on tongue- nursing made aware. Patient accepted PO trials of puree, soft and bite sized foods, regular foods and thin liquids (via straw). Patient with adequate oral acceptance. Delayed oral transit time suspected secondary to poor dentition and munch chew pattern; however, mastication was adequate for breakdown of soft foods. Increased difficulty breaking down regular foods this date. Suspected delayed A-P transport and swallow trigger across consistencies. No clinically overt signs/symptoms of aspiration/penetration. No residue post swallow. Recommend diet of soft/bite sized foods and thin liquids, full assistance during meals, meticulous oral care 3-4x/day. SLP to follow up for tolerance. Discussed all of the above with nursing.

## 2022-06-25 NOTE — CONSULT NOTE ADULT - SUBJECTIVE AND OBJECTIVE BOX
Patient is a 72y old  Female who presents with a chief complaint of Sepsis (2022 21:43)      HPI:  72F hx of CVA with dense L hemiparesis since , HTN, HLD, hypothyroid, CKD3, T2DM on insulin, depression presented to the ED from  with abnormal labs, sepsis x 1-2 days. Pt was discharged from [facility] to  3 days ago s/p transient L facial droop, CT Head negative for acute findings, hospital course complicated by LLE DVT started on heparin gtt due to renal dysfunction with further complication of hematuria due to likely UTI. Pt was transitioned to Eliquis and discharged to Rehab with Ceftin to complete course of abx. While at Rehab, pt noted to have abdominal discomfort, hypotensive, and with leukocytosis of 36 on bloodwork. She was started on IVF + Ceftriaxone + Zosyn. Repeat bloodwork with improve WBC to 27. Pt today, however, noted to have significant abdominal pain and discomfort, screaming in pain. Her provider requested pt to be transferred to the ED for higher level of care.  In the ED pt hypotensive, improved s/p IVF. Minimally answering questions, screaming in pain.  (2022 15:59)    previously seen by Dr. 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  yes constipation.   NEUROLOGICAL: No mental status changes  PSYCH: No depression, no mood changes  SKIN: No itching      MEDICATIONS  (STANDING):  atorvastatin 80 milliGRAM(s) Oral at bedtime  cefepime   IVPB 500 milliGRAM(s) IV Intermittent daily  chlorhexidine 2% Cloths 1 Application(s) Topical <User Schedule>  dextrose 5%. 1000 milliLiter(s) (100 mL/Hr) IV Continuous <Continuous>  dextrose 5%. 1000 milliLiter(s) (50 mL/Hr) IV Continuous <Continuous>  dextrose 50% Injectable 25 Gram(s) IV Push once  dextrose 50% Injectable 12.5 Gram(s) IV Push once  dextrose 50% Injectable 25 Gram(s) IV Push once  insulin glargine Injectable (LANTUS) 6 Unit(s) SubCutaneous at bedtime  insulin lispro (ADMELOG) corrective regimen sliding scale   SubCutaneous at bedtime  insulin lispro (ADMELOG) corrective regimen sliding scale   SubCutaneous three times a day before meals  lactated ringers. 1000 milliLiter(s) (100 mL/Hr) IV Continuous <Continuous>  midodrine 5 milliGRAM(s) Oral every 8 hours  mirtazapine 15 milliGRAM(s) Oral at bedtime  pantoprazole    Tablet 40 milliGRAM(s) Oral daily  phenylephrine    Infusion 0.1 MICROgram(s)/kG/Min (2.66 mL/Hr) IV Continuous <Continuous>  sertraline 50 milliGRAM(s) Oral daily    MEDICATIONS  (PRN):  acetaminophen     Tablet .. 650 milliGRAM(s) Oral every 6 hours PRN Temp greater or equal to 38C (100.4F), Mild Pain (1 - 3)  traMADol 25 milliGRAM(s) Oral every 12 hours PRN Moderate Pain (4 - 6)      Allergies    No Known Allergies    Intolerances        SOCIAL HISTORY: No illicit drug use    FAMILY HISTORY:  FH: CAD (coronary artery disease)    Family history of CVA    FH: diabetes mellitus          T(C): 36.4 (22 @ 05:00), Max: 37.1 (22 @ 12:03)  T(F): 97.6 (22 @ 05:00), Max: 98.8 (22 @ 12:03)  HR: 91 (22 @ 07:00) (83 - 126)  BP: 100/55 (22 @ 07:00) (48/24 - 131/58)  RR: 14 (22 @ 07:00) (13 - 24)  SpO2: 96% (22 @ 07:00) (70% - 100%)      PHYSICAL EXAM:    Constitutional: alert, no acute distress  Back: No CVA tenderness  Respiratory: No accessory respiratory muscle use  Abd: Soft, NT/ND  no organomegaly  no hernia  : no bladder distention, rodriguez draining  Extremities: no edema  Neurological: A/O x 3  Psychiatric: Normal mood, normal affect  Skin: No rashes      22 @ 07:01  -  22 @ 07:00  --------------------------------------------------------  IN:  Total IN: 0 mL    OUT:    Indwelling Catheter - Urethral (mL): 1125 mL  Total OUT: 1125 mL    Total NET: -1125 mL      22 @ 07:01  -  22 @ 07:00  --------------------------------------------------------  IN:  Total IN: 0 mL    OUT:    Indwelling Catheter - Urethral (mL): 1775 mL  Total OUT: 1775 mL    Total NET: -1775 mL        22 @ 07:01  -  22 @ 07:00  --------------------------------------------------------  IN: 2795 mL / OUT: 1775 mL / NET: 1020 mL            LABS:                        7.2    20.84 )-----------( 418      ( 2022 05:30 )             21.8       PT/INR - ( 2022 05:30 )   PT: 40.1 sec;   INR: 3.41 ratio         PTT - ( 2022 05:30 )  PTT:45.4 sec  Urinalysis Basic - ( 2022 15:30 )    Color: Brown / Appearance: very cloudy / S.015 / pH: x  Gluc: x / Ketone: Negative  / Bili: Negative / Urobili: 1   Blood: x / Protein: 500 mg/dL / Nitrite: Positive   Leuk Esterase: Moderate / RBC: >50 /HPF / WBC >50 /HPF   Sq Epi: x / Non Sq Epi: Neg.-Few / Bacteria: Moderate /HPF          Culture - Urine (collected 22 @ 15:33)  Source: Clean Catch Clean Catch (Midstream)  Final Report (22 @ 22:42):    >=3 organisms. Probable collection contamination.    Culture - Blood (collected 22 @ 12:21)  Source: .Blood Blood-Peripheral  Preliminary Report (22 @ 16:01):    No growth to date.    Culture - Blood (collected 22 @ 12:21)  Source: .Blood Blood-Peripheral  Preliminary Report (22 @ 16:01):    No growth to date.        RADIOLOGY & ADDITIONAL STUDIES:
HPI:   Patient is a 72y female with a past history of a remote CVA with left sided weakness, HTN,HLD,DM,hypothyroidism, CKD and recent 5 day [facility] admission for transient left sided neurological signs, snd out on ceftin for E Coli  UTI, who was admitted  with hypotension and an MAXIMILIAN.  Her creat had increased from 2.2 to 6.7, her wbc had increased to over 30,000.  She reportedly was not feeling well for a few days at , had abdominal pain, had received CTX and zosyn, was sent to ER  for evaluation.  She had rodriguez placed, over 500 cc urine, received fluids for BP in the 80's and antibiotics continued.She appears somewhat slow in her responses, has nonspecific abdominal discomfort.She is presently on cefepime.She has also received vanco and zosyn.    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 650 milliGRAM(s) Oral every 6 hours PRN  atorvastatin 80 milliGRAM(s) Oral at bedtime  chlorhexidine 2% Cloths 1 Application(s) Topical <User Schedule>  dextrose 5%. 1000 milliLiter(s) IV Continuous <Continuous>  dextrose 5%. 1000 milliLiter(s) IV Continuous <Continuous>  dextrose 50% Injectable 25 Gram(s) IV Push once  dextrose 50% Injectable 12.5 Gram(s) IV Push once  dextrose 50% Injectable 25 Gram(s) IV Push once  insulin glargine Injectable (LANTUS) 6 Unit(s) SubCutaneous at bedtime  insulin lispro (ADMELOG) corrective regimen sliding scale   SubCutaneous at bedtime  insulin lispro (ADMELOG) corrective regimen sliding scale   SubCutaneous three times a day before meals  mirtazapine 15 milliGRAM(s) Oral at bedtime  pantoprazole    Tablet 40 milliGRAM(s) Oral daily  phenylephrine    Infusion 0.1 MICROgram(s)/kG/Min IV Continuous <Continuous>  sertraline 50 milliGRAM(s) Oral daily  sodium bicarbonate  Infusion 0.265 mEq/kG/Hr IV Continuous <Continuous>  traMADol 25 milliGRAM(s) Oral every 12 hours PRN      FAMILY HISTORY:  FH: CAD (coronary artery disease)    Family history of CVA    FH: diabetes mellitus        SOCIAL HISTORY:  Smoking:  x   ETOH: x    Drug Use: x  Single     T(F): 98.3 (22 @ 11:00), Max: 98.7 (22 @ 07:00)  HR: 109 (22 @ 12:00)  BP: 106/60 (22 @ 12:00)  RR: 19 (22 @ 12:00)  SpO2: 94% (22 @ 12:00)  Wt(kg): --    PHYSICAL EXAM:  General: alert, no acute distress  Eyes:  anicteric, no conjunctival injection, no discharge  Oropharynx: no lesions or injection 	  Neck: supple, without adenopathy  Lungs: clear to auscultation  Heart: regular rate and rhythm; no murmur, rubs or gallops  Abdomen: soft, nondistended,mild nonspecific tenderness,, without mass or organomegaly  Skin: no lesions  Extremities: no clubbing, cyanosis, or edema  Neurologic: alert, oriented, left sided hemiparesis   rodriguez  LAB RESULTS:                        7.3    21.54 )-----------( 258      ( 2022 12:15 )             21.1         141  |  104  |  99<H>  ----------------------------<  165<H>  3.3<L>   |  25  |  5.45<H>    Ca    6.9<L>      2022 12:15  Phos  4.0       Mg     1.7         TPro  5.8<L>  /  Alb  1.9<L>  /  TBili  0.3  /  DBili  x   /  AST  68<H>  /  ALT  27  /  AlkPhos  159<H>      LIVER FUNCTIONS - ( 2022 12:15 )  Alb: 1.9 g/dL / Pro: 5.8 g/dL / ALK PHOS: 159 U/L / ALT: 27 U/L / AST: 68 U/L / GGT: x           Urinalysis Basic - ( 2022 15:30 )    Color: Brown / Appearance: very cloudy / S.015 / pH: x  Gluc: x / Ketone: Negative  / Bili: Negative / Urobili: 1   Blood: x / Protein: 500 mg/dL / Nitrite: Positive   Leuk Esterase: Moderate / RBC: >50 /HPF / WBC >50 /HPF   Sq Epi: x / Non Sq Epi: Neg.-Few / Bacteria: Moderate /HPF        MICROBIOLOGY:  RECENT CULTURES:        RADIOLOGY REVIEWED:  < from: CT Abdomen and Pelvis No Cont (22 @ 14:09) >  IMPRESSION:  No bowel obstruction or grossly thickened bowel wall. Colonic   diverticulosis without evidence of diverticulitis. Appendix within normal   limits. Moderate amount of stool in the rectum.    Small bilateral pleural effusions    Apparent 1.7 cm heterogeneous left renal lesion with Hounsfield unit of   33; if clinically indicated, abdominal MR without and with IV contrast   may be pursued to rule out a solid malignant neoplasm.    < end of copied text >  < from: Xray Chest 1 View-PORTABLE IMMEDIATE (22 @ 13:10) >  IMPRESSION:  1. Shallow inspiratory effort however no acute cardiopulmonary   abnormalities are seen.    --- End of Report ---    < end of copied text >  
NEPHROLOGY CONSULTATION    CHIEF COMPLAINT:  MAXIMILIAN on CKD      HPI:  Came from SNF with concern for sepsis with abdominal pain, hypotension and WBC 36K.  Mckeon was placed with return of 500 mL urine and now urine appears scant hematuric.  Labs reflect rapid worsening of CKD with Cr 2.2 to 6.9 mg/dL.  There has been some small improvement with IVF, pressors and antibiotics.  There was a mild metabolic acidosis with AG 19 which has improved as well on HCO3 drip.       ROS:  c/o abdominal and RLE pain      PAST MEDICAL & SURGICAL HISTORY:  CVA (cerebrovascular accident) with dense left hemiplegia  Hyperlipidemia  Hypothyroidism  History of hysterectomy  LLE DVT  CKD stage 4      SOCIAL HISTORY:  NA    FAMILY HISTORY:  NA      MEDICATIONS  (STANDING):  atorvastatin 80 milliGRAM(s) Oral at bedtime  cefepime   IVPB 500 milliGRAM(s) IV Intermittent daily  chlorhexidine 2% Cloths 1 Application(s) Topical <User Schedule>  dextrose 5%. 1000 milliLiter(s) (100 mL/Hr) IV Continuous <Continuous>  dextrose 5%. 1000 milliLiter(s) (50 mL/Hr) IV Continuous <Continuous>  dextrose 50% Injectable 25 Gram(s) IV Push once  dextrose 50% Injectable 12.5 Gram(s) IV Push once  dextrose 50% Injectable 25 Gram(s) IV Push once  insulin glargine Injectable (LANTUS) 6 Unit(s) SubCutaneous at bedtime  insulin lispro (ADMELOG) corrective regimen sliding scale   SubCutaneous at bedtime  insulin lispro (ADMELOG) corrective regimen sliding scale   SubCutaneous three times a day before meals  mirtazapine 15 milliGRAM(s) Oral at bedtime  pantoprazole    Tablet 40 milliGRAM(s) Oral daily  phenylephrine    Infusion 0.1 MICROgram(s)/kG/Min (2.66 mL/Hr) IV Continuous <Continuous>  sertraline 50 milliGRAM(s) Oral daily  sodium bicarbonate  Infusion 0.265 mEq/kG/Hr (125 mL/Hr) IV Continuous <Continuous>      PHYSICAL EXAMINATION:  T(F): 98.3 (22 @ 11:00)  HR: 109 (22 @ 12:00)  BP: 106/60 (22 @ 12:00)  RR: 19 (22 @ 12:00)  SpO2: 94% (22 @ 12:00)  Conversant, no apparent distress  PERRLA, pink conjunctivae, no ptosis  Good dentition, no pharyngeal erythema  Neck non tender, no mass, no thyromegaly or nodules  Normal respiratory effort, lungs clear to auscultation  Heart with RRR, no murmurs or rubs, no peripheral edema  Abdomen soft, tender in lower quadrants  Skin no rashes, ulcers or lesions, normal turgor and temperature  Appropriate affect, AO x 3    LABS:                        7.3    21.54 )-----------( 258      ( 2022 12:15 )             21.1     06-24    141  |  104  |  99<H>  ----------------------------<  165<H>  3.3<L>   |  25  |  5.45<H>    Ca    6.9<L>      2022 12:15  Phos  4.0     06-24  Mg     1.7     06-24    TPro  5.8<L>  /  Alb  1.9<L>  /  TBili  0.3  /  DBili  x   /  AST  68<H>  /  ALT  27  /  AlkPhos  159<H>  06-24    Lactate 1.3      Urinalysis Basic - ( 2022 15:30 )  Color: Brown / Appearance: very cloudy / S.015 / pH: x  Gluc: x / Ketone: Negative  / Bili: Negative / Urobili: 1   Blood: x / Protein: 500 mg/dL / Nitrite: Positive   Leuk Esterase: Moderate / RBC: >50 /HPF / WBC >50 /HPF   Sq Epi: x / Non Sq Epi: Neg.-Few / Bacteria: Moderate /HPF      RADIOLOGY:  CXR personally reviewed shows hypoinflation and NAPD    CT scan shows no bowel obstruction, small effusions, small left renal mass      ASSESSMENT:  1.  MAXIMILIAN due to septic ATN, oliguric  2.  Sepsis with presumed  source, possibly bacteremic  3.  Metabolic acidosis, non lactic, better    PLAN:  May continue to volume expand and try to wean off pressors  Antibiotics per ID  Monitor BMP every 12 hours  No acute dialytic indications          
JENNIFER REBOLLEDO,  72y Female  MRN: [id number]  ATTENDING: Dr. Joseph Ng      HPI:  72F, nursing home resident, with PMHx CVA/TIA, dementia, depression, dyslipidemia, CDK, DM2, DVT, admitted at [facility] with sepsis; recently on antibiotics for UTI, discharged from [facility] 3 days ago on Eliquis and Ceftin.  While in nursing home patient started to present abdominal discomfort and hypotension.  Found with leukocytosis (WBC> 30K with myeloid shift).  In the emergency room she was screaming in pain.  CT abdomen showed no bowel obstruction, present colonic diverticulosis without diverticulitis, and a 1.7 cm left renal lesion (?  malignancy).  Additionally, patient found coagulopathic (INR>15); hematology consulted on etiology of coagulopathy.    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 1000 milliLiter(s) IV Continuous <Continuous>  dextrose 5%. 1000 milliLiter(s) IV Continuous <Continuous>  dextrose 50% Injectable 25 Gram(s) IV Push once  dextrose 50% Injectable 12.5 Gram(s) IV Push once  dextrose 50% Injectable 25 Gram(s) IV Push once  insulin glargine Injectable (LANTUS) 6 Unit(s) SubCutaneous at bedtime  insulin lispro (ADMELOG) corrective regimen sliding scale   SubCutaneous at bedtime  insulin lispro (ADMELOG) corrective regimen sliding scale   SubCutaneous three times a day before meals  lactated ringers. 1000 milliLiter(s) IV Continuous <Continuous>  midodrine 5 milliGRAM(s) Oral every 8 hours  mirtazapine 15 milliGRAM(s) Oral at bedtime  pantoprazole    Tablet 40 milliGRAM(s) Oral daily  phenylephrine    Infusion 0.1 MICROgram(s)/kG/Min IV Continuous <Continuous>  potassium chloride  10 mEq/100 mL IVPB 10 milliEquivalent(s) IV Intermittent every 1 hour  sertraline 50 milliGRAM(s) Oral daily    ALLERGIES:  No Known Allergies    FAMILY HISTORY:  Reviewed, non-contributory: [x ]     SOCIAL HISTORY:  Tobacco: YES [ ]  ; NO [x ]; Former smoker [ ]  Alcohol:   YES [ ]  ; NO [ x]; Social alcohol user [ ]  Occupation/ marital status/ children: resident NH    REVIEW SYSTEMS:  Cannot obtain    VITALS:  T(C): 36.6, Max: 37.1 (06-24-22 @ 07:00)  T(F): 97.9, Max: 98.7 (06-24-22 @ 07:00)  HR: 99 (83 - 116)  BP: 121/76 (48/24 - 127/61)  SpO2: 91% (91% - 100%)    PHYSICAL EXAM:  Constitutional: alert, appears in pain  HEENT: normocephalic, anicteric sclerae, no mucositis or thrush  Respiratory: bilateral clear to auscultation anteriorly  Cardiovascular : S1, S2 regular, rhythmic, no murmurs, gallops or rubs  Abdomen: soft, distended, + normoactive BS, no palpable HS- megaly  Extremities: no tenderness;  -c/c/e, pulses equal bilaterally  Integumentary: no rashes, scars, or lesions suggestive of malignancy  Neurologic: no gross focal deficits    LABS:  (06-24) WBC: 21.54 K/uL,Hemoglobin: 7.3 g/dL, Hematocrit: 21.1 %,  Platelet: 258 K/uL  (06-24) Na: 141 mmol/L ; K: 3.3 mmol/L ; BUN: 99 mg/dL ; Cr: 5.45 mg/dL.  PT/INR - ( 24 Jun 2022 06:10 )   PT: > 200 sec;   INR: >14.99 ratio    PTT - ( 24 Jun 2022 06:10 )  PTT:91.5 sec    RADIOLOGY:  ACC: [id number] EXAM:  CT ABDOMEN AND PELVIS                          PROCEDURE DATE:  06/23/2022          INTERPRETATION:  CLINICAL INFORMATION: Abdominal pain    COMPARISON: None.    CONTRAST/COMPLICATIONS:  IV Contrast: NONE  Oral Contrast: NONE  Complications: None reported at time of study completion    PROCEDURE:  CT of the Abdomen and Pelvis was performed.  Sagittal and coronal reformats were performed.    FINDINGS: The examination is limited by lack of IV/oral contrast.  LOWER CHEST: Small bilateral pleural effusions. Mild bilateral   atelectasis. Prominence of the central pulmonary arteries, suggestive of   pulmonary arterial hypertension.    LIVER: Within normal limits.  BILE DUCTS: Normal caliber.  GALLBLADDER: Within normal limits.  SPLEEN: Within normal limits.  PANCREAS: Within normal limits.  ADRENALS: Nonspecific mild adrenal thickening bilaterally.  KIDNEYS/URETERS: No evidence for a calculus in the kidneys or ureters. No   hydronephrosis. Bilateral extrarenal pelvises. Apparent 1.7 cm   heterogeneous left renal lesion with Hounsfield unit of 33 (image 53   series 2); if clinically indicated, abdominal MR without and with IV   contrast may be pursued to rule out a solid malignant neoplasm.    BLADDER: Mckeon catheter in the urinary bladder which is collapsed.  REPRODUCTIVE ORGANS: The uterus is not visualized, probably surgically   absent.    BOWEL: No bowel obstruction or grossly thickened bowel wall. Colonic   diverticulosis without evidence of diverticulitis. Appendix within normal   limits. Moderate amount of stool in the rectum.  PERITONEUM: No free air or ascites.  VESSELS: Calcified atherosclerotic disease.  RETROPERITONEUM/LYMPH NODES: No lymphadenopathy.  ABDOMINAL WALL: Small fat-containing umbilical hernia.  BONES: Mild degenerative spondylosis.    IMPRESSION:  No bowel obstruction or grossly thickened bowel wall. Colonic   diverticulosis without evidence of diverticulitis. Appendix within normal   limits. Moderate amount of stool in the rectum.    Small bilateral pleural effusions    Apparent 1.7 cm heterogeneous left renal lesion with Hounsfield unit of   33; if clinically indicated, abdominal MR without and with IV contrast   may be pursued to rule out a solid malignant neoplasm.              
Source:  Patient and chart  Reliability:  Fair    CC:  "I think I'm dying"    HPI  72 year old female with PMH CVA/TIA, dementia, depression, dyslipidemia, DVT, CDK, and type 2 DM who presented to ED today from [facility].  Patient recently discharged from [facility] for TIA, on this admission noted to have new DVT.  Also noted to have hematuria, found to have UTI with mild hydronephrosis on sonogram.  Discharged on PO anticoagulation and Ceftin PO.  Patient apparently developed abdominal pain with associated hypotension at [facility], outpatient CBC revealed leukocytosis to 36.  Hydrated and started on Ceftriaxone and Zosyn, initially improved with moderation of leukocytosis.  Today patient noted to have significantly worsening abdominal pain and was brought to the ED.    In ED hypotensive with leukocytosis, also noted to have MAXIMILIAN with creatinine to 6.91 and metabolic acidosis.  CT abdomen and pelvis demonstrated no hydronephrosis or renal calculi.  Mckeon placed productive of tea colored urine. Initially responded to sepsis fluids but subsequently became hypotensive to 80's again, ICU called for further evaluation and management    On exam patient delirious and screaming in pain, cannot illicit further history.     PMH  CVA  with resulting left hemiparesis  TIA  - admitted to [facility] for evaluation and discharged   Dementia  Depression  DVT diagnosed   Type 2 DM  Dyslipidemia  CKD    PSH  Remote hysterectomy    Allergies  NKDA  No known food or environmental allergies    Home Medications  acetaminophen 325 mg oral tablet: 2 tab(s) orally every 8 hours, As Needed (2022 15:21)  Anusol-HC 2.5% rectal cream with applicator: 1 application rectal once a day (at bedtime) (2022 15:21)  apixaban 5 mg oral tablet: 1 tab(s) orally 2 times a day (2022 10:54)  Artificial Tears ophthalmic solution: 1 drop(s) to each affected eye 2 times a day (2022 15:21)  atorvastatin 80 mg oral tablet: 1 tab(s) orally once a day (at bedtime) (2022 10:54)  cefuroxime 250 mg oral tablet: 1 tab(s) orally every 12 hours until  (2022 14:12)  Colace 100 mg oral capsule: 2 cap(s) orally once a day (at bedtime) (2022 15:21)  insulin glargine 100 units/mL subcutaneous solution: 6 unit(s) subcutaneous once a day (at bedtime) (2022 10:54)  lactobacillus acidophilus oral tablet: 1 tab(s) orally once a day (2022 15:21)  Melatonin 3 mg oral tablet: 1 tab(s) orally once a day (at bedtime) (2022 15:21)  mirtazapine 15 mg oral tablet: 1 tab(s) orally once a day (at bedtime) (2022 15:21)  Multiple Vitamins oral tablet: 1 tab(s) orally once a day (2022 15:21)  pantoprazole 40 mg oral delayed release tablet: 1 tab(s) orally once a day (before a meal) (2022 10:54)  Senna 8.6 mg oral tablet: 2 tab(s) orally once a day (at bedtime), As Needed (2022 15:21)  sertraline 50 mg oral tablet: 1 tab(s) orally once a day (2022 15:21)  traMADol 50 mg oral tablet: 0.5 tab(s) orally every 6 hours, As needed, Moderate Pain (4 - 6) (2022 10:54)    Current Medications  apixaban 5 milliGRAM(s) Oral every 12 hours  atorvastatin 80 milliGRAM(s) Oral at bedtime  insulin glargine Injectable (LANTUS) 6 Unit(s) SubCutaneous at bedtime  insulin lispro (ADMELOG) corrective regimen sliding scale   SubCutaneous three times a day before meals  insulin lispro (ADMELOG) corrective regimen sliding scale   SubCutaneous at bedtime  mirtazapine 15 milliGRAM(s) Oral at bedtime  piperacillin/tazobactam IVPB.. 3.375 Gram(s) IV Intermittent every 12 hours  sertraline 50 milliGRAM(s) Oral daily  sodium chloride 0.9%. 1000 milliLiter(s) (150 mL/Hr) IV Continuous <Continuous>  acetaminophen     Tablet .. 650 milliGRAM(s) Oral every 6 hours PRN Temp greater or equal to 38C (100.4F), Mild Pain (1 - 3)  dextrose Oral Gel 15 Gram(s) Oral once PRN Blood Glucose LESS THAN 70 milliGRAM(s)/deciliter  traMADol 25 milliGRAM(s) Oral every 12 hours PRN Moderate Pain (4 - 6)    Psoc  From documentation:  1/2 PPD since age 17 and quit 1 year ago, +hx of soc ETOH, denied any illicit drug use.    Pfam  FH: CAD (coronary artery disease)  Family history of CVA  FH: diabetes mellitus    Review of Systems  Cannot elicit at this time    Labs                        8.0    30.03 )-----------( 355      ( 2022 12:19 )             24.3     139  |  103  |  116<H>  ----------------------------<  103<H>  3.7   |  17<L>  |  6.91<H>    Ca    7.5<L>      2022 12:19  TPro  7.1  /  Alb  2.0<L>  /  TBili  0.4  /  DBili  x   /  AST  81<H>  /  ALT  32  /  AlkPhos  126<H>      Lactic Acid Trend  22 @ 12:19   -   1.3    Urinalysis Basic - ( 2022 15:30 )    Color: Brown / Appearance: very cloudy / S.015 / pH: x  Gluc: x / Ketone: Negative  / Bili: Negative / Urobili: 1   Blood: x / Protein: 500 mg/dL / Nitrite: Positive   Leuk Esterase: Moderate / RBC: >50 /HPF / WBC >50 /HPF   Sq Epi: x / Non Sq Epi: Neg.-Few / Bacteria: Moderate /HPF    Blood Gas Profile - Venous (22 @ 17:23)   pH, Venous: 7.23   pCO2, Venous: 33 mmHg   pO2, Venous: 43 mmHg   HCO3, Venous: 14 mmol/L   Base Excess, Venous: -13.8 mmol/L   Oxygen Saturation, Venous: 72.2 %   Total CO2, Venous: 15 mmol/L   Blood Gas Comments, Venous: Specimen collected at 17:20 hrs   Blood Gas Source Venous: Venous     CARDIAC MARKERS ( 2022 12:19 )  x     / x     / 946 U/L / x     / x        COVID  22 @ 18:10  COVID -   [facility]    Radiology    ACC: [id number] EXAM:  XR CHEST PORTABLE IMMED 1V                        PROCEDURE DATE:  2022    INTERPRETATION:  INDICATION: Sepsis  COMPARISON: 2022  FINDINGS:  A portable view of the chest shows a shallow inspiratory efforthowever   the visualized lungs are clear. No infiltrates are seen. There are no   effusions. There is no hilar or mediastinal lesion is seen when   accounting for the inspiration and technique. The heart does not appear   enlarged and there is no CHF. The bony thorax is grossly intact.  IMPRESSION:  1. Shallow inspiratory effort however no acute cardiopulmonary   abnormalities are seen.      ACC: [id number] EXAM:  CT ABDOMEN AND PELVIS                        PROCEDURE DATE:  2022    INTERPRETATION:  CLINICAL INFORMATION: Abdominal pain  COMPARISON: None.  CONTRAST/COMPLICATIONS:  IV Contrast: NONE  Oral Contrast: NONE  Complications: None reported at time of study completion    PROCEDURE:  CT of the Abdomen and Pelvis was performed.  Sagittal and coronal reformats were performed.    FINDINGS: The examination is limited by lack of IV/oral contrast.  LOWER CHEST: Small bilateral pleural effusions. Mild bilateral   atelectasis. Prominence of the central pulmonary arteries, suggestive of   pulmonary arterial hypertension.    LIVER: Within normal limits.  BILE DUCTS: Normal caliber.  GALLBLADDER: Within normal limits.  SPLEEN: Within normal limits.  PANCREAS: Within normal limits.  ADRENALS: Nonspecific mild adrenal thickening bilaterally.  KIDNEYS/URETERS: No evidence for a calculus in the kidneys or ureters. No   hydronephrosis. Bilateral extrarenal pelvises. Apparent 1.7 cm   heterogeneous left renal lesion with Hounsfield unit of 33 (image 53   series 2); if clinically indicated, abdominal MR without and with IV   contrast may be pursued to rule out a solid malignant neoplasm.    BLADDER: Mckeon catheter in the urinary bladder which is collapsed.  REPRODUCTIVE ORGANS: The uterus is not visualized, probably surgically   absent.    BOWEL: No bowel obstruction or grossly thickened bowel wall. Colonic   diverticulosis without evidence of diverticulitis. Appendix within normal   limits. Moderate amount of stool in the rectum.  PERITONEUM: No free air or ascites.  VESSELS: Calcified atherosclerotic disease.  RETROPERITONEUM/LYMPH NODES: No lymphadenopathy.  ABDOMINAL WALL: Small fat-containing umbilical hernia.  BONES: Mild degenerative spondylosis.    IMPRESSION:  No bowel obstruction or grossly thickened bowel wall. Colonic   diverticulosis without evidence of diverticulitis. Appendix within normal   limits. Moderate amount of stool in the rectum.    Small bilateral pleural effusions    Apparent 1.7 cm heterogeneous left renal lesion with Hounsfield unit of   33; if clinically indicated, abdominal MR without and with IV contrast   may be pursued to rule out a solid malignant neoplasm.    JUAN CASTANEDA MD; Attending Radiologist  This document has been electronically signed. 2022  2:50PM    Vital Signs   T(C): 37.1 (2022 12:03), Max: 37.1 (2022 12:03)  T(F): 98.8 (2022 12:03), Max: 98.8 (2022 12:03)  HR: 121 (2022 11:46) (121 - 121)  BP: 87/57 (2022 11:46) (87/57 - 87/57)    RR: 19 (2022 11:46) (19 - 19)  SpO2: 96% RA (2022 11:46) (96% - 96%)    Physical Exam  Gen:  WN/WD Female resting in bed, screaming in discomfort  ENT:  NC/AT, no JVD noted  Thorax:  Symmetric, no retractions  Lung:  CTA b/l  CV:  S1, S2. RRR  Abd:  Soft, tender throughout to palp, no rebound.  BS normoactive, no masses to palp.  No CVA tenderness  Extrem:  No C/C/E, DP/radial pulses +2  Neuro:  LUE contracted, strength 5/5 on right  Psych:  Awake, alert and agitated

## 2022-06-25 NOTE — PROGRESS NOTE ADULT - SUBJECTIVE AND OBJECTIVE BOX
CC: f/u for sepsis, ?  origin    Patient reports: she is more alert, still very weak    REVIEW OF SYSTEMS:  All other review of systems negative (Comprehensive ROS): left arm  contracted,  with rodriguez, off pressers    Antimicrobials Day #  :day 3  cefepime   IVPB 500 milliGRAM(s) IV Intermittent daily    Other Medications Reviewed  MEDICATIONS  (STANDING):  atorvastatin 80 milliGRAM(s) Oral at bedtime  cefepime   IVPB 500 milliGRAM(s) IV Intermittent daily  chlorhexidine 2% Cloths 1 Application(s) Topical <User Schedule>  dextrose 5% + lactated ringers. 1000 milliLiter(s) (100 mL/Hr) IV Continuous <Continuous>  dextrose 5%. 1000 milliLiter(s) (100 mL/Hr) IV Continuous <Continuous>  dextrose 5%. 1000 milliLiter(s) (50 mL/Hr) IV Continuous <Continuous>  dextrose 50% Injectable 25 Gram(s) IV Push once  dextrose 50% Injectable 12.5 Gram(s) IV Push once  dextrose 50% Injectable 25 Gram(s) IV Push once  insulin glargine Injectable (LANTUS) 6 Unit(s) SubCutaneous at bedtime  insulin lispro (ADMELOG) corrective regimen sliding scale   SubCutaneous at bedtime  insulin lispro (ADMELOG) corrective regimen sliding scale   SubCutaneous three times a day before meals  magnesium sulfate  IVPB 2 Gram(s) IV Intermittent every 2 hours  midodrine 5 milliGRAM(s) Oral every 8 hours  mirtazapine 15 milliGRAM(s) Oral at bedtime  pantoprazole    Tablet 40 milliGRAM(s) Oral daily  phenylephrine    Infusion 0.1 MICROgram(s)/kG/Min (2.66 mL/Hr) IV Continuous <Continuous>  potassium chloride  10 mEq/100 mL IVPB 10 milliEquivalent(s) IV Intermittent every 1 hour  sertraline 50 milliGRAM(s) Oral daily    T(F): 97.6 (22 @ 05:00), Max: 98.4 (22 @ 22:00)  HR: 81 (22 @ 10:00)  BP: 108/60 (22 @ 10:00)  RR: 17 (22 @ 10:00)  SpO2: 97% (22 @ 10:00)  Wt(kg): --    PHYSICAL EXAM:  General: alert, no acute distress  Eyes:  anicteric, no conjunctival injection, no discharge  Oropharynx: no lesions or injection 	  Neck: supple, without adenopathy  Lungs: clear to auscultation, decreased at bases  Heart: regular rate and rhythm; no murmur, rubs or gallops  Abdomen: soft, nondistended, nontender, without mass or organomegaly  Skin: no lesions  Extremities: no clubbing, cyanosis, or edema  Neurologic: alert, oriented, left side is weak  : rodriguez  LAB RESULTS:                        7.2    20.84 )-----------( 418      ( 2022 05:30 )             21.8         144  |  105  |  83<H>  ----------------------------<  63<L>  2.9<LL>   |  29  |  4.85<H>    Ca    7.0<L>      2022 08:20  Phos  3.5       Mg     1.3         TPro  5.5<L>  /  Alb  1.5<L>  /  TBili  0.4  /  DBili  x   /  AST  61<H>  /  ALT  26  /  AlkPhos  172<H>      LIVER FUNCTIONS - ( 2022 08:20 )  Alb: 1.5 g/dL / Pro: 5.5 g/dL / ALK PHOS: 172 U/L / ALT: 26 U/L / AST: 61 U/L / GGT: x           Urinalysis Basic - ( 2022 15:30 )    Color: Brown / Appearance: very cloudy / S.015 / pH: x  Gluc: x / Ketone: Negative  / Bili: Negative / Urobili: 1   Blood: x / Protein: 500 mg/dL / Nitrite: Positive   Leuk Esterase: Moderate / RBC: >50 /HPF / WBC >50 /HPF   Sq Epi: x / Non Sq Epi: Neg.-Few / Bacteria: Moderate /HPF      MICROBIOLOGY:  RECENT CULTURES:   @ 15:33 Clean Catch Clean Catch (Midstream)     >=3 organisms. Probable collection contamination.       @ 12:21 .Blood Blood-Peripheral     No growth to date.          RADIOLOGY REVIEWED:    < from: CT Abdomen and Pelvis No Cont (06.23.22 @ 14:09) >  IMPRESSION:  No bowel obstruction or grossly thickened bowel wall. Colonic   diverticulosis without evidence of diverticulitis. Appendix within normal   limits. Moderate amount of stool in the rectum.    Small bilateral pleural effusions    Apparent 1.7 cm heterogeneous left renal lesion with Hounsfield unit of   33; if clinically indicated, abdominal MR without and with IV contrast   may be pursued to rule out a solid malignant neoplasm.    < end of copied text >

## 2022-06-25 NOTE — CONSULT NOTE ADULT - ASSESSMENT
72 year old female with PMH CVA/TIA, dementia, depression, dyslipidemia, DVT, CDK, and type 2 DM presenting with sepsis likely urinary source, MAXIMILIAN with associated metabolic acidosis
urinary retention, sepsis
Patient is a 72y female with a past history of a remote CVA with left sided weakness, HTN,HLD,DM,hypothyroidism, CKD and recent 5 day [facility] admission for transient left sided neurological signs, snd out on ceftin for E Coli  UTI, who was admitted 6/23 with hypotension and an MAXIMILIAN.  Her creat had increased from 2.2 to 6.7, her wbc had increased to over 30,000.  She reportedly was not feeling well for a few days at , had abdominal pain, had received CTX and zosyn, was sent to ER 6/23 for evaluation.  She had rodriguez placed, over 500 cc urine, received fluids for BP in the 80's and antibiotics continued.She appears somewhat slow in her responses, has nonspecific abdominal discomfort.She is presently on cefepime.She has also received vanco and zosyn.  Urosepsis, MAXIMILIAN, and urinary retention may be the unifying diagnosis.  It is hard to comprehend how she became so ill in such a short period of time.  Her admission cultures here may be impacted by SNF antibiotics.  Suggest:  1.Hydrate  2, Cefepime renally dosed, will try to avoid nephrotixins  3.Await blood and urine cultures  4.Favor renal f/u  5.Monitor wbc count and creatinine  6. Additional w.u as indicated, does not appear to have acute GI pathology

## 2022-06-25 NOTE — PROVIDER CONTACT NOTE (HYPOGLYCEMIA EVENT) - NS PROVIDER CONTACT BACKGROUND-HYPO
Age: 72y    Gender: Female    POCT Blood Glucose:  66 mg/dL (06-25-22 @ 07:59)  62 mg/dL (06-25-22 @ 07:56)  107 mg/dL (06-24-22 @ 21:37)  191 mg/dL (06-24-22 @ 17:25)  193 mg/dL (06-24-22 @ 15:11)  172 mg/dL (06-24-22 @ 11:36)      eMAR:atorvastatin   80 milliGRAM(s) Oral (06-24-22 @ 21:19)    insulin glargine Injectable (LANTUS)   6 Unit(s) SubCutaneous (06-24-22 @ 22:10)    insulin lispro (ADMELOG) corrective regimen sliding scale   1 Unit(s) SubCutaneous (06-24-22 @ 17:32)   1 Unit(s) SubCutaneous (06-24-22 @ 15:12)      Breakfast blood glucose taken 62; repeated at 66  Hypoglycemia protocol initiated, PA made aware of same

## 2022-06-25 NOTE — SWALLOW BEDSIDE ASSESSMENT ADULT - SLP PERTINENT HISTORY OF CURRENT PROBLEM
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 Pt was discharged from [facility] to  3 days ago s/p transient L facial droop, CT Head negative for acute findings, hospital course complicated by LLE DVT started on heparin gtt due to renal dysfunction with further complication of hematuria due to likely UTI. Pt was transitioned to [facility] and discharged to Rehab with Ceftin to complete course of abx. While at Rehab, pt noted to have abdominal discomfort, hypotensive, and with leukocytosis of 36 on bloodwork. She was started on IVF + Ceftriaxone + Zosyn. Repeat bloodwork with improve WBC to 27. Pt today, however, noted to have significant abdominal pain and discomfort, screaming in pain. Her provider requested pt to be transferred to the ED for higher level of care. 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 Pt was discharged from [facility] to  3 days ago s/p transient L facial droop, CT Head negative for acute findings, hospital course complicated by LLE DVT started on heparin gtt due to renal dysfunction with further complication of hematuria due to likely UTI. Pt was transitioned to [facility] and discharged to Rehab with Ceftin to complete course of abx. While at Rehab, pt noted to have abdominal discomfort, hypotensive, and with leukocytosis of 36 on bloodwork. She was started on IVF + Ceftriaxone + Zosyn. Repeat bloodwork with improve WBC to 27. Seen by this discipline on 6/14/22 with recommendation of regular foods and thin liquids.

## 2022-06-25 NOTE — PROGRESS NOTE ADULT - ASSESSMENT
Physical Examination:  GENERAL:               Alert, Oriented, No acute distress.    HEENT:                    No JVD, Dry MM  PULM:                     Bilateral air entry, Clear to auscultation bilaterally, no significant sputum production, No Rales, No Rhonchi, No Wheezing  CVS:                         S1, S2,  +Murmur  ABD:                        Soft, nondistended, nontender, normoactive bowel sounds,   EXT:                         mild edema, nontender, No Cyanosis or Clubbing   Vascular:                Warm Extremities, Normal Capillary refill, Normal Distal Pulses  SKIN:                       Warm and well perfused, no rashes noted.   NEURO:                  Alert, oriented, interactive, LHP , follows commands  PSYC:                      Calm, +Insight.    Assessment  Septic shock with UTI    possible urinary retention  MAXIMILIAN On CKD with baseline around Cr 2.5 Imporoving  DVT on eliquis  Elevated INR s/p VIt K , ? from sepsis  Abnormal CT: No evidence for a calculus in the kidneys or ureters. No hydronephrosis. Bilateral extrarenal pelvises. Apparent 1.7 cm heterogeneous left renal lesion with Hounsfield unit of 33 (image 53 series 2); if clinically indicated, abdominal MR without and with IV contrast may be pursued to rule out a solid malignant neoplasm.  Underlying Dementia, Depression, DM2 on insulin, h/o CVA with Left HP, CKD          Plan    Pressors to maintain BP titrate to maintain MAP > 65  Empiric antibiotics  Antibiotics as per ID   Urology - " rodriguez draining, ? may best be managed with chronic rodriguez for recurrent retention s/p CVA"  Apprecieate Renal and heme onc input  will plan to restart eliquis in am   as  hematuria  improved today    D5 LR     f/u cultures  may need MRI when stable to evaluate  1.7 cm heterogeneous left renal lesion   Supplement and monitor lytes    PMD:				                   Notified(Date):  Family Updated: 	Jalyn [phone]	                                 Date: 6/24/22      Sedation & Analgesia:	  Diet/Nutrition:		   Diet, Consistent Carbohydrate/No Snacks:   Soft and Bite Sized (SOFTBTSZ)  Supplement Feeding Modality:  Oral  Nepro Cans or Servings Per Day:  1       Frequency:  Three Times a day (06-24-22 @ 08:59) [Pending Verification By Attending]        GI PPx:			pantoprazole    Tablet 40 milliGRAM(s) Oral daily    DVT Ppx:		On hold    	RISK                                                          Points  	[ x] Previous VTE                                           	3  	[ ] Thrombophilia                                        	2  	[ x] Lower limb paralysis                              	2   	[ ] Current Cancer                                       	2   	[ ] Immobilization > 24 hrs                        	1  	[ ] ICU/CCU stay > 24 hours                       	1  	[x ] Age > 60                                                   	1  		Total:[6 ]      Activity:		    Head of Bed:               35-45 Deg  Glycemic Control:          albumin human 25% IVPB 100 milliLiter(s) IV Intermittent once  atorvastatin 80 milliGRAM(s) Oral at bedtime  dextrose 5%. 1000 milliLiter(s) IV Continuous <Continuous>  dextrose 5%. 1000 milliLiter(s) IV Continuous <Continuous>  dextrose 50% Injectable 25 Gram(s) IV Push once  dextrose 50% Injectable 12.5 Gram(s) IV Push once  dextrose 50% Injectable 25 Gram(s) IV Push once  insulin glargine Injectable (LANTUS) 6 Unit(s) SubCutaneous at bedtime  insulin lispro (ADMELOG) corrective regimen sliding scale   SubCutaneous at bedtime  insulin lispro (ADMELOG) corrective regimen sliding scale   SubCutaneous three times a day before meals  sodium bicarbonate  Infusion 0.265 mEq/kG/Hr IV Continuous <Continuous>      Lines: PIV  CENTRAL LINE: 	[ ] YES [ ] NO	                    LOCATION:   	                       DATE INSERTED:   	                    REMOVE:  [ ] YES [ ] NO    A-LINE:  	                [ ] YES [ ] NO                      LOCATION:   	                       DATE INSERTED: 		            REMOVE:  [ ] YES [ ] NO    RODRIGUEZ: 		        [ x] YES [ ] NO  		                                       DATE INSERTED:		            REMOVE:  [ ] YES [ x] NO   // suspect retention    Restraints were deemed necessary to prevent removal of life-sustaining devices [  ] YES   [  x  ]  NO    Disposition: Transfer to medical floor    Goals of Care:  DNR/I , molst completed by palliative Physical Examination:  GENERAL:               Alert, Oriented, No acute distress.    HEENT:                    No JVD, Dry MM  PULM:                     Bilateral air entry, Clear to auscultation bilaterally, no significant sputum production, No Rales, No Rhonchi, No Wheezing  CVS:                         S1, S2,  +Murmur  ABD:                        Soft, nondistended, nontender, normoactive bowel sounds,   EXT:                         mild edema, nontender, No Cyanosis or Clubbing   Vascular:                Warm Extremities, Normal Capillary refill, Normal Distal Pulses  SKIN:                       Warm and well perfused, no rashes noted.   NEURO:                  Alert, oriented, interactive, LHP , follows commands  PSYC:                      Calm, +Insight.    Assessment  Septic shock with UTI    possible urinary retention  MAXIMILIAN On CKD with baseline around Cr 2.5 Imporoving  DVT on eliquis  Elevated INR s/p VIt K , ? from sepsis  Abnormal CT: No evidence for a calculus in the kidneys or ureters. No hydronephrosis. Bilateral extrarenal pelvises. Apparent 1.7 cm heterogeneous left renal lesion with Hounsfield unit of 33 (image 53 series 2); if clinically indicated, abdominal MR without and with IV contrast may be pursued to rule out a solid malignant neoplasm.  Underlying Dementia, Depression, DM2 on insulin, h/o CVA with Left HP, CKD          Plan  RN states dysphagia, will get speech and swallow and give conservative diet  D5 LR for hypoglycemia    Pressors to maintain BP titrate to maintain MAP > 65  Empiric antibiotics  Antibiotics as per ID   Urology - " rodriguez draining, ? may best be managed with chronic rodriguez for recurrent retention s/p CVA"  Apprecieate Renal and heme onc input  will plan to restart eliquis in am   as  hematuria  improved today    D5 LR     f/u cultures  may need MRI when stable to evaluate  1.7 cm heterogeneous left renal lesion   Supplement and monitor lytes    PMD:				                   Notified(Date):  Family Updated: 	Jalyn [phone]	                                 Date: 6/24/22      Sedation & Analgesia:	  Diet/Nutrition:		   Diet, Consistent Carbohydrate/No Snacks:   Soft and Bite Sized (SOFTBTSZ)  Supplement Feeding Modality:  Oral  Nepro Cans or Servings Per Day:  1       Frequency:  Three Times a day (06-24-22 @ 08:59) [Pending Verification By Attending]        GI PPx:			pantoprazole    Tablet 40 milliGRAM(s) Oral daily    DVT Ppx:		On hold    	RISK                                                          Points  	[ x] Previous VTE                                           	3  	[ ] Thrombophilia                                        	2  	[ x] Lower limb paralysis                              	2   	[ ] Current Cancer                                       	2   	[ ] Immobilization > 24 hrs                        	1  	[ ] ICU/CCU stay > 24 hours                       	1  	[x ] Age > 60                                                   	1  		Total:[6 ]      Activity:		    Head of Bed:               35-45 Deg  Glycemic Control:          albumin human 25% IVPB 100 milliLiter(s) IV Intermittent once  atorvastatin 80 milliGRAM(s) Oral at bedtime  dextrose 5%. 1000 milliLiter(s) IV Continuous <Continuous>  dextrose 5%. 1000 milliLiter(s) IV Continuous <Continuous>  dextrose 50% Injectable 25 Gram(s) IV Push once  dextrose 50% Injectable 12.5 Gram(s) IV Push once  dextrose 50% Injectable 25 Gram(s) IV Push once  insulin glargine Injectable (LANTUS) 6 Unit(s) SubCutaneous at bedtime  insulin lispro (ADMELOG) corrective regimen sliding scale   SubCutaneous at bedtime  insulin lispro (ADMELOG) corrective regimen sliding scale   SubCutaneous three times a day before meals  sodium bicarbonate  Infusion 0.265 mEq/kG/Hr IV Continuous <Continuous>      Lines: PIV  CENTRAL LINE: 	[ ] YES [ ] NO	                    LOCATION:   	                       DATE INSERTED:   	                    REMOVE:  [ ] YES [ ] NO    A-LINE:  	                [ ] YES [ ] NO                      LOCATION:   	                       DATE INSERTED: 		            REMOVE:  [ ] YES [ ] NO    RODRIGUEZ: 		        [ x] YES [ ] NO  		                                       DATE INSERTED:		            REMOVE:  [ ] YES [ x] NO   // suspect retention    Restraints were deemed necessary to prevent removal of life-sustaining devices [  ] YES   [  x  ]  NO    Disposition: Transfer to medical floor    Goals of Care:  DNR/I , molst completed by palliative

## 2022-06-25 NOTE — SWALLOW BEDSIDE ASSESSMENT ADULT - SLP GENERAL OBSERVATIONS
Patient alert and oriented to self, place, and year. Patient able to follow directives for the purpose of this assessment.

## 2022-06-25 NOTE — CONSULT NOTE ADULT - PROBLEM SELECTOR RECOMMENDATION 9
rodriguez draining, ? may best be managed with chronic rodriguez for recurrent retention s/p CVA    sonogram and CT reviewed. non-obstructing left kidney stones, bilateral fullness of renal pelves, left kidney mass vs. hyperdense cyst    antibiotics as per ID

## 2022-06-25 NOTE — PROGRESS NOTE ADULT - ASSESSMENT
72F hx of CVA with dense L hemiparesis since 2003, HTN, HLD, hypothyroid, CKD3, T2DM on insulin, DVT on Eliquis, depression admitted for    1. Septic shock 2/2 UTI, resolved  2. Anemia  3. MAXIMILIAN on CKD, most likely pre renal, resolving  4. Anion Gap Metabolic Acidosis, resolved  5. Hematuria, improving  6. DVT on Eliquis  7. Supra-therapeutic INR, resolved    Neuro:  - No Active issues  - Avoid Neuro Deliriogenic / sedative medications  - Aspiration Precautions, HOB > 30 degrees  - Remeron, Zoloft  - Tramadol for pain    CV:  - No active issues, Normotensive  - Avoid fluid overload  - Continue Midodrine 5mg Q8H  - D5LR  - Statin    Pulm:  - Supplemental oxygen as needed to maintain SpO2 > 92%,  - Incentive spirometry    GI:  - Continue Protonix 40mg Daily  - Passed SLP, on pureed diet    Renal:  - Even to net negative fluid balance as tolerated by hemodynamics and renal fx.    - MAXIMILIAN on CKD improving Continue to monitor Bun/Cr and UOP  - Replacing electrolytes as needed with Goal K> 4, PO> 3, Mg> 2               - Strict I&O's  - Avoid Nephro toxic medication  - Renally dose meds  - Urology following  - Hematuria improving    Heme:  - Hold Eliquis will restart in am if hematuria has improved  - Hgb 7.2 continue to trend cbc    ID:  - WBC 20.84,  remains afebrile with no antipyretics administered   - Continue ABX, ID following  - Microbiology and Radiology reviewed   - trend CBC with diff, CMP  and fever curve    Endo:  - ISS for aggressive glycemic control to limit FS glucose to < 180mg/dl.    Non Critical Care Time:  20 minutes were spent assessing the patient's presenting problems of acute illness that pose a high probability of life threatening  deterioration or end organ damage / dysfunction.  Medical desicion making includes initiation / continuation of plan or care review data/ labwork/ radiographic study, direct patient care bedside ,  discussions with  consultants regarding care,  evaluation and interpretation of vital signs, any necessary ventilator management,   NIV or BIPAP changes  or initiation,    discussions with multidisipliary team,  am or pm rounds, discussions of goals of care with patient and family all non-inclusive of procedures.    Plan discussed with Dr Ng 72F hx of CVA with dense L hemiparesis since 2003, HTN, HLD, hypothyroid, CKD3, T2DM on insulin, DVT on Eliquis, depression admitted for    1. Septic shock 2/2 UTI, resolved  2. Acute Blood Loss Anemia  3. MAXIMILIAN on CKD, most likely pre renal, resolving  4. Anion Gap Metabolic Acidosis, resolved  5. Hematuria, improving  6. DVT on Eliquis  7. Supra-therapeutic INR, resolved    Neuro:  - No Active issues  - Avoid Neuro Deliriogenic / sedative medications  - Aspiration Precautions, HOB > 30 degrees  - Remeron, Zoloft  - Tramadol for pain    CV:  - No active issues, Normotensive  - Avoid fluid overload  - Continue Midodrine 5mg Q8H  - D5LR  - Statin    Pulm:  - Supplemental oxygen as needed to maintain SpO2 > 92%,  - Incentive spirometry    GI:  - Continue Protonix 40mg Daily  - Passed SLP, on pureed diet    Renal:  - Even to net negative fluid balance as tolerated by hemodynamics and renal fx.    - MAXIMILIAN on CKD improving Continue to monitor Bun/Cr and UOP  - Replacing electrolytes as needed with Goal K> 4, PO> 3, Mg> 2               - Strict I&O's  - Avoid Nephro toxic medication  - Renally dose meds  - Urology following  - Hematuria improving    Heme:  - Hold Eliquis will restart in am if hematuria has improved  - Hgb 7.2 continue to trend cbc    ID:  - WBC 20.84,  remains afebrile with no antipyretics administered   - Continue ABX, ID following  - Microbiology and Radiology reviewed   - trend CBC with diff, CMP  and fever curve    Endo:  - ISS for aggressive glycemic control to limit FS glucose to < 180mg/dl.    Non Critical Care Time:  20 minutes were spent assessing the patient's presenting problems of acute illness that pose a high probability of life threatening  deterioration or end organ damage / dysfunction.  Medical desicion making includes initiation / continuation of plan or care review data/ labwork/ radiographic study, direct patient care bedside ,  discussions with  consultants regarding care,  evaluation and interpretation of vital signs, any necessary ventilator management,   NIV or BIPAP changes  or initiation,    discussions with multidisipliary team,  am or pm rounds, discussions of goals of care with patient and family all non-inclusive of procedures.    Plan discussed with Dr Ng

## 2022-06-25 NOTE — PROGRESS NOTE ADULT - SUBJECTIVE AND OBJECTIVE BOX
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  EYES: No eye pain, visual disturbances, or discharge.  ENMT:  No difficulty hearing, tinnitus, or vertigo. No sinus or throat pain.  NECK: No pain or stiffness.  RESPIRATORY: No shortness of breath, cough, or wheezing.  CARDIOVASCULAR: No chest pain, palpitations, dizziness, or leg swelling.  GASTROINTESTINAL: No abdominal or epigastric pain. No nausea, vomiting, diarrhea, or constipation. No hematemesis, melena, or hematochezia.  GENITOURINARY: No dysuria, increased frequency, hematuria, or incontinence.  NEUROLOGICAL: No headaches, memory loss, loss of strength, numbness, or tremors.  SKIN: No itching, burning, rashes, or lesions.  MUSCULOSKELETAL: No joint pain or swelling. No muscle, back, or extremity pain.  PSYCHIATRIC: No depression, anxiety, mood swings, or difficulty sleeping.    Medications:  cefepime   IVPB 500 milliGRAM(s) IV Intermittent daily    midodrine 5 milliGRAM(s) Oral every 8 hours  phenylephrine    Infusion 0.1 MICROgram(s)/kG/Min IV Continuous <Continuous>      acetaminophen     Tablet .. 650 milliGRAM(s) Oral every 6 hours PRN  mirtazapine 15 milliGRAM(s) Oral at bedtime  sertraline 50 milliGRAM(s) Oral daily  traMADol 25 milliGRAM(s) Oral every 12 hours PRN        pantoprazole    Tablet 40 milliGRAM(s) Oral daily      atorvastatin 80 milliGRAM(s) Oral at bedtime  dextrose 50% Injectable 25 Gram(s) IV Push once  dextrose 50% Injectable 12.5 Gram(s) IV Push once  dextrose 50% Injectable 25 Gram(s) IV Push once  insulin glargine Injectable (LANTUS) 6 Unit(s) SubCutaneous at bedtime  insulin lispro (ADMELOG) corrective regimen sliding scale   SubCutaneous at bedtime  insulin lispro (ADMELOG) corrective regimen sliding scale   SubCutaneous three times a day before meals    dextrose 5% + lactated ringers. 1000 milliLiter(s) IV Continuous <Continuous>  dextrose 5%. 1000 milliLiter(s) IV Continuous <Continuous>  dextrose 5%. 1000 milliLiter(s) IV Continuous <Continuous>      chlorhexidine 2% Cloths 1 Application(s) Topical <User Schedule>            ICU Vital Signs Last 24 Hrs  T(C): 36.5 (25 Jun 2022 16:00), Max: 36.9 (24 Jun 2022 22:00)  T(F): 97.7 (25 Jun 2022 16:00), Max: 98.4 (24 Jun 2022 22:00)  HR: 85 (25 Jun 2022 19:00) (73 - 126)  BP: 144/61 (25 Jun 2022 19:00) (90/49 - 159/86)  BP(mean): 86 (25 Jun 2022 19:00) (61 - 106)  ABP: --  ABP(mean): --  RR: 13 (25 Jun 2022 19:00) (10 - 23)  SpO2: 98% (25 Jun 2022 19:00) (91% - 99%)          I&O's Detail    24 Jun 2022 07:01  -  25 Jun 2022 07:00  --------------------------------------------------------  IN:    Albumin 25%  -  50 mL: 100 mL    IV PiggyBack: 50 mL    IV PiggyBack: 400 mL    Lactated Ringers: 1100 mL    Oral Fluid: 120 mL    Phenylephrine: 25 mL    Sodium Bicarbonate: 1000 mL  Total IN: 2795 mL    OUT:    Indwelling Catheter - Urethral (mL): 1775 mL  Total OUT: 1775 mL    Total NET: 1020 mL      25 Jun 2022 07:01  -  25 Jun 2022 21:14  --------------------------------------------------------  IN:    dextrose 5% + lactated ringers: 900 mL    Lactated Ringers: 200 mL  Total IN: 1100 mL    OUT:    Indwelling Catheter - Urethral (mL): 1100 mL  Total OUT: 1100 mL    Total NET: 0 mL          LABS:                        7.2    20.84 )-----------( 418      ( 25 Jun 2022 05:30 )             21.8     06-25    144  |  106  |  85<H>  ----------------------------<  153<H>  3.9   |  29  |  4.57<H>    Ca    7.8<L>      25 Jun 2022 17:15  Phos  3.3     06-25  Mg     2.7     06-25    TPro  6.1  /  Alb  1.7<L>  /  TBili  0.4  /  DBili  x   /  AST  73<H>  /  ALT  31  /  AlkPhos  208<H>  06-25          CAPILLARY BLOOD GLUCOSE      POCT Blood Glucose.: 173 mg/dL (25 Jun 2022 17:39)    PT/INR - ( 25 Jun 2022 05:30 )   PT: 40.1 sec;   INR: 3.41 ratio         PTT - ( 25 Jun 2022 05:30 )  PTT:45.4 sec    CULTURES:  Culture Results:   >=3 organisms. Probable collection contamination. (06-23 @ 15:33)  Rapid RVP Result: NotDetec (06-23 @ 12:21)  Culture Results:   No growth to date. (06-23 @ 12:21)  Culture Results:   No growth to date. (06-23 @ 12:21)      Physical Examination:    VITALS AT TIME OF EXAM:  HR:  BP:  RR:  SPO2:    General: Well appearing, lying in bed in NAD.      HEENT: Pupils equal, reactive to light. Symmetric. No scleral icterus or injection.    PULM: Clear to auscultation B/L. No wheezes, rales, or rhonchi apprecaited. No significant sputum production or increased respiratory effort.    NECK: Supple, no lymphadenopathy, trachea midline.    CVS: Regular rate and rhythm, no murmurs appreciated, +s1/s2.    ABD: Soft, nondistended, nontender, normoactive bowel sounds.    EXT: No edema, nontender.    SKIN: Warm and well perfused, no rashes noted.    NEURO: Alert, oriented, interactive, nonfocal.    RADIOLOGY: < from: CT Abdomen and Pelvis No Cont (06.23.22 @ 14:09) >  PROCEDURE DATE:  06/23/2022          INTERPRETATION:  CLINICAL INFORMATION: Abdominal pain    COMPARISON: None.    CONTRAST/COMPLICATIONS:  IV Contrast: NONE  Oral Contrast: NONE  Complications: None reported at time of study completion    PROCEDURE:  CT of the Abdomen and Pelvis was performed.  Sagittal and coronal reformats were performed.    FINDINGS: The examination is limited by lack of IV/oral contrast.  LOWER CHEST: Small bilateral pleural effusions. Mild bilateral   atelectasis. Prominence of the central pulmonary arteries, suggestive of   pulmonary arterial hypertension.    LIVER: Within normal limits.  BILE DUCTS: Normal caliber.  GALLBLADDER: Within normal limits.  SPLEEN: Within normal limits.  PANCREAS: Within normal limits.  ADRENALS: Nonspecific mild adrenal thickening bilaterally.  KIDNEYS/URETERS: No evidence for a calculus in the kidneys or ureters. No   hydronephrosis. Bilateral extrarenal pelvises. Apparent 1.7 cm   heterogeneous left renal lesion with Hounsfield unit of 33 (image 53   series 2); if clinically indicated, abdominal MR without and with IV   contrast may be pursued to rule out a solid malignant neoplasm.    BLADDER: Mckeon catheter in the urinary bladder which is collapsed.  REPRODUCTIVE ORGANS: The uterus is not visualized, probably surgically   absent.    BOWEL: No bowel obstruction or grossly thickened bowel wall. Colonic   diverticulosis without evidence of diverticulitis. Appendix within normal   limits. Moderate amount of stool in the rectum.  PERITONEUM: No free air or ascites.  VESSELS: Calcified atherosclerotic disease.  RETROPERITONEUM/LYMPH NODES: No lymphadenopathy.  ABDOMINAL WALL: Small fat-containing umbilical hernia.  BONES: Mild degenerative spondylosis.    IMPRESSION:  No bowel obstruction or grossly thickened bowel wall. Colonic   diverticulosis without evidence of diverticulitis. Appendix within normal   limits. Moderate amount of stool in the rectum.    Small bilateral pleural effusions    Apparent 1.7 cm heterogeneous left renal lesion with Hounsfield unit of   33; if clinically indicated, abdominal MR without and with IV contrast   may be pursued to rule out a solid malignant neoplasm.    --- End of Report ---      < end of copied text >     Patient is a 72y old  Female who presents with a chief complaint of Sepsis (25 Jun 2022 11:04)      BRIEF HOSPITAL COURSE: 71 y/o F w/ pmh of CVA w/ L. hemiparesis, htn, hld, hypothyroid CKD, Dm2, depression, LLE DVT on Eliquis and Ceftin for possible UTI in SNF, presented to [facility] on [facility]/23/20[facility] for for abnormal labs, in the ED pt was found to be septic shock requiring ryan, MAXIMILIAN on CKD, UTI w/ hematuria, metabolic acidosis requiring bicarb gtt  and suprathreaputic INR.    Events last 24 hours: Bicarb gtt and Ryan gtt dc'd. 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  EYES: No eye pain, visual disturbances, or discharge.  ENMT:  No difficulty hearing, tinnitus, or vertigo. No sinus or throat pain.  NECK: No pain or stiffness.  RESPIRATORY: No shortness of breath, cough, or wheezing.  CARDIOVASCULAR: No chest pain, palpitations, dizziness, or leg swelling.  GASTROINTESTINAL: No abdominal or epigastric pain. No nausea, vomiting, diarrhea, or constipation. No hematemesis, melena, or hematochezia.  GENITOURINARY: No dysuria, increased frequency, hematuria, or incontinence.  NEUROLOGICAL: No headaches, memory loss, loss of strength, numbness, or tremors.  SKIN: No itching, burning, rashes, or lesions.  MUSCULOSKELETAL: No joint pain or swelling. No muscle, back, or extremity pain.  PSYCHIATRIC: No depression, anxiety, mood swings, or difficulty sleeping.    Medications:  cefepime   IVPB 500 milliGRAM(s) IV Intermittent daily    midodrine 5 milliGRAM(s) Oral every 8 hours  phenylephrine    Infusion 0.1 MICROgram(s)/kG/Min IV Continuous <Continuous>      acetaminophen     Tablet .. 650 milliGRAM(s) Oral every 6 hours PRN  mirtazapine 15 milliGRAM(s) Oral at bedtime  sertraline 50 milliGRAM(s) Oral daily  traMADol 25 milliGRAM(s) Oral every 12 hours PRN        pantoprazole    Tablet 40 milliGRAM(s) Oral daily      atorvastatin 80 milliGRAM(s) Oral at bedtime  dextrose 50% Injectable 25 Gram(s) IV Push once  dextrose 50% Injectable 12.5 Gram(s) IV Push once  dextrose 50% Injectable 25 Gram(s) IV Push once  insulin glargine Injectable (LANTUS) 6 Unit(s) SubCutaneous at bedtime  insulin lispro (ADMELOG) corrective regimen sliding scale   SubCutaneous at bedtime  insulin lispro (ADMELOG) corrective regimen sliding scale   SubCutaneous three times a day before meals    dextrose 5% + lactated ringers. 1000 milliLiter(s) IV Continuous <Continuous>  dextrose 5%. 1000 milliLiter(s) IV Continuous <Continuous>  dextrose 5%. 1000 milliLiter(s) IV Continuous <Continuous>      chlorhexidine 2% Cloths 1 Application(s) Topical <User Schedule>            ICU Vital Signs Last 24 Hrs  T(C): 36.5 (25 Jun 2022 16:00), Max: 36.9 (24 Jun 2022 22:00)  T(F): 97.7 (25 Jun 2022 16:00), Max: 98.4 (24 Jun 2022 22:00)  HR: 85 (25 Jun 2022 19:00) (73 - 126)  BP: 144/61 (25 Jun 2022 19:00) (90/49 - 159/86)  BP(mean): 86 (25 Jun 2022 19:00) (61 - 106)  ABP: --  ABP(mean): --  RR: 13 (25 Jun 2022 19:00) (10 - 23)  SpO2: 98% (25 Jun 2022 19:00) (91% - 99%)          I&O's Detail    24 Jun 2022 07:01  -  25 Jun 2022 07:00  --------------------------------------------------------  IN:    Albumin 25%  -  50 mL: 100 mL    IV PiggyBack: 50 mL    IV PiggyBack: 400 mL    Lactated Ringers: 1100 mL    Oral Fluid: 120 mL    Phenylephrine: 25 mL    Sodium Bicarbonate: 1000 mL  Total IN: 2795 mL    OUT:    Indwelling Catheter - Urethral (mL): 1775 mL  Total OUT: 1775 mL    Total NET: 1020 mL      25 Jun 2022 07:01  -  25 Jun 2022 21:14  --------------------------------------------------------  IN:    dextrose 5% + lactated ringers: 900 mL    Lactated Ringers: 200 mL  Total IN: 1100 mL    OUT:    Indwelling Catheter - Urethral (mL): 1100 mL  Total OUT: 1100 mL    Total NET: 0 mL          LABS:                        7.2    20.84 )-----------( 418      ( 25 Jun 2022 05:30 )             21.8     06-25    144  |  106  |  85<H>  ----------------------------<  153<H>  3.9   |  29  |  4.57<H>    Ca    7.8<L>      25 Jun 2022 17:15  Phos  3.3     06-25  Mg     2.7     06-25    TPro  6.1  /  Alb  1.7<L>  /  TBili  0.4  /  DBili  x   /  AST  73<H>  /  ALT  31  /  AlkPhos  208<H>  06-25          CAPILLARY BLOOD GLUCOSE      POCT Blood Glucose.: 173 mg/dL (25 Jun 2022 17:39)    PT/INR - ( 25 Jun 2022 05:30 )   PT: 40.1 sec;   INR: 3.41 ratio         PTT - ( 25 Jun 2022 05:30 )  PTT:45.4 sec    CULTURES:  Culture Results:   >=3 organisms. Probable collection contamination. (06-23 @ 15:33)  Rapid RVP Result: NotDetec (06-23 @ 12:21)  Culture Results:   No growth to date. (06-23 @ 12:21)  Culture Results:   No growth to date. (06-23 @ 12:21)      Physical Examination:    VITALS AT TIME OF EXAM:  HR:  BP:  RR:  SPO2:    General: Well appearing, lying in bed in NAD.      HEENT: Pupils equal, reactive to light. Symmetric. No scleral icterus or injection.    PULM: Clear to auscultation B/L. No wheezes, rales, or rhonchi apprecaited. No significant sputum production or increased respiratory effort.    NECK: Supple, no lymphadenopathy, trachea midline.    CVS: Regular rate and rhythm, no murmurs appreciated, +s1/s2.    ABD: Soft, nondistended, nontender, normoactive bowel sounds.    EXT: No edema, nontender.    SKIN: Warm and well perfused, no rashes noted.    NEURO: Alert, oriented, interactive, nonfocal.    RADIOLOGY: < from: CT Abdomen and Pelvis No Cont (06.23.22 @ 14:09) >  PROCEDURE DATE:  06/23/2022          INTERPRETATION:  CLINICAL INFORMATION: Abdominal pain    COMPARISON: None.    CONTRAST/COMPLICATIONS:  IV Contrast: NONE  Oral Contrast: NONE  Complications: None reported at time of study completion    PROCEDURE:  CT of the Abdomen and Pelvis was performed.  Sagittal and coronal reformats were performed.    FINDINGS: The examination is limited by lack of IV/oral contrast.  LOWER CHEST: Small bilateral pleural effusions. Mild bilateral   atelectasis. Prominence of the central pulmonary arteries, suggestive of   pulmonary arterial hypertension.    LIVER: Within normal limits.  BILE DUCTS: Normal caliber.  GALLBLADDER: Within normal limits.  SPLEEN: Within normal limits.  PANCREAS: Within normal limits.  ADRENALS: Nonspecific mild adrenal thickening bilaterally.  KIDNEYS/URETERS: No evidence for a calculus in the kidneys or ureters. No   hydronephrosis. Bilateral extrarenal pelvises. Apparent 1.7 cm   heterogeneous left renal lesion with Hounsfield unit of 33 (image 53   series 2); if clinically indicated, abdominal MR without and with IV   contrast may be pursued to rule out a solid malignant neoplasm.    BLADDER: Mckeon catheter in the urinary bladder which is collapsed.  REPRODUCTIVE ORGANS: The uterus is not visualized, probably surgically   absent.    BOWEL: No bowel obstruction or grossly thickened bowel wall. Colonic   diverticulosis without evidence of diverticulitis. Appendix within normal   limits. Moderate amount of stool in the rectum.  PERITONEUM: No free air or ascites.  VESSELS: Calcified atherosclerotic disease.  RETROPERITONEUM/LYMPH NODES: No lymphadenopathy.  ABDOMINAL WALL: Small fat-containing umbilical hernia.  BONES: Mild degenerative spondylosis.    IMPRESSION:  No bowel obstruction or grossly thickened bowel wall. Colonic   diverticulosis without evidence of diverticulitis. Appendix within normal   limits. Moderate amount of stool in the rectum.    Small bilateral pleural effusions    Apparent 1.7 cm heterogeneous left renal lesion with Hounsfield unit of   33; if clinically indicated, abdominal MR without and with IV contrast   may be pursued to rule out a solid malignant neoplasm.    --- End of Report ---      < end of copied text >

## 2022-06-25 NOTE — PROGRESS NOTE ADULT - NUTRITIONAL ASSESSMENT
This patient has been assessed with a concern for Malnutrition and has been determined to have a diagnosis/diagnoses of Severe protein-calorie malnutrition.    This patient is being managed with:   Diet Pureed-  Consistent Carbohydrate {Evening Snack}  DASH/TLC {Sodium & Cholesterol Restricted}  Moderately Thick Liquids (MODTHICKLIQS)  No Concentrated Potassium  Supplement Feeding Modality:  Oral  Nepro Cans or Servings Per Day:  1       Frequency:  Three Times a day  Entered: Jun 25 2022  9:56AM

## 2022-06-25 NOTE — PROGRESS NOTE ADULT - SUBJECTIVE AND OBJECTIVE BOX
[facility] NEPHROLOGY SERVICES, [facility]  NEPHROLOGY AND HYPERTENSION  [address]  [address]  [place] [address]  [phone]    MD KELSEY NAILS MD ANDREY GONCHARUK, MD MADHU KORRAPATI, MD YELENA ROSENBERG, MD BINNY KOSHY, MD CHRISTOPHER CAPUTO, MD EDWARD BOVER, MD          Patient events noted  no distress      MEDICATIONS  (STANDING):  atorvastatin 80 milliGRAM(s) Oral at bedtime  cefepime   IVPB 500 milliGRAM(s) IV Intermittent daily  chlorhexidine 2% Cloths 1 Application(s) Topical <User Schedule>  dextrose 5% + lactated ringers. 1000 milliLiter(s) (100 mL/Hr) IV Continuous <Continuous>  dextrose 5%. 1000 milliLiter(s) (100 mL/Hr) IV Continuous <Continuous>  dextrose 5%. 1000 milliLiter(s) (50 mL/Hr) IV Continuous <Continuous>  dextrose 50% Injectable 25 Gram(s) IV Push once  dextrose 50% Injectable 12.5 Gram(s) IV Push once  dextrose 50% Injectable 25 Gram(s) IV Push once  insulin glargine Injectable (LANTUS) 6 Unit(s) SubCutaneous at bedtime  insulin lispro (ADMELOG) corrective regimen sliding scale   SubCutaneous at bedtime  insulin lispro (ADMELOG) corrective regimen sliding scale   SubCutaneous three times a day before meals  midodrine 5 milliGRAM(s) Oral every 8 hours  mirtazapine 15 milliGRAM(s) Oral at bedtime  pantoprazole    Tablet 40 milliGRAM(s) Oral daily  phenylephrine    Infusion 0.1 MICROgram(s)/kG/Min (2.66 mL/Hr) IV Continuous <Continuous>  sertraline 50 milliGRAM(s) Oral daily    MEDICATIONS  (PRN):  acetaminophen     Tablet .. 650 milliGRAM(s) Oral every 6 hours PRN Temp greater or equal to 38C (100.4F), Mild Pain (1 - 3)  traMADol 25 milliGRAM(s) Oral every 12 hours PRN Moderate Pain (4 - 6)      06-24-22 @ 07:01  -  06-25-22 @ 07:00  --------------------------------------------------------  IN: 2795 mL / OUT: 1775 mL / NET: 1020 mL    06-25-22 @ 07:01  -  06-25-22 @ 22:30  --------------------------------------------------------  IN: 1500 mL / OUT: 1100 mL / NET: 400 mL      PHYSICAL EXAM:      T(C): 36.7 (06-25-22 @ 19:00), Max: 36.7 (06-25-22 @ 19:00)  HR: 87 (06-25-22 @ 21:00) (73 - 126)  BP: 142/65 (06-25-22 @ 21:00) (90/49 - 159/86)  RR: 11 (06-25-22 @ 21:00) (10 - 22)  SpO2: 98% (06-25-22 @ 21:00) (91% - 99%)  Wt(kg): --  Lungs clear  Heart S1S2  Abd soft NT ND  Extremities:   1 edema                                    7.2    20.84 )-----------( 418      ( 25 Jun 2022 05:30 )             21.8     06-25    144  |  106  |  85<H>  ----------------------------<  153<H>  3.9   |  29  |  4.57<H>    Ca    7.8<L>      25 Jun 2022 17:15  Phos  3.3     06-25  Mg     2.7     06-25    TPro  6.1  /  Alb  1.7<L>  /  TBili  0.4  /  DBili  x   /  AST  73<H>  /  ALT  31  /  AlkPhos  208<H>  06-25      LIVER FUNCTIONS - ( 25 Jun 2022 17:15 )  Alb: 1.7 g/dL / Pro: 6.1 g/dL / ALK PHOS: 208 U/L / ALT: 31 U/L / AST: 73 U/L / GGT: x           Creatinine Trend: 4.57<--, 4.85<--, 5.45<--, 5.57<--, 5.78<--, 5.66<--      ASSESSMENT:  1.  MAXIMILIAN due to septic ATN, oliguric  2.  Sepsis with presumed  source, possibly bacteremic  3.  Metabolic acidosis, non lactic, better    PLAN:  Judicious IVF  Antibiotics per ID  Monitor BMP     Holger Aldana MD

## 2022-06-25 NOTE — PROGRESS NOTE ADULT - ASSESSMENT
Patient is a 72y female with a past history of a remote CVA with left sided weakness, HTN,HLD,DM,hypothyroidism, CKD and recent 5 day [facility] admission for transient left sided neurological signs, snd out on ceftin for E Coli  UTI, who was admitted 6/23 with hypotension and an MAXIMILIAN.  Her creat had increased from 2.2 to 6.7, her wbc had increased to over 30,000.  She reportedly was not feeling well for a few days at , had abdominal pain, had received CTX and zosyn, was sent to ER 6/23 for evaluation.  She had rodriguez placed, over 500 cc urine, received fluids for BP in the 80's and antibiotics continued.She appears somewhat slow in her responses, has nonspecific abdominal discomfort.She is presently on cefepime.She has also received vanco and zosyn.  Urosepsis, MAXIMILIAN, and urinary retention may be the unifying diagnosis.  It is hard to comprehend how she became so ill in such a short period of time.  Her admission cultures here may be impacted by SNF antibiotics.  Admission blood cultures are negative at 48 hours and her urine is polymicrobic, ? contaminated  She is responding to supportive measures and antibiotics, now off pressers and creat improving as is wbc count.  Suggest:  1.Hydrate  2, Cefepime renally dosed, will try to avoid nephrotoxins   3. Follow cultures  4.Monitor wbc count and creatinine  5. Additional w.u as indicated, does not appear to have acute GI pathology, ? retention and urosepsis.

## 2022-06-26 LAB
ALBUMIN SERPL ELPH-MCNC: 1.4 G/DL — LOW (ref 3.3–5)
ALP SERPL-CCNC: 200 U/L — HIGH (ref 40–120)
ALT FLD-CCNC: 27 U/L — SIGNIFICANT CHANGE UP (ref 10–45)
ANION GAP SERPL CALC-SCNC: 9 MMOL/L — SIGNIFICANT CHANGE UP (ref 5–17)
APTT BLD: 51.2 SEC — HIGH (ref 27.5–35.5)
AST SERPL-CCNC: 54 U/L — HIGH (ref 10–40)
BASOPHILS # BLD AUTO: 0.07 K/UL — SIGNIFICANT CHANGE UP (ref 0–0.2)
BASOPHILS NFR BLD AUTO: 0.3 % — SIGNIFICANT CHANGE UP (ref 0–2)
BILIRUB SERPL-MCNC: 0.5 MG/DL — SIGNIFICANT CHANGE UP (ref 0.2–1.2)
BUN SERPL-MCNC: 75 MG/DL — HIGH (ref 7–23)
CALCIUM SERPL-MCNC: 7.7 MG/DL — LOW (ref 8.4–10.5)
CHLORIDE SERPL-SCNC: 109 MMOL/L — HIGH (ref 96–108)
CO2 SERPL-SCNC: 28 MMOL/L — SIGNIFICANT CHANGE UP (ref 22–31)
CREAT SERPL-MCNC: 4.48 MG/DL — HIGH (ref 0.5–1.3)
EGFR: 10 ML/MIN/1.73M2 — LOW
EOSINOPHIL # BLD AUTO: 0.31 K/UL — SIGNIFICANT CHANGE UP (ref 0–0.5)
EOSINOPHIL NFR BLD AUTO: 1.5 % — SIGNIFICANT CHANGE UP (ref 0–6)
GLUCOSE BLDC GLUCOMTR-MCNC: 172 MG/DL — HIGH (ref 70–99)
GLUCOSE BLDC GLUCOMTR-MCNC: 173 MG/DL — HIGH (ref 70–99)
GLUCOSE BLDC GLUCOMTR-MCNC: 179 MG/DL — HIGH (ref 70–99)
GLUCOSE BLDC GLUCOMTR-MCNC: 202 MG/DL — HIGH (ref 70–99)
GLUCOSE SERPL-MCNC: 172 MG/DL — HIGH (ref 70–99)
HCT VFR BLD CALC: 23 % — LOW (ref 34.5–45)
HGB BLD-MCNC: 7.9 G/DL — LOW (ref 11.5–15.5)
IMM GRANULOCYTES NFR BLD AUTO: 1.2 % — SIGNIFICANT CHANGE UP (ref 0–1.5)
INR BLD: 1.78 RATIO — HIGH (ref 0.88–1.16)
LYMPHOCYTES # BLD AUTO: 15.7 % — SIGNIFICANT CHANGE UP (ref 13–44)
LYMPHOCYTES # BLD AUTO: 3.16 K/UL — SIGNIFICANT CHANGE UP (ref 1–3.3)
MAGNESIUM SERPL-MCNC: 2.5 MG/DL — SIGNIFICANT CHANGE UP (ref 1.6–2.6)
MCHC RBC-ENTMCNC: 27.1 PG — SIGNIFICANT CHANGE UP (ref 27–34)
MCHC RBC-ENTMCNC: 34.3 GM/DL — SIGNIFICANT CHANGE UP (ref 32–36)
MCV RBC AUTO: 78.8 FL — LOW (ref 80–100)
MONOCYTES # BLD AUTO: 0.94 K/UL — HIGH (ref 0–0.9)
MONOCYTES NFR BLD AUTO: 4.7 % — SIGNIFICANT CHANGE UP (ref 2–14)
NEUTROPHILS # BLD AUTO: 15.46 K/UL — HIGH (ref 1.8–7.4)
NEUTROPHILS NFR BLD AUTO: 76.6 % — SIGNIFICANT CHANGE UP (ref 43–77)
NRBC # BLD: 0 /100 WBCS — SIGNIFICANT CHANGE UP (ref 0–0)
PHOSPHATE SERPL-MCNC: 3 MG/DL — SIGNIFICANT CHANGE UP (ref 2.5–4.5)
PLATELET # BLD AUTO: 249 K/UL — SIGNIFICANT CHANGE UP (ref 150–400)
POTASSIUM SERPL-MCNC: 3.3 MMOL/L — LOW (ref 3.5–5.3)
POTASSIUM SERPL-SCNC: 3.3 MMOL/L — LOW (ref 3.5–5.3)
PROT SERPL-MCNC: 5.4 G/DL — LOW (ref 6–8.3)
PROTHROM AB SERPL-ACNC: 20.8 SEC — HIGH (ref 10.5–13.4)
RBC # BLD: 2.92 M/UL — LOW (ref 3.8–5.2)
RBC # FLD: 17.2 % — HIGH (ref 10.3–14.5)
SODIUM SERPL-SCNC: 146 MMOL/L — HIGH (ref 135–145)
WBC # BLD: 20.19 K/UL — HIGH (ref 3.8–10.5)
WBC # FLD AUTO: 20.19 K/UL — HIGH (ref 3.8–10.5)

## 2022-06-26 RX ORDER — APIXABAN 2.5 MG/1
2.5 TABLET, FILM COATED ORAL EVERY 12 HOURS
Refills: 0 | Status: DISCONTINUED | OUTPATIENT
Start: 2022-06-26 | End: 2022-06-27

## 2022-06-26 RX ORDER — HYDRALAZINE HCL 50 MG
10 TABLET ORAL ONCE
Refills: 0 | Status: COMPLETED | OUTPATIENT
Start: 2022-06-26 | End: 2022-06-26

## 2022-06-26 RX ORDER — POTASSIUM CHLORIDE 20 MEQ
10 PACKET (EA) ORAL
Refills: 0 | Status: COMPLETED | OUTPATIENT
Start: 2022-06-26 | End: 2022-06-26

## 2022-06-26 RX ADMIN — APIXABAN 2.5 MILLIGRAM(S): 2.5 TABLET, FILM COATED ORAL at 17:24

## 2022-06-26 RX ADMIN — MIRTAZAPINE 15 MILLIGRAM(S): 45 TABLET, ORALLY DISINTEGRATING ORAL at 21:33

## 2022-06-26 RX ADMIN — INSULIN GLARGINE 6 UNIT(S): 100 INJECTION, SOLUTION SUBCUTANEOUS at 21:34

## 2022-06-26 RX ADMIN — Medication 100 MILLIEQUIVALENT(S): at 09:49

## 2022-06-26 RX ADMIN — ATORVASTATIN CALCIUM 80 MILLIGRAM(S): 80 TABLET, FILM COATED ORAL at 21:33

## 2022-06-26 RX ADMIN — SERTRALINE 50 MILLIGRAM(S): 25 TABLET, FILM COATED ORAL at 12:53

## 2022-06-26 RX ADMIN — Medication 1: at 17:25

## 2022-06-26 RX ADMIN — Medication 100 MILLIEQUIVALENT(S): at 12:52

## 2022-06-26 RX ADMIN — Medication 100 MILLIEQUIVALENT(S): at 11:06

## 2022-06-26 RX ADMIN — Medication 10 MILLIGRAM(S): at 21:34

## 2022-06-26 RX ADMIN — CHLORHEXIDINE GLUCONATE 1 APPLICATION(S): 213 SOLUTION TOPICAL at 05:48

## 2022-06-26 RX ADMIN — Medication 1: at 10:32

## 2022-06-26 RX ADMIN — CEFEPIME 100 MILLIGRAM(S): 1 INJECTION, POWDER, FOR SOLUTION INTRAMUSCULAR; INTRAVENOUS at 12:53

## 2022-06-26 RX ADMIN — Medication 2: at 12:29

## 2022-06-26 NOTE — PROGRESS NOTE ADULT - SUBJECTIVE AND OBJECTIVE BOX
[facility] NEPHROLOGY SERVICES, [facility]  NEPHROLOGY AND HYPERTENSION  [address]  [address]  [place] [address]  [phone]    MD KELSEY NAILS MD ANDREY GONCHARUK, MD MADHU KORRAPATI, MD YELENA ROSENBERG, MD BINNY KOSHY, MD CHRISTOPHER CAPUTO, MD CROW AGUILAR MD          Patient events noted  No distress      MEDICATIONS  (STANDING):  apixaban 2.5 milliGRAM(s) Oral every 12 hours  atorvastatin 80 milliGRAM(s) Oral at bedtime  cefepime   IVPB 500 milliGRAM(s) IV Intermittent daily  chlorhexidine 2% Cloths 1 Application(s) Topical <User Schedule>  dextrose 5%. 1000 milliLiter(s) (100 mL/Hr) IV Continuous <Continuous>  dextrose 5%. 1000 milliLiter(s) (50 mL/Hr) IV Continuous <Continuous>  dextrose 50% Injectable 25 Gram(s) IV Push once  dextrose 50% Injectable 12.5 Gram(s) IV Push once  dextrose 50% Injectable 25 Gram(s) IV Push once  insulin glargine Injectable (LANTUS) 6 Unit(s) SubCutaneous at bedtime  insulin lispro (ADMELOG) corrective regimen sliding scale   SubCutaneous at bedtime  insulin lispro (ADMELOG) corrective regimen sliding scale   SubCutaneous three times a day before meals  mirtazapine 15 milliGRAM(s) Oral at bedtime  pantoprazole    Tablet 40 milliGRAM(s) Oral daily  sertraline 50 milliGRAM(s) Oral daily    MEDICATIONS  (PRN):  acetaminophen     Tablet .. 650 milliGRAM(s) Oral every 6 hours PRN Temp greater or equal to 38C (100.4F), Mild Pain (1 - 3)  traMADol 25 milliGRAM(s) Oral every 12 hours PRN Moderate Pain (4 - 6)      06-25-22 @ 07:01  -  06-26-22 @ 07:00  --------------------------------------------------------  IN: 2300 mL / OUT: 2525 mL / NET: -225 mL    06-26-22 @ 07:01  -  06-26-22 @ 23:24  --------------------------------------------------------  IN: 0 mL / OUT: 600 mL / NET: -600 mL      PHYSICAL EXAM:      T(C): 37 (06-26-22 @ 21:00), Max: 37 (06-26-22 @ 21:00)  HR: 91 (06-26-22 @ 21:00) (53 - 106)  BP: 192/89 (06-26-22 @ 21:00) (124/66 - 192/89)  RR: 15 (06-26-22 @ 21:00) (11 - 22)  SpO2: 99% (06-26-22 @ 21:00) (97% - 100%)  Wt(kg): --  Lungs clear  Heart S1S2  Abd soft NT ND  Extremities:   tr edema                                    7.9    20.19 )-----------( 249      ( 26 Jun 2022 05:30 )             23.0     06-26    146<H>  |  109<H>  |  75<H>  ----------------------------<  172<H>  3.3<L>   |  28  |  4.48<H>    Ca    7.7<L>      26 Jun 2022 05:30  Phos  3.0     06-26  Mg     2.5     06-26    TPro  5.4<L>  /  Alb  1.4<L>  /  TBili  0.5  /  DBili  x   /  AST  54<H>  /  ALT  27  /  AlkPhos  200<H>  06-26      LIVER FUNCTIONS - ( 26 Jun 2022 05:30 )  Alb: 1.4 g/dL / Pro: 5.4 g/dL / ALK PHOS: 200 U/L / ALT: 27 U/L / AST: 54 U/L / GGT: x           Creatinine Trend: 4.48<--, 4.57<--, 4.85<--, 5.45<--, 5.57<--, 5.78<--        ASSESSMENT:  1.  MAXIMILIAN due to septic ATN, oliguric  2.  Sepsis with presumed  source, possibly bacteremic  3.  Metabolic acidosis, non lactic, better    PLAN:  Supportive care  Replete K  Antibiotics per ID  Monitor BMP     Holger Aldana MD

## 2022-06-26 NOTE — PROGRESS NOTE ADULT - ASSESSMENT
Physical Examination:  GENERAL:               Alert, Oriented, No acute distress.    HEENT:                    No JVD, Dry MM  PULM:                     Bilateral air entry, Clear to auscultation bilaterally, no significant sputum production, No Rales, No Rhonchi, No Wheezing  CVS:                         S1, S2,  +Murmur  ABD:                        Soft, nondistended, nontender, normoactive bowel sounds,   EXT:                         mild edema, nontender, No Cyanosis or Clubbing   Vascular:                Warm Extremities, Normal Capillary refill, Normal Distal Pulses  NEURO:                  Alert, oriented, interactive, LHP , follows commands  PSYC:                      Calm, +Insight.    Assessment  Septic shock with UTI    possible urinary retention  MAXIMILIAN On CKD with baseline around Cr 2.5 Imporoving  DVT on eliquis  Elevated INR s/p VIt K , ? from sepsis  Abnormal CT: No evidence for a calculus in the kidneys or ureters. No hydronephrosis. Bilateral extrarenal pelvises. Apparent 1.7 cm heterogeneous left renal lesion with Hounsfield unit of 33 (image 53 series 2); if clinically indicated, abdominal MR without and with IV contrast may be pursued to rule out a solid malignant neoplasm.  Underlying Dementia, Depression, DM2 on insulin, h/o CVA with Left HP, CKD          Plan  Diet as per S&S  Continue IVF  Off pressors  Antibiotics as per ID     Urology - " rodriguez draining, ? may best be managed with chronic rodriguez for recurrent retention s/p CVA"  Appreciate Renal and heme onc input  Restart Eliquis for dvt         f/u cultures  needs MRI when stable to evaluate  1.7 cm heterogeneous left renal lesion when stable  Supplement and monitor lytes    PMD:				                   Notified(Date):  Family Updated: 	Jalyn [phone]	                                 Date: 6/26/22 by ICU PA      Sedation & Analgesia:	  Diet/Nutrition:		   Diet, Consistent Carbohydrate/No Snacks:   Soft and Bite Sized (SOFTBTSZ)  Supplement Feeding Modality:  Oral  Nepro Cans or Servings Per Day:  1       Frequency:  Three Times a day (06-24-22 @ 08:59) [Pending Verification By Attending]        GI PPx:			pantoprazole    Tablet 40 milliGRAM(s) Oral daily    DVT Ppx:		On hold    	RISK                                                          Points  	[ x] Previous VTE                                           	3  	[ ] Thrombophilia                                        	2  	[ x] Lower limb paralysis                              	2   	[ ] Current Cancer                                       	2   	[ ] Immobilization > 24 hrs                        	1  	[ ] ICU/CCU stay > 24 hours                       	1  	[x ] Age > 60                                                   	1  		Total:[6 ]      Activity:		    Head of Bed:               35-45 Deg  Glycemic Control:          albumin human 25% IVPB 100 milliLiter(s) IV Intermittent once  atorvastatin 80 milliGRAM(s) Oral at bedtime  dextrose 5%. 1000 milliLiter(s) IV Continuous <Continuous>  dextrose 5%. 1000 milliLiter(s) IV Continuous <Continuous>  dextrose 50% Injectable 25 Gram(s) IV Push once  dextrose 50% Injectable 12.5 Gram(s) IV Push once  dextrose 50% Injectable 25 Gram(s) IV Push once  insulin glargine Injectable (LANTUS) 6 Unit(s) SubCutaneous at bedtime  insulin lispro (ADMELOG) corrective regimen sliding scale   SubCutaneous at bedtime  insulin lispro (ADMELOG) corrective regimen sliding scale   SubCutaneous three times a day before meals  sodium bicarbonate  Infusion 0.265 mEq/kG/Hr IV Continuous <Continuous>      Lines: PIV  CENTRAL LINE: 	[ ] YES [ ] NO	                    LOCATION:   	                       DATE INSERTED:   	                    REMOVE:  [ ] YES [ ] NO    A-LINE:  	                [ ] YES [ ] NO                      LOCATION:   	                       DATE INSERTED: 		            REMOVE:  [ ] YES [ ] NO    RODRIGUEZ: 		        [ x] YES [ ] NO  		                                       DATE INSERTED:		            REMOVE:  [ ] YES [ x] NO   // suspect retention    Restraints were deemed necessary to prevent removal of life-sustaining devices [  ] YES   [  x  ]  NO    Disposition: Transfer to medical floor    Goals of Care:  DNR/I , molst completed by palliative

## 2022-06-26 NOTE — PROGRESS NOTE ADULT - SUBJECTIVE AND OBJECTIVE BOX
CC: f/u for presumed sepsis and MAXIMILIAN    Patient reports: she is comfortable, not moving left side, appears comfortable    REVIEW OF SYSTEMS:  All other review of systems negative (Comprehensive ROS)    Antimicrobials Day #  :day 4  cefepime   IVPB 500 milliGRAM(s) IV Intermittent daily    Other Medications Reviewed    T(F): 98.4 (06-26-22 @ 03:00), Max: 98.4 (06-25-22 @ 23:00)  HR: 89 (06-26-22 @ 06:00)  BP: 157/78 (06-26-22 @ 06:00)  RR: 11 (06-26-22 @ 06:00)  SpO2: 99% (06-26-22 @ 06:00)  Wt(kg): --    PHYSICAL EXAM:  General: alert, no acute distress  Eyes:  anicteric, no conjunctival injection, no discharge  Oropharynx: no lesions or injection 	  Neck: supple, without adenopathy  Lungs: clear to auscultation  Heart: regular rate and rhythm; no murmur, rubs or gallops  Abdomen: soft, nondistended, nontender, without mass or organomegaly  Skin: no lesions  Extremities: no clubbing, cyanosis, or edema  Neurologic: alert, oriented, left sided hemiparesis   rodriguez    LAB RESULTS:                        7.9    20.19 )-----------( 249      ( 26 Jun 2022 05:30 )             23.0     06-26    146<H>  |  109<H>  |  75<H>  ----------------------------<  172<H>  3.3<L>   |  28  |  4.48<H>    Ca    7.7<L>      26 Jun 2022 05:30  Phos  3.0     06-26  Mg     2.5     06-26    TPro  5.4<L>  /  Alb  1.4<L>  /  TBili  0.5  /  DBili  x   /  AST  54<H>  /  ALT  27  /  AlkPhos  200<H>  06-26    LIVER FUNCTIONS - ( 26 Jun 2022 05:30 )  Alb: 1.4 g/dL / Pro: 5.4 g/dL / ALK PHOS: 200 U/L / ALT: 27 U/L / AST: 54 U/L / GGT: x             MICROBIOLOGY:  RECENT CULTURES:  06-23 @ 15:33 Clean Catch Clean Catch (Midstream)     >=3 organisms. Probable collection contamination.      06-23 @ 12:21 .Blood Blood-Peripheral     No growth to date.          RADIOLOGY REVIEWED:    < from: CT Abdomen and Pelvis No Cont (06.23.22 @ 14:09) >  IMPRESSION:  No bowel obstruction or grossly thickened bowel wall. Colonic   diverticulosis without evidence of diverticulitis. Appendix within normal   limits. Moderate amount of stool in the rectum.    Small bilateral pleural effusions    Apparent 1.7 cm heterogeneous left renal lesion with Hounsfield unit of   33; if clinically indicated, abdominal MR without and with IV contrast   may be pursued to rule out a solid malignant neoplasm.    < end of copied text >

## 2022-06-26 NOTE — PROGRESS NOTE ADULT - ASSESSMENT
Patient is a 72y female with a past history of a remote CVA with left sided weakness, HTN,HLD,DM,hypothyroidism, CKD and recent 5 day [facility] admission for transient left sided neurological signs, snd out on ceftin for E Coli  UTI, who was admitted 6/23 with hypotension and an MAXIMILIAN.  Her creat had increased from 2.2 to 6.7, her wbc had increased to over 30,000.  She reportedly was not feeling well for a few days at , had abdominal pain, had received CTX and zosyn, was sent to ER 6/23 for evaluation.  She had rodriguez placed, over 500 cc urine, received fluids for BP in the 80's and antibiotics continued.She appears somewhat slow in her responses, has nonspecific abdominal discomfort.She is presently on cefepime.She has also received vanco and zosyn.  Urosepsis, MAXIMILIAN, and urinary retention may be the unifying diagnosis.  It is hard to comprehend how she became so ill in such a short period of time.  Her admission cultures here may be impacted by SNF antibiotics.  Admission blood cultures are negative at 48 hours and her urine is polymicrobic, ? contaminated  She is responding to supportive measures and antibiotics, now off pressers and creat improving as is wbc count.  No positive blood cultures and urine specimen was contaminated.  The tentative diagnosis is urosepsis with obstruction.  Suggest:  1.Hydrate  2, Cefepime renally dosed, will try to avoid nephrotoxins   3. Follow cultures  4.Monitor wbc count and creatinine  5. If leukocytosis does not moderate will repeat blood cultures in AM, sampling urine may be problematic with  rodriguez

## 2022-06-26 NOTE — PROGRESS NOTE ADULT - SUBJECTIVE AND OBJECTIVE BOX
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 1000 milliLiter(s) (100 mL/Hr) IV Continuous <Continuous>  dextrose 5%. 1000 milliLiter(s) (100 mL/Hr) IV Continuous <Continuous>  dextrose 5%. 1000 milliLiter(s) (50 mL/Hr) IV Continuous <Continuous>  dextrose 50% Injectable 25 Gram(s) IV Push once  dextrose 50% Injectable 12.5 Gram(s) IV Push once  dextrose 50% Injectable 25 Gram(s) IV Push once  insulin glargine Injectable (LANTUS) 6 Unit(s) SubCutaneous at bedtime  insulin lispro (ADMELOG) corrective regimen sliding scale   SubCutaneous at bedtime  insulin lispro (ADMELOG) corrective regimen sliding scale   SubCutaneous three times a day before meals  midodrine 5 milliGRAM(s) Oral every 8 hours  mirtazapine 15 milliGRAM(s) Oral at bedtime  pantoprazole    Tablet 40 milliGRAM(s) Oral daily  phenylephrine    Infusion 0.1 MICROgram(s)/kG/Min (2.66 mL/Hr) IV Continuous <Continuous>  potassium chloride  10 mEq/100 mL IVPB 10 milliEquivalent(s) IV Intermittent every 1 hour  sertraline 50 milliGRAM(s) Oral daily    MEDICATIONS  (PRN):  acetaminophen     Tablet .. 650 milliGRAM(s) Oral every 6 hours PRN Temp greater or equal to 38C (100.4F), Mild Pain (1 - 3)  traMADol 25 milliGRAM(s) Oral every 12 hours PRN Moderate Pain (4 - 6)      Antibiotics History  cefepime   IVPB 500 milliGRAM(s) IV Intermittent daily, 06-24-22 @ 00:00  cefepime   IVPB 2000 milliGRAM(s) IV Intermittent once, 06-23-22 @ 11:48, Stop order after: 1 Doses  cefepime   IVPB    , 06-23-22 @ 18:23  piperacillin/tazobactam IVPB.. 3.375 Gram(s) IV Intermittent every 12 hours, 06-23-22 @ 15:57, Stop order after: 10 Days  vancomycin  IVPB 1000 milliGRAM(s) IV Intermittent Once, 06-23-22 @ 11:48, Stop order after: 1 Doses      Heme Medications       GI Medications  pantoprazole    Tablet 40 milliGRAM(s) Oral daily, 06-23-22 @ 18:35, Routine      COVID  06-23-22 @ 12:21  COVID -   [facility]  06-11-22 @ 18:10  COVID [facility]      COVID Biomarkers    06-25-22 @ 05:30 ESR --  ---  CRP --  ---  DDimer  --   ---   <H>   ---   Ferritin --    06-24-22 @ 22:40 ESR --  ---  CRP --  ---  DDimer  525<H>   ---   LDH --   ---   Ferritin --    06-11-22 @ 18:10 ESR --  ---  CRP --  ---  DDimer  1611<H>   ---   LDH --   ---   Ferritin --            Trend Cardiac Enzymes  06-23-22 @ 12:19  [id number]/Trop I - 946<H> - --  - --  -  --  /  --    Trend BNP    Procalcitonin Trend  06-11-22 @ 18:10   -   0.09<H>    WBC Trend  06-26-22 @ 05:30   -  20.19<H>  06-25-22 @ 05:30   -  20.84<H>  06-24-22 @ 22:40   -  18.89<H>  06-24-22 @ 12:15   -  21.54<H>  06-24-22 @ 06:10   -  23.73<H>  06-24-22 @ 01:20   -  21.72<H>    H/H Trend  06-26-22 @ 05:30   -   7.9<L>/ 23.0<L>  06-25-22 @ 05:30   -   7.2<L>/ 21.8<L>  06-24-22 @ 22:40   -   7.6<L>/ 21.8<L>  06-24-22 @ 12:15   -   7.3<L>/ 21.1<L>  06-24-22 @ 06:10   -   7.7<L>/ 22.9<L>  06-24-22 @ 01:20   -   7.5<L>/ 22.5<L>    Stool Occult Blood    Platelet Trend  06-26-22 @ 05:30   -  249  06-25-22 @ 05:30   -  418<H>  06-24-22 @ 22:40   -  284  06-24-22 @ 12:15   -  258  06-24-22 @ 06:10   -  307  06-24-22 @ 01:20   -  294    Trend Sodium  06-26-22 @ 05:30   -  146<H>  06-25-22 @ 17:15   -  144  06-25-22 @ 08:20   -  144  06-24-22 @ 12:15   -  141  06-24-22 @ 06:10   -  141  06-24-22 @ 01:20   -  138    Trend Potassium  06-26-22 @ 05:30   -  3.3<L>  06-25-22 @ 17:15   -  3.9  06-25-22 @ 08:20   -  2.9<LL>  06-24-22 @ 12:15   -  3.3<L>  06-24-22 @ 06:10   -  3.3<L>  06-24-22 @ 01:20   -  4.3    Trend Bun/Cr  06-26-22 @ 05:30  BUN/CR -  75<H> / 4.48<H>  06-25-22 @ 17:15  BUN/CR -  85<H> / 4.57<H>  06-25-22 @ 08:20  BUN/CR -  83<H> / 4.85<H>  06-24-22 @ 12:15  BUN/CR -  99<H> / 5.45<H>  06-24-22 @ 06:10  BUN/CR -  101<H> / 5.57<H>  06-24-22 @ 01:20  BUN/CR -  99<H> / 5.78<H>    Lactic Acid Trend  06-23-22 @ 12:19   -   1.3    ABG Trend    Trend AST/ALT/ALK Phos/Bili  06-26-22 @ 05:30   54<H>/27/200<H>/0.5  06-25-22 @ 17:15   73<H>/31/208<H>/0.4  06-25-22 @ 08:20   61<H>/26/172<H>/0.4  06-24-22 @ 12:15   68<H>/27/159<H>/0.3  06-23-22 @ 20:40   64<H>/23/125<H>/0.3  06-23-22 @ 12:19   81<H>/32/126<H>/0.4  06-12-22 @ 06:25   17/9<L>/75/0.3  06-11-22 @ 18:10   31/18/76/0.3        Albumin Trend  06-26-22 @ 05:30   -   1.4<L>  06-25-22 @ 17:15   -   1.7<L>  06-25-22 @ 08:20   -   1.5<L>  06-24-22 @ 12:15   -   1.9<L>  06-23-22 @ 20:40   -   1.5<L>  06-23-22 @ 12:19   -   2.0<L>      PTT - PT - INR Trend  06-26-22 @ 05:30   -   51.2<H> - 20.8<H> - 1.78<H>  06-25-22 @ 05:30   -   45.4<H> - 40.1<H> - 3.41<H>  06-24-22 @ 22:40   -   50.1<H> - 67.7<H> - 5.73<HH>  06-24-22 @ 06:10   -   91.5<H> - > 200<!> - >14.99<HH>  06-24-22 @ 01:20   -   98.9<H> - > 200<!> - >15<!!>  06-17-22 @ 06:25   -   49.0<H> - 53.7<H> - 4.56<H>    Glucose Trend  06-26-22 @ 09:48   -  -- -- 179<H>  06-26-22 @ 05:30   -  -- 172<H> --  06-25-22 @ 22:28   -  -- -- 151<H>  06-25-22 @ 17:39   -  -- -- 173<H>  06-25-22 @ 17:15   -  -- 153<H> --  06-25-22 @ 11:41   -  -- -- 97  06-25-22 @ 08:29   -  -- -- 126<H>  06-25-22 @ 08:20   -  -- 63<L> --  06-25-22 @ 07:59   -  -- -- 66<L>  06-25-22 @ 07:56   -  -- -- 62<L>    A1C with Estimated Average Glucose Result: 6.1 % *H* [4.0 - 5.6] (06-12-22 @ 06:25)      LABS:                        7.9    20.19 )-----------( 249      ( 26 Jun 2022 05:30 )             23.0     06-26    146<H>  |  109<H>  |  75<H>  ----------------------------<  172<H>  3.3<L>   |  28  |  4.48<H>    Ca    7.7<L>      26 Jun 2022 05:30  Phos  3.0     06-26  Mg     2.5     06-26    TPro  5.4<L>  /  Alb  1.4<L>  /  TBili  0.5  /  DBili  x   /  AST  54<H>  /  ALT  27  /  AlkPhos  200<H>  06-26    HIT ab -- 06-24 @ 22:40  HIT ab EIA --  D Dimer -525   PT/INR - ( 26 Jun 2022 05:30 )   PT: 20.8 sec;   INR: 1.78 ratio      PTT - ( 26 Jun 2022 05:30 )  PTT:51.2 sec            CULTURES: (if applicable)    Culture - Urine (collected 06-23-22 @ 15:33)  Source: Clean Catch Clean Catch (Midstream)  Final Report (06-24-22 @ 22:42):    >=3 organisms. Probable collection contamination.    Culture - Blood (collected 06-23-22 @ 12:21)  Source: .Blood Blood-Peripheral  Preliminary Report (06-24-22 @ 16:01):    No growth to date.    Culture - Blood (collected 06-23-22 @ 12:21)  Source: .Blood Blood-Peripheral  Preliminary Report (06-24-22 @ 16:01):    No growth to date.    Culture - Urine (collected 06-16-22 @ 02:00)  Source: Clean Catch Clean Catch (Midstream)  Final Report (06-18-22 @ 17:41):    >100,000 CFU/ml Escherichia coli  Organism: Escherichia coli (06-18-22 @ 17:41)  Organism: Escherichia coli (06-18-22 @ 17:41)      -  Amikacin: S <=16      -  Amoxicillin/Clavulanic Acid: S <=8/4      -  Ampicillin: S <=8 These ampicillin results predict results for amoxicillin      -  Ampicillin/Sulbactam: S <=4/2 Enterobacter, Klebsiella aerogenes, Citrobacter, and Serratia may develop resistance during prolonged therapy (3-4 days)      -  Aztreonam: S <=4      -  Cefazolin: S <=2 (MIC_CL_[id number]) For uncomplicated UTI with K. pneumoniae, E. coli, or P. mirablis: LIONEL <=16 is sensitive and LIONEL >=32 is resistant. This also predicts results for oral agents cefaclor, cefdinir, cefpodoxime, cefprozil, cefuroxime axetil, cephalexin and locarbef for uncomplicated UTI. Note that some isolates may be susceptible to these agents while testing resistant to cefazolin.      -  Cefepime: S <=2      -  Cefoxitin: S <=8      -  Ceftriaxone: S <=1 Enterobacter, Klebsiella aerogenes, Citrobacter, and Serratia may develop resistance during prolonged therapy      -  Ciprofloxacin: S <=0.25      -  Ertapenem: S <=0.5      -  Gentamicin: S <=2      -  Imipenem: S <=1      -  Levofloxacin: S <=0.5      -  Meropenem: S <=1      -  Nitrofurantoin: S <=32 Should not be used to treat pyelonephritis      -  Piperacillin/Tazobactam: S <=8      -  Tigecycline: S <=2      -  Tobramycin: S <=2      -  Trimethoprim/Sulfamethoxazole: S <=0.5/9.5      Method Type: LIONEL      Rapid RVP Result: Kaylah (06-23-22 @ 12:21)       VITALS:  T(C): 36.9 (06-26-22 @ 03:00), Max: 36.9 (06-25-22 @ 23:00)  T(F): 98.4 (06-26-22 @ 03:00), Max: 98.4 (06-25-22 @ 23:00)  HR: 89 (06-26-22 @ 06:00) (53 - 95)  BP: 157/78 (06-26-22 @ 06:00) (100/58 - 160/84)  BP(mean): 102 (06-26-22 @ 06:00) (71 - 106)  ABP: --  ABP(mean): --  RR: 11 (06-26-22 @ 06:00) (10 - 22)  SpO2: 99% (06-26-22 @ 06:00) (95% - 99%)  CVP(mm Hg): --  CVP(cm H2O): --    Ins and Outs     06-25-22 @ 07:01  -  06-26-22 @ 07:00  --------------------------------------------------------  IN: 2300 mL / OUT: 2525 mL / NET: -225 mL        Height (cm): 157.5 (06-23-22 @ 19:30)  Weight (kg): 61.1 (06-23-22 @ 19:30)  BMI (kg/m2): 24.6 (06-23-22 @ 19:30)        I&O's Detail    25 Jun 2022 07:01  -  26 Jun 2022 07:00  --------------------------------------------------------  IN:    dextrose 5% + lactated ringers: 2100 mL    Lactated Ringers: 200 mL  Total IN: 2300 mL    OUT:    Indwelling Catheter - Urethral (mL): 2525 mL  Total OUT: 2525 mL    Total NET: -225 mL

## 2022-06-27 LAB
ALBUMIN SERPL ELPH-MCNC: 1.6 G/DL — LOW (ref 3.3–5)
ALP SERPL-CCNC: 213 U/L — HIGH (ref 40–120)
ALT FLD-CCNC: 25 U/L — SIGNIFICANT CHANGE UP (ref 10–45)
ANION GAP SERPL CALC-SCNC: 9 MMOL/L — SIGNIFICANT CHANGE UP (ref 5–17)
AST SERPL-CCNC: 45 U/L — HIGH (ref 10–40)
BILIRUB SERPL-MCNC: 0.5 MG/DL — SIGNIFICANT CHANGE UP (ref 0.2–1.2)
BUN SERPL-MCNC: 64 MG/DL — HIGH (ref 7–23)
CALCIUM SERPL-MCNC: 8 MG/DL — LOW (ref 8.4–10.5)
CHLORIDE SERPL-SCNC: 109 MMOL/L — HIGH (ref 96–108)
CO2 SERPL-SCNC: 28 MMOL/L — SIGNIFICANT CHANGE UP (ref 22–31)
CREAT SERPL-MCNC: 3.92 MG/DL — HIGH (ref 0.5–1.3)
EGFR: 11 ML/MIN/1.73M2 — LOW
GLUCOSE BLDC GLUCOMTR-MCNC: 113 MG/DL — HIGH (ref 70–99)
GLUCOSE BLDC GLUCOMTR-MCNC: 119 MG/DL — HIGH (ref 70–99)
GLUCOSE BLDC GLUCOMTR-MCNC: 134 MG/DL — HIGH (ref 70–99)
GLUCOSE BLDC GLUCOMTR-MCNC: 142 MG/DL — HIGH (ref 70–99)
GLUCOSE SERPL-MCNC: 133 MG/DL — HIGH (ref 70–99)
HCT VFR BLD CALC: 27.2 % — LOW (ref 34.5–45)
HGB BLD-MCNC: 9.4 G/DL — LOW (ref 11.5–15.5)
MAGNESIUM SERPL-MCNC: 2.1 MG/DL — SIGNIFICANT CHANGE UP (ref 1.6–2.6)
MCHC RBC-ENTMCNC: 26.6 PG — LOW (ref 27–34)
MCHC RBC-ENTMCNC: 34.6 GM/DL — SIGNIFICANT CHANGE UP (ref 32–36)
MCV RBC AUTO: 77.1 FL — LOW (ref 80–100)
NRBC # BLD: 0 /100 WBCS — SIGNIFICANT CHANGE UP (ref 0–0)
PHOSPHATE SERPL-MCNC: 2.7 MG/DL — SIGNIFICANT CHANGE UP (ref 2.5–4.5)
PLATELET # BLD AUTO: 252 K/UL — SIGNIFICANT CHANGE UP (ref 150–400)
POTASSIUM SERPL-MCNC: 3.1 MMOL/L — LOW (ref 3.5–5.3)
POTASSIUM SERPL-SCNC: 3.1 MMOL/L — LOW (ref 3.5–5.3)
PROT SERPL-MCNC: 6.3 G/DL — SIGNIFICANT CHANGE UP (ref 6–8.3)
RBC # BLD: 3.53 M/UL — LOW (ref 3.8–5.2)
RBC # FLD: 17 % — HIGH (ref 10.3–14.5)
SODIUM SERPL-SCNC: 146 MMOL/L — HIGH (ref 135–145)
WBC # BLD: 17.6 K/UL — HIGH (ref 3.8–10.5)
WBC # FLD AUTO: 17.6 K/UL — HIGH (ref 3.8–10.5)

## 2022-06-27 PROCEDURE — 99232 SBSQ HOSP IP/OBS MODERATE 35: CPT

## 2022-06-27 PROCEDURE — 99233 SBSQ HOSP IP/OBS HIGH 50: CPT

## 2022-06-27 RX ORDER — POTASSIUM CHLORIDE 20 MEQ
10 PACKET (EA) ORAL
Refills: 0 | Status: COMPLETED | OUTPATIENT
Start: 2022-06-27 | End: 2022-06-27

## 2022-06-27 RX ORDER — APIXABAN 2.5 MG/1
5 TABLET, FILM COATED ORAL
Refills: 0 | Status: DISCONTINUED | OUTPATIENT
Start: 2022-06-27 | End: 2022-07-01

## 2022-06-27 RX ORDER — SENNA PLUS 8.6 MG/1
2 TABLET ORAL AT BEDTIME
Refills: 0 | Status: DISCONTINUED | OUTPATIENT
Start: 2022-06-27 | End: 2022-07-01

## 2022-06-27 RX ADMIN — PANTOPRAZOLE SODIUM 40 MILLIGRAM(S): 20 TABLET, DELAYED RELEASE ORAL at 11:41

## 2022-06-27 RX ADMIN — CEFEPIME 100 MILLIGRAM(S): 1 INJECTION, POWDER, FOR SOLUTION INTRAMUSCULAR; INTRAVENOUS at 11:41

## 2022-06-27 RX ADMIN — Medication 100 MILLIEQUIVALENT(S): at 14:49

## 2022-06-27 RX ADMIN — APIXABAN 2.5 MILLIGRAM(S): 2.5 TABLET, FILM COATED ORAL at 05:33

## 2022-06-27 RX ADMIN — APIXABAN 5 MILLIGRAM(S): 2.5 TABLET, FILM COATED ORAL at 17:08

## 2022-06-27 RX ADMIN — CHLORHEXIDINE GLUCONATE 1 APPLICATION(S): 213 SOLUTION TOPICAL at 05:33

## 2022-06-27 RX ADMIN — SERTRALINE 50 MILLIGRAM(S): 25 TABLET, FILM COATED ORAL at 11:41

## 2022-06-27 RX ADMIN — ATORVASTATIN CALCIUM 80 MILLIGRAM(S): 80 TABLET, FILM COATED ORAL at 22:11

## 2022-06-27 RX ADMIN — MIRTAZAPINE 15 MILLIGRAM(S): 45 TABLET, ORALLY DISINTEGRATING ORAL at 22:11

## 2022-06-27 RX ADMIN — Medication 5 MILLIGRAM(S): at 22:11

## 2022-06-27 RX ADMIN — Medication 100 MILLIEQUIVALENT(S): at 11:56

## 2022-06-27 RX ADMIN — SENNA PLUS 2 TABLET(S): 8.6 TABLET ORAL at 22:11

## 2022-06-27 RX ADMIN — INSULIN GLARGINE 6 UNIT(S): 100 INJECTION, SOLUTION SUBCUTANEOUS at 22:10

## 2022-06-27 NOTE — PROGRESS NOTE ADULT - SUBJECTIVE AND OBJECTIVE BOX
CC: f/u for presumed sepsis & MAXIMILIAN     Patient reports nothing. Asked for water & reports abd discomfort    REVIEW OF SYSTEMS:  Limited    Antimicrobials Day #  : 5  cefepime   IVPB 500 milliGRAM(s) IV Intermittent daily    Other Medications Reviewed    T(F): 97.8 (06-27-22 @ 13:14), Max: 98.6 (06-26-22 @ 21:00)  HR: 92 (06-27-22 @ 13:14)  BP: 168/81 (06-27-22 @ 13:14)  RR: 18 (06-27-22 @ 13:14)  SpO2: 98% (06-27-22 @ 13:14)  Wt(kg): --    PHYSICAL EXAM:  General: alert, no acute distress  Eyes:  anicteric, no conjunctival injection, no discharge  Oropharynx: no lesions or injection 	  Neck: supple,   Lungs: clear to auscultation  Heart: regular rate and rhythm; no murmurs  Abdomen: soft, nondistended, nontender, bowel sounds +  Skin: no lesions  Extremities: no clubbing, cyanosis, or edema  Neurologic: alert, oriented, left sided hemiparesis      LAB RESULTS:                        9.4    17.60 )-----------( 252      ( 27 Jun 2022 06:05 )             27.2     06-27    146<H>  |  109<H>  |  64<H>  ----------------------------<  133<H>  3.1<L>   |  28  |  3.92<H>    Ca    8.0<L>      27 Jun 2022 06:05  Phos  2.7     06-27  Mg     2.1     06-27    TPro  6.3  /  Alb  1.6<L>  /  TBili  0.5  /  DBili  x   /  AST  45<H>  /  ALT  25  /  AlkPhos  213<H>  06-27    LIVER FUNCTIONS - ( 27 Jun 2022 06:05 )  Alb: 1.6 g/dL / Pro: 6.3 g/dL / ALK PHOS: 213 U/L / ALT: 25 U/L / AST: 45 U/L / GGT: x             MICROBIOLOGY:  RECENT CULTURES:  06-23 @ 15:33 Clean Catch Clean Catch (Midstream)     >=3 organisms. Probable collection contamination.      06-23 @ 12:21 .Blood Blood-Peripheral     No growth to date.                RADIOLOGY REVIEWED:

## 2022-06-27 NOTE — PROGRESS NOTE ADULT - NS ATTEND AMEND GEN_ALL_CORE FT
Septic shock; resolved  UTI, Urinary retention  MAXIMILIAN/CKD3 with ATN. baseline Cr around 2.5   - renal, ID , urology on board  - cont current IV abx  - cont with rodriguez. pt may need to be DC on rodriguez Septic shock; resolved  UTI, Urinary retention  MAXIMILIAN/CKD3 with ATN. baseline Cr around 2.5   - renal, ID , urology on board  - cont current IV abx  - cont with rodriguez. pt may need to be DC on rodriguez  - Cr improving

## 2022-06-27 NOTE — PROGRESS NOTE ADULT - ASSESSMENT
Physical Examination:  GENERAL:               Alert, Oriented, No acute distress.    HEENT:                    No JVD, Dry MM  PULM:                     Bilateral air entry, Clear to auscultation bilaterally, no significant sputum production, No Rales, No Rhonchi, No Wheezing  CVS:                         S1, S2,  +Murmur  ABD:                        Soft, nondistended, nontender, normoactive bowel sounds,   EXT:                         mild edema, nontender, No Cyanosis or Clubbing   Vascular:                Warm Extremities, Normal Capillary refill, Normal Distal Pulses  NEURO:                  Alert, oriented, interactive, LHP , follows commands  PSYC:                      Calm, +Insight.    Assessment  Septic shock with UTI    possible urinary retention  MAXIMILIAN On CKD with baseline around Cr 2.5 Imporoving  DVT on eliquis  Elevated INR s/p VIt K , ? from sepsis  Abnormal CT: No evidence for a calculus in the kidneys or ureters. No hydronephrosis. Bilateral extrarenal pelvises. Apparent 1.7 cm heterogeneous left renal lesion with Hounsfield unit of 33 (image 53 series 2); if clinically indicated, abdominal MR without and with IV contrast may be pursued to rule out a solid malignant neoplasm.  Underlying Dementia, Depression, DM2 on insulin, h/o CVA with Left HP, CKD          Plan  Diet as per S&S  d/c IVF Off pressors  Antibiotics as per ID   Urology - " rodriguez draining, ? may best be managed with chronic rodriguez for recurrent retention s/p CVA"  Appreciate Renal and heme onc input  Restart Eliquis for dvt , started at 2.5 mg BID, if stable can go up on baseline dose in few days   f/u cultures  needs MRI when stable to evaluate  1.7 cm heterogeneous left renal lesion when stable  Supplement and monitor lytes    PMD:				                   Notified(Date):  Family Updated: 	Jalyn [phone]	                                 Date: 6/26/22 by ICU PA      continue care on medical floor  d/w Bedside CATIA wilalrd

## 2022-06-27 NOTE — PROGRESS NOTE ADULT - SUBJECTIVE AND OBJECTIVE BOX
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 1000 milliLiter(s) (100 mL/Hr) IV Continuous <Continuous>  dextrose 5%. 1000 milliLiter(s) (50 mL/Hr) IV Continuous <Continuous>  dextrose 50% Injectable 25 Gram(s) IV Push once  dextrose 50% Injectable 12.5 Gram(s) IV Push once  dextrose 50% Injectable 25 Gram(s) IV Push once  insulin glargine Injectable (LANTUS) 6 Unit(s) SubCutaneous at bedtime  insulin lispro (ADMELOG) corrective regimen sliding scale   SubCutaneous at bedtime  insulin lispro (ADMELOG) corrective regimen sliding scale   SubCutaneous three times a day before meals  mirtazapine 15 milliGRAM(s) Oral at bedtime  pantoprazole    Tablet 40 milliGRAM(s) Oral daily  potassium chloride  10 mEq/100 mL IVPB 10 milliEquivalent(s) IV Intermittent every 1 hour  sertraline 50 milliGRAM(s) Oral daily    MEDICATIONS  (PRN):  acetaminophen     Tablet .. 650 milliGRAM(s) Oral every 6 hours PRN Temp greater or equal to 38C (100.4F), Mild Pain (1 - 3)  traMADol 25 milliGRAM(s) Oral every 12 hours PRN Moderate Pain (4 - 6)      Antibiotics History  cefepime   IVPB 500 milliGRAM(s) IV Intermittent daily, 06-24-22 @ 00:00  cefepime   IVPB 2000 milliGRAM(s) IV Intermittent once, 06-23-22 @ 11:48, Stop order after: 1 Doses  cefepime   IVPB    , 06-23-22 @ 18:23  piperacillin/tazobactam IVPB.. 3.375 Gram(s) IV Intermittent every 12 hours, 06-23-22 @ 15:57, Stop order after: 10 Days  vancomycin  IVPB 1000 milliGRAM(s) IV Intermittent Once, 06-23-22 @ 11:48, Stop order after: 1 Doses      Heme Medications   apixaban 2.5 milliGRAM(s) Oral every 12 hours, 06-26-22 @ 10:26      GI Medications  pantoprazole    Tablet 40 milliGRAM(s) Oral daily, 06-23-22 @ 18:35, Routine        LABS:                        9.4    17.60 )-----------( 252      ( 27 Jun 2022 06:05 )             27.2     06-27    146<H>  |  109<H>  |  64<H>  ----------------------------<  133<H>  3.1<L>   |  28  |  3.92<H>    Ca    8.0<L>      27 Jun 2022 06:05  Phos  2.7     06-27  Mg     2.1     06-27    TPro  6.3  /  Alb  1.6<L>  /  TBili  0.5  /  DBili  x   /  AST  45<H>  /  ALT  25  /  AlkPhos  213<H>  06-27    HIT ab -- 06-24 @ 22:40  HIT ab EIA --  D Dimer -525    PT/INR - ( 26 Jun 2022 05:30 )   PT: 20.8 sec;   INR: 1.78 ratio         PTT - ( 26 Jun 2022 05:30 )  PTT:51.2 sec            CULTURES: (if applicable)    Culture - Urine (collected 06-23-22 @ 15:33)  Source: Clean Catch Clean Catch (Midstream)  Final Report (06-24-22 @ 22:42):    >=3 organisms. Probable collection contamination.    Culture - Blood (collected 06-23-22 @ 12:21)  Source: .Blood Blood-Peripheral  Preliminary Report (06-24-22 @ 16:01):    No growth to date.    Culture - Blood (collected 06-23-22 @ 12:21)  Source: .Blood Blood-Peripheral  Preliminary Report (06-24-22 @ 16:01):    No growth to date.    Culture - Urine (collected 06-16-22 @ 02:00)  Source: Clean Catch Clean Catch (Midstream)  Final Report (06-18-22 @ 17:41):    >100,000 CFU/ml Escherichia coli  Organism: Escherichia coli (06-18-22 @ 17:41)  Organism: Escherichia coli (06-18-22 @ 17:41)      -  Amikacin: S <=16      -  Amoxicillin/Clavulanic Acid: S <=8/4      -  Ampicillin: S <=8 These ampicillin results predict results for amoxicillin      -  Ampicillin/Sulbactam: S <=4/2 Enterobacter, Klebsiella aerogenes, Citrobacter, and Serratia may develop resistance during prolonged therapy (3-4 days)      -  Aztreonam: S <=4      -  Cefazolin: S <=2 (MIC_CL_COM_ENTERIC_CEFAZU) For uncomplicated UTI with K. pneumoniae, E. coli, or P. mirablis: LIONEL <=16 is sensitive and LIONEL >=32 is resistant. This also predicts results for oral agents cefaclor, cefdinir, cefpodoxime, cefprozil, cefuroxime axetil, cephalexin and locarbef for uncomplicated UTI. Note that some isolates may be susceptible to these agents while testing resistant to cefazolin.      -  Cefepime: S <=2      -  Cefoxitin: S <=8      -  Ceftriaxone: S <=1 Enterobacter, Klebsiella aerogenes, Citrobacter, and Serratia may develop resistance during prolonged therapy      -  Ciprofloxacin: S <=0.25      -  Ertapenem: S <=0.5      -  Gentamicin: S <=2      -  Imipenem: S <=1      -  Levofloxacin: S <=0.5      -  Meropenem: S <=1      -  Nitrofurantoin: S <=32 Should not be used to treat pyelonephritis      -  Piperacillin/Tazobactam: S <=8      -  Tigecycline: S <=2      -  Tobramycin: S <=2      -  Trimethoprim/Sulfamethoxazole: S <=0.5/9.5      Method Type: LIONEL      Rapid RVP Result: NotDetec (06-23-22 @ 12:21)           VITALS:  T(C): 36.4 (06-27-22 @ 05:00), Max: 37 (06-26-22 @ 21:00)  T(F): 97.6 (06-27-22 @ 05:00), Max: 98.6 (06-26-22 @ 21:00)  HR: 91 (06-27-22 @ 05:00) (91 - 92)  BP: 169/84 (06-27-22 @ 05:00) (164/73 - 192/89)  BP(mean): 103 (06-26-22 @ 23:48) (103 - 103)  ABP: --  ABP(mean): --  RR: 17 (06-27-22 @ 05:00) (15 - 17)  SpO2: 100% (06-27-22 @ 05:00) (97% - 100%)  CVP(mm Hg): --  CVP(cm H2O): --    Ins and Outs     06-26-22 @ 07:01  -  06-27-22 @ 07:00  --------------------------------------------------------  IN: 0 mL / OUT: 1900 mL / NET: -1900 mL                I&O's Detail    26 Jun 2022 07:01  -  27 Jun 2022 07:00  --------------------------------------------------------  IN:  Total IN: 0 mL    OUT:    Indwelling Catheter - Urethral (mL): 1900 mL  Total OUT: 1900 mL    Total NET: -1900 mL

## 2022-06-27 NOTE — PROGRESS NOTE ADULT - SUBJECTIVE AND OBJECTIVE BOX
Patient is a 72y old  Female who presents with a chief complaint of Sepsis (27 Jun 2022 07:39)      Patient seen and examined at bedside. No overnight events reported.     ALLERGIES:  No Known Allergies    MEDICATIONS  (STANDING):  apixaban 2.5 milliGRAM(s) Oral every 12 hours  atorvastatin 80 milliGRAM(s) Oral at bedtime  cefepime   IVPB 500 milliGRAM(s) IV Intermittent daily  chlorhexidine 2% Cloths 1 Application(s) Topical <User Schedule>  dextrose 5%. 1000 milliLiter(s) (100 mL/Hr) IV Continuous <Continuous>  dextrose 5%. 1000 milliLiter(s) (50 mL/Hr) IV Continuous <Continuous>  dextrose 50% Injectable 25 Gram(s) IV Push once  dextrose 50% Injectable 12.5 Gram(s) IV Push once  dextrose 50% Injectable 25 Gram(s) IV Push once  insulin glargine Injectable (LANTUS) 6 Unit(s) SubCutaneous at bedtime  insulin lispro (ADMELOG) corrective regimen sliding scale   SubCutaneous at bedtime  insulin lispro (ADMELOG) corrective regimen sliding scale   SubCutaneous three times a day before meals  mirtazapine 15 milliGRAM(s) Oral at bedtime  pantoprazole    Tablet 40 milliGRAM(s) Oral daily  potassium chloride  10 mEq/100 mL IVPB 10 milliEquivalent(s) IV Intermittent every 1 hour  sertraline 50 milliGRAM(s) Oral daily    MEDICATIONS  (PRN):  acetaminophen     Tablet .. 650 milliGRAM(s) Oral every 6 hours PRN Temp greater or equal to 38C (100.4F), Mild Pain (1 - 3)  traMADol 25 milliGRAM(s) Oral every 12 hours PRN Moderate Pain (4 - 6)    Vital Signs Last 24 Hrs  T(F): 97.6 (27 Jun 2022 05:00), Max: 98.6 (26 Jun 2022 21:00)  HR: 91 (27 Jun 2022 05:00) (91 - 92)  BP: 169/84 (27 Jun 2022 05:00) (164/73 - 192/89)  RR: 17 (27 Jun 2022 05:00) (15 - 17)  SpO2: 100% (27 Jun 2022 05:00) (97% - 100%)  I&O's Summary    26 Jun 2022 07:01  -  27 Jun 2022 07:00  --------------------------------------------------------  IN: 0 mL / OUT: 1900 mL / NET: -1900 mL      PHYSICAL EXAM:  General: NAD, A/O x 3  ENT: No gross hearing impairment, Moist mucous membranes, no thrush  Neck: Supple, No JVD  Lungs: Clear to auscultation bilaterally, good air entry, non-labored breathing  Cardio: RRR, S1/S2, No murmur  Abdomen: Soft, Nontender, Nondistended; Bowel sounds present  Extremities: No calf tenderness, No cyanosis, No pitting edema  Psych: Appropriate mood and affect    LABS:                        9.4    17.60 )-----------( 252      ( 27 Jun 2022 06:05 )             27.2     06-27    146  |  109  |  64  ----------------------------<  133  3.1   |  28  |  3.92    Ca    8.0      27 Jun 2022 06:05  Phos  2.7     06-27  Mg     2.1     06-27    TPro  6.3  /  Alb  1.6  /  TBili  0.5  /  DBili  x   /  AST  45  /  ALT  25  /  AlkPhos  213  06-27          PT/INR - ( 26 Jun 2022 05:30 )   PT: 20.8 sec;   INR: 1.78 ratio         PTT - ( 26 Jun 2022 05:30 )  PTT:51.2 sec          06-12 Chol 145 mg/dL LDL -- HDL 43 mg/dL Trig 83 mg/dL              POCT Blood Glucose.: 142 mg/dL (27 Jun 2022 11:59)  POCT Blood Glucose.: 113 mg/dL (27 Jun 2022 08:38)  POCT Blood Glucose.: 172 mg/dL (26 Jun 2022 21:32)  POCT Blood Glucose.: 173 mg/dL (26 Jun 2022 17:10)          Culture - Urine (collected 23 Jun 2022 15:33)  Source: Clean Catch Clean Catch (Midstream)  Final Report (24 Jun 2022 22:42):    >=3 organisms. Probable collection contamination.    Culture - Blood (collected 23 Jun 2022 12:21)  Source: .Blood Blood-Peripheral  Preliminary Report (24 Jun 2022 16:01):    No growth to date.    Culture - Blood (collected 23 Jun 2022 12:21)  Source: .Blood Blood-Peripheral  Preliminary Report (24 Jun 2022 16:01):    No growth to date.      COVID-19 PCR: NotDetec (06-11-22 @ 18:10)    RADIOLOGY & ADDITIONAL TESTS:    Care Discussed with Consultants/Other Providers:    Patient is a 72y old  Female who presents with a chief complaint of Sepsis (27 Jun 2022 07:39)    Patient seen and examined at bedside. No overnight events reported.  Pt. feeling "ok" today.  Per report pt constipated.    ALLERGIES:  No Known Allergies    MEDICATIONS  (STANDING):  apixaban 2.5 milliGRAM(s) Oral every 12 hours  atorvastatin 80 milliGRAM(s) Oral at bedtime  cefepime   IVPB 500 milliGRAM(s) IV Intermittent daily  chlorhexidine 2% Cloths 1 Application(s) Topical <User Schedule>  dextrose 5%. 1000 milliLiter(s) (100 mL/Hr) IV Continuous <Continuous>  dextrose 5%. 1000 milliLiter(s) (50 mL/Hr) IV Continuous <Continuous>  dextrose 50% Injectable 25 Gram(s) IV Push once  dextrose 50% Injectable 12.5 Gram(s) IV Push once  dextrose 50% Injectable 25 Gram(s) IV Push once  insulin glargine Injectable (LANTUS) 6 Unit(s) SubCutaneous at bedtime  insulin lispro (ADMELOG) corrective regimen sliding scale   SubCutaneous at bedtime  insulin lispro (ADMELOG) corrective regimen sliding scale   SubCutaneous three times a day before meals  mirtazapine 15 milliGRAM(s) Oral at bedtime  pantoprazole    Tablet 40 milliGRAM(s) Oral daily  potassium chloride  10 mEq/100 mL IVPB 10 milliEquivalent(s) IV Intermittent every 1 hour  sertraline 50 milliGRAM(s) Oral daily    MEDICATIONS  (PRN):  acetaminophen     Tablet .. 650 milliGRAM(s) Oral every 6 hours PRN Temp greater or equal to 38C (100.4F), Mild Pain (1 - 3)  traMADol 25 milliGRAM(s) Oral every 12 hours PRN Moderate Pain (4 - 6)    Vital Signs Last 24 Hrs  T(F): 97.6 (27 Jun 2022 05:00), Max: 98.6 (26 Jun 2022 21:00)  HR: 91 (27 Jun 2022 05:00) (91 - 92)  BP: 169/84 (27 Jun 2022 05:00) (164/73 - 192/89)  RR: 17 (27 Jun 2022 05:00) (15 - 17)  SpO2: 100% (27 Jun 2022 05:00) (97% - 100%)  I&O's Summary    26 Jun 2022 07:01  -  27 Jun 2022 07:00  --------------------------------------------------------  IN: 0 mL / OUT: 1900 mL / NET: -1900 mL      PHYSICAL EXAM:  General: NAD, A/O x 3  ENT: No gross hearing impairment, Moist mucous membranes, no thrush  Neck: Supple, No JVD  Lungs: Clear to auscultation bilaterally, good air entry, non-labored breathing  Cardio: RRR, S1/S2, No murmur  Abdomen: Soft, Nontender, mild distention, +bowel sounds present  Extremities: No calf tenderness, No cyanosis, No pitting edema  Neuro:  speech hypophonic, left upper ext contracted, left sided weakness    LABS:                        9.4    17.60 )-----------( 252      ( 27 Jun 2022 06:05 )             27.2     06-27    146  |  109  |  64  ----------------------------<  133  3.1   |  28  |  3.92    Ca    8.0      27 Jun 2022 06:05  Phos  2.7     06-27  Mg     2.1     06-27    TPro  6.3  /  Alb  1.6  /  TBili  0.5  /  DBili  x   /  AST  45  /  ALT  25  /  AlkPhos  213  06-27          PT/INR - ( 26 Jun 2022 05:30 )   PT: 20.8 sec;   INR: 1.78 ratio         PTT - ( 26 Jun 2022 05:30 )  PTT:51.2 sec          06-12 Chol 145 mg/dL LDL -- HDL 43 mg/dL Trig 83 mg/dL              POCT Blood Glucose.: 142 mg/dL (27 Jun 2022 11:59)  POCT Blood Glucose.: 113 mg/dL (27 Jun 2022 08:38)  POCT Blood Glucose.: 172 mg/dL (26 Jun 2022 21:32)  POCT Blood Glucose.: 173 mg/dL (26 Jun 2022 17:10)          Culture - Urine (collected 23 Jun 2022 15:33)  Source: Clean Catch Clean Catch (Midstream)  Final Report (24 Jun 2022 22:42):    >=3 organisms. Probable collection contamination.    Culture - Blood (collected 23 Jun 2022 12:21)  Source: .Blood Blood-Peripheral  Preliminary Report (24 Jun 2022 16:01):    No growth to date.    Culture - Blood (collected 23 Jun 2022 12:21)  Source: .Blood Blood-Peripheral  Preliminary Report (24 Jun 2022 16:01):    No growth to date.      COVID-19 PCR: NotDetec (06-11-22 @ 18:10)    RADIOLOGY & ADDITIONAL TESTS:  < from: CT Abdomen and Pelvis No Cont (06.23.22 @ 14:09) >    IMPRESSION:  No bowel obstruction or grossly thickened bowel wall. Colonic   diverticulosis without evidence of diverticulitis. Appendix within normal   limits. Moderate amount of stool in the rectum.    Small bilateral pleural effusions    Apparent 1.7 cm heterogeneous left renal lesion with Hounsfield unit of   33; if clinically indicated, abdominal MR without and with IV contrast   may be pursued to rule out a solid malignant neoplasm.    < end of copied text >    Care Discussed with Consultants/Other Providers:

## 2022-06-27 NOTE — PROGRESS NOTE ADULT - ASSESSMENT
72y female with a PMH of a remote CVA with left sided weakness, HTN, HLD, DM, hypothyroidism, CKD and recent 5 day [facility] admission for transient left sided neurological signs, sent out on Ceftin for E Coli UTI, who was admitted 6/23 with hypotension and an MAXIMILIAN.  Her creat had increased from 2.2 to 6.7, her wbc had increased to over 30,000.  She reportedly was not feeling well for a few days at , had abdominal pain, had received CTX and zosyn, was sent to ER 6/23 for evaluation.  She had rodriguez placed, over 500 cc urine, received fluids for BP in the 80's and antibiotics continued.  Found with nonspecific abdominal discomfort. She is presently on cefepime. She has also received vanco and zosyn.  Urosepsis, MAXIMILIAN, and urinary retention may be the unifying diagnosis.  It is hard to comprehend how she became so ill in such a short period of time.  Her admission cultures here may be impacted by SNF antibiotics.  Admission blood cultures are negative at 48 hours and her urine is polymicrobic, ? contaminated  She is responding to supportive measures and antibiotics, now off pressors and leukocytosis is moderating.  No positive blood cultures and urine specimen was contaminated.  The tentative diagnosis is urosepsis with obstruction.    Suggest:  1. MAXIMILIAN as per nephrology  2. Cefepime renally dosed  3. Follow cultures  4. Monitor wbc count and creatinine  5. Sampling urine may be problematic with rodriguez

## 2022-06-27 NOTE — CHART NOTE - NSCHARTNOTEFT_GEN_A_CORE
Nutrition Follow Up Note  Hospital Course (Per Electronic Medical Record):   Source: Medical Record [X] Patient [X]Nursing Staff [X]     Diet: Consistent Carbohydrate, DASH/TLC , Pureed with moderately thickened liquids , Low Potassium , Nepro TID     Patient s/p SLP eval diet consistency downgraded  due to ,delayed oral transit time suspected secondary to poor dentition.  patient tolerating , po intake remains poor due to decreased appetite ~ 25% as per flow sheet . PO supplements provided & encouraged . POCT reviewed . Patient with evidence of severe protein calorie malnutrition . Renal note reviewed , no acute dialytic indications at this time .     Current Weight: (6/27) 138.4/62.8kg                          (6/25) 135.1/61.3kg                          (6/24) 138/62.6kg       Pertinent Medications: MEDICATIONS  (STANDING):  apixaban 2.5 milliGRAM(s) Oral every 12 hours  atorvastatin 80 milliGRAM(s) Oral at bedtime  cefepime   IVPB 500 milliGRAM(s) IV Intermittent daily  chlorhexidine 2% Cloths 1 Application(s) Topical <User Schedule>  dextrose 5%. 1000 milliLiter(s) (100 mL/Hr) IV Continuous <Continuous>  dextrose 5%. 1000 milliLiter(s) (50 mL/Hr) IV Continuous <Continuous>  dextrose 50% Injectable 25 Gram(s) IV Push once  dextrose 50% Injectable 12.5 Gram(s) IV Push once  dextrose 50% Injectable 25 Gram(s) IV Push once  insulin glargine Injectable (LANTUS) 6 Unit(s) SubCutaneous at bedtime  insulin lispro (ADMELOG) corrective regimen sliding scale   SubCutaneous at bedtime  insulin lispro (ADMELOG) corrective regimen sliding scale   SubCutaneous three times a day before meals  mirtazapine 15 milliGRAM(s) Oral at bedtime  pantoprazole    Tablet 40 milliGRAM(s) Oral daily  sertraline 50 milliGRAM(s) Oral daily    MEDICATIONS  (PRN):  acetaminophen     Tablet .. 650 milliGRAM(s) Oral every 6 hours PRN Temp greater or equal to 38C (100.4F), Mild Pain (1 - 3)  traMADol 25 milliGRAM(s) Oral every 12 hours PRN Moderate Pain (4 - 6)      Pertinent Labs:  06-27 Na146 mmol/L<H> Glu 133 mg/dL<H> K+ 3.1 mmol/L<L> Cr  3.92 mg/dL<H> BUN 64 mg/dL<H> 06-27 Phos 2.7 mg/dL 06-27 Alb 1.6 g/dL<L> 06-12 Chol 145 mg/dL LDL --    HDL 43 mg/dL<L> Trig 83 mg/dL        Skin: (L) buttock DTI     Edema: none    Last BM: (6/25)     Estimated Needs:   [X] No Change since Previous Assessment      Previous Nutrition Diagnosis: Severe Protein Calorie Malnutrition      Nutrition Diagnosis is [X] Ongoing       New Nutrition Diagnosis: [X] Not Applicable      Interventions:   1. continue current diet   2. Encourage po intake     Monitoring & Evaluation: will monitor:  [X] Weights   [X] PO Intake   [X] Follow Up (Per Protocol)  [X] Tolerance to Diet Prescription       RD to follow as per Nutrition protocol  Sakina Horn RDN Nutrition Follow Up Note  Hospital Course (Per Electronic Medical Record):   Source: Medical Record [X] Patient [X]Nursing Staff [X]     Diet: Consistent Carbohydrate, DASH/TLC , Pureed with moderately thickened liquids , Low Potassium , Nepro TID     Patient s/p SLP eval diet consistency downgraded  due to ,delayed oral transit time suspected secondary to poor dentition.  patient tolerating , po intake remains poor due to decreased appetite ~ 25% as per flow sheet . PO supplements provided & encouraged . POCT reviewed . Patient with evidence of severe protein calorie malnutrition . Renal note reviewed , no acute dialytic indications at this time . DNR/DNI noted ? EN feeds will follow up with MD regarding GOC     Current Weight: (6/27) 138.4/62.8kg                          (6/25) 135.1/61.3kg                          (6/24) 138/62.6kg       Pertinent Medications: MEDICATIONS  (STANDING):  apixaban 2.5 milliGRAM(s) Oral every 12 hours  atorvastatin 80 milliGRAM(s) Oral at bedtime  cefepime   IVPB 500 milliGRAM(s) IV Intermittent daily  chlorhexidine 2% Cloths 1 Application(s) Topical <User Schedule>  dextrose 5%. 1000 milliLiter(s) (100 mL/Hr) IV Continuous <Continuous>  dextrose 5%. 1000 milliLiter(s) (50 mL/Hr) IV Continuous <Continuous>  dextrose 50% Injectable 25 Gram(s) IV Push once  dextrose 50% Injectable 12.5 Gram(s) IV Push once  dextrose 50% Injectable 25 Gram(s) IV Push once  insulin glargine Injectable (LANTUS) 6 Unit(s) SubCutaneous at bedtime  insulin lispro (ADMELOG) corrective regimen sliding scale   SubCutaneous at bedtime  insulin lispro (ADMELOG) corrective regimen sliding scale   SubCutaneous three times a day before meals  mirtazapine 15 milliGRAM(s) Oral at bedtime  pantoprazole    Tablet 40 milliGRAM(s) Oral daily  sertraline 50 milliGRAM(s) Oral daily    MEDICATIONS  (PRN):  acetaminophen     Tablet .. 650 milliGRAM(s) Oral every 6 hours PRN Temp greater or equal to 38C (100.4F), Mild Pain (1 - 3)  traMADol 25 milliGRAM(s) Oral every 12 hours PRN Moderate Pain (4 - 6)      Pertinent Labs:  06-27 Na146 mmol/L<H> Glu 133 mg/dL<H> K+ 3.1 mmol/L<L> Cr  3.92 mg/dL<H> BUN 64 mg/dL<H> 06-27 Phos 2.7 mg/dL 06-27 Alb 1.6 g/dL<L> 06-12 Chol 145 mg/dL LDL --    HDL 43 mg/dL<L> Trig 83 mg/dL        Skin: (L) buttock DTI     Edema: none    Last BM: (6/25)     Estimated Needs:   [X] No Change since Previous Assessment      Previous Nutrition Diagnosis: Severe Protein Calorie Malnutrition      Nutrition Diagnosis is [X] Ongoing       New Nutrition Diagnosis: [X] Not Applicable      Interventions:   1. continue current diet   2. Encourage po intake     Monitoring & Evaluation: will monitor:  [X] Weights   [X] PO Intake   [X] Follow Up (Per Protocol)  [X] Tolerance to Diet Prescription       RD to follow as per Nutrition protocol  Sakina Horn RDN

## 2022-06-27 NOTE — PROGRESS NOTE ADULT - ASSESSMENT
Physical Examination:  GENERAL:               Alert, Oriented, No acute distress.    HEENT:                    No JVD, Dry MM  PULM:                     Bilateral air entry, Clear to auscultation bilaterally, no significant sputum production, No Rales, No Rhonchi, No Wheezing  CVS:                         S1, S2,  +Murmur  ABD:                        Soft, nondistended, nontender, normoactive bowel sounds,   EXT:                         mild edema, nontender, No Cyanosis or Clubbing   Vascular:                Warm Extremities, Normal Capillary refill, Normal Distal Pulses  NEURO:                  Alert, oriented, interactive, LHP , follows commands  PSYC:                      Calm, +Insight.    Assessment  Septic shock with UTI    possible urinary retention  MAXIMILIAN On CKD with baseline around Cr 2.5 Imporoving  DVT on eliquis  Elevated INR s/p VIt K , ? from sepsis  Abnormal CT: No evidence for a calculus in the kidneys or ureters. No hydronephrosis. Bilateral extrarenal pelvises. Apparent 1.7 cm heterogeneous left renal lesion with Hounsfield unit of 33 (image 53 series 2); if clinically indicated, abdominal MR without and with IV contrast may be pursued to rule out a solid malignant neoplasm.  Underlying Dementia, Depression, DM2 on insulin, h/o CVA with Left HP, CKD          Plan  Diet as per S&S  Continue IVF  Off pressors  Antibiotics as per ID     Urology - " rodriguez draining, ? may best be managed with chronic rodriguez for recurrent retention s/p CVA"  Appreciate Renal and heme onc input  Restart Eliquis for dvt         f/u cultures  needs MRI when stable to evaluate  1.7 cm heterogeneous left renal lesion when stable  Supplement and monitor lytes    PMD:				                   Notified(Date):  Family Updated: 	Jalyn [phone]	                                 Date: 6/26/22 by ICU PA      Sedation & Analgesia:	  Diet/Nutrition:		   Diet, Consistent Carbohydrate/No Snacks:   Soft and Bite Sized (SOFTBTSZ)  Supplement Feeding Modality:  Oral  Nepro Cans or Servings Per Day:  1       Frequency:  Three Times a day (06-24-22 @ 08:59) [Pending Verification By Attending]        GI PPx:			pantoprazole    Tablet 40 milliGRAM(s) Oral daily    DVT Ppx:		On hold    	RISK                                                          Points  	[ x] Previous VTE                                           	3  	[ ] Thrombophilia                                        	2  	[ x] Lower limb paralysis                              	2   	[ ] Current Cancer                                       	2   	[ ] Immobilization > 24 hrs                        	1  	[ ] ICU/CCU stay > 24 hours                       	1  	[x ] Age > 60                                                   	1  		Total:[6 ]      Activity:		    Head of Bed:               35-45 Deg  Glycemic Control:          albumin human 25% IVPB 100 milliLiter(s) IV Intermittent once  atorvastatin 80 milliGRAM(s) Oral at bedtime  dextrose 5%. 1000 milliLiter(s) IV Continuous <Continuous>  dextrose 5%. 1000 milliLiter(s) IV Continuous <Continuous>  dextrose 50% Injectable 25 Gram(s) IV Push once  dextrose 50% Injectable 12.5 Gram(s) IV Push once  dextrose 50% Injectable 25 Gram(s) IV Push once  insulin glargine Injectable (LANTUS) 6 Unit(s) SubCutaneous at bedtime  insulin lispro (ADMELOG) corrective regimen sliding scale   SubCutaneous at bedtime  insulin lispro (ADMELOG) corrective regimen sliding scale   SubCutaneous three times a day before meals  sodium bicarbonate  Infusion 0.265 mEq/kG/Hr IV Continuous <Continuous>      Lines: PIV  CENTRAL LINE: 	[ ] YES [ ] NO	                    LOCATION:   	                       DATE INSERTED:   	                    REMOVE:  [ ] YES [ ] NO    A-LINE:  	                [ ] YES [ ] NO                      LOCATION:   	                       DATE INSERTED: 		            REMOVE:  [ ] YES [ ] NO    RODRIGUEZ: 		        [ x] YES [ ] NO  		                                       DATE INSERTED:		            REMOVE:  [ ] YES [ x] NO   // suspect retention    Restraints were deemed necessary to prevent removal of life-sustaining devices [  ] YES   [  x  ]  NO    Disposition: Transfer to medical floor    Goals of Care:  DNR/I , molst completed by palliative 71 yo female with PMHx of CVA, TIA, CKD3, urinary retention transferred from ICU, s/p septic shock to medical floor.    Septic shock; resolved  UTI, Urinary retention  Leukocytosis  MAXIMILIAN/CKD3 with baseline around Cr 2.5   Left renal lesion  Hypokalemia, Hypernatremia  -cont antibx, day 5 of Cefepime  -BC with NGTD  -ID following; urine culture neg but pt was on antibx at SNF prior to admission  Abnormal CT: No evidence for a calculus in the kidneys or ureters. No hydronephrosis. Bilateral extrarenal pelvises. Apparent 1.7 cm heterogeneous left renal lesion--abdominal MR without and with IV contrast may be pursued to rule out a solid malignant neoplasm.  -cont rodriguez for now; Urology following  -Renal following; replete K  -follow bmp daily    DVT on eliquis  Elevated INR s/p Vit K , ? from sepsis  -INR improved   -restarted on Eliquis, change to 5 mg. bid today to cont treatment for new DVT  -Hemeonc following; ok for Eliquis    Underlying Dementia, Depression  -dementia mild, cont Remeron, Sertraline    hx of CVA with left sided weakness  -cont Apixaban and Statin    DM2 on insulin  HgbA1c 6.1  -cont Lantus 6 units daily  -ISS and accuchecks    DVT ppx - on Apixaban  Goals of Care:  DNR/I , molst completed by palliative    Dispo:     73 yo female with PMHx of CVA, TIA, CKD3, urinary retention transferred from ICU, s/p septic shock to medical floor.    Septic shock; resolved  UTI, Urinary retention  Leukocytosis  MAXIMILIAN/CKD3 with baseline around Cr 2.5   Left renal lesion  Hypokalemia, Hypernatremia  -cont antibx, day 5 of Cefepime  -BC with NGTD  -ID following; urine culture neg but pt was on antibx at SNF prior to admission  Abnormal CT: No evidence for a calculus in the kidneys or ureters. No hydronephrosis. Bilateral extrarenal pelvises. 1.7 cm heterogeneous left renal lesion--abdominal MR w/wo IV contrast to be considered to rule out a solid malignant neoplasm.  -cont rodriguez for now; Urology following  -Renal following; replete K  -follow bmp daily    DVT on eliquis  Elevated INR s/p Vit K , ? from sepsis  -INR improved   -restarted on Eliquis, change to 5 mg. bid today to cont treatment for new DVT  -Hemeonc following; ok for Eliquis    Underlying Dementia, Depression  -dementia mild, cont Remeron, Sertraline    hx of CVA with left sided weakness  -cont Apixaban and Statin    DM2 on insulin  HgbA1c 6.1  -cont Lantus 6 units daily  -ISS and accuchecks    DVT ppx - on Apixaban  Goals of Care:  DNR/I , molst completed by palliative    Dispo:  updated Sister, Valentine Vidal today on  plan of care.  Anticipate D/C in next 48-72 hrs.   73 yo female with PMHx of CVA, TIA, CKD3, urinary retention transferred from ICU, s/p septic shock to medical floor.    Septic shock; resolved  UTI, Urinary retention  Leukocytosis  MAXIMILIAN/CKD3 with ATN. baseline Cr around 2.5   Left renal lesion  Hypokalemia, Hypernatremia  -cont antibx, day 5 of Cefepime  -BC with NGTD  -ID following; urine culture neg but pt was on antibx at SNF prior to admission  Abnormal CT: No evidence for a calculus in the kidneys or ureters. No hydronephrosis. Bilateral extrarenal pelvises. 1.7 cm heterogeneous left renal lesion--abdominal MR w/wo IV contrast to be considered to rule out a solid malignant neoplasm.  -cont rodriguez for now; Urology following  -Renal following; replete K  -follow bmp daily    DVT on eliquis  Elevated INR s/p Vit K , ? from sepsis  -INR improved   -restarted on Eliquis, change to 5 mg. bid today to cont treatment for new DVT  -Hemeonc following; ok for Eliquis    Underlying Dementia, Depression  -dementia mild, cont Remeron, Sertraline    hx of CVA with left sided weakness  -cont Apixaban and Statin    DM2 on insulin  HgbA1c 6.1  -cont Lantus 6 units daily  -ISS and accuchecks    DVT ppx - on Apixaban  Goals of Care:  DNR/I , molst completed by palliative    Dispo:  updated Sister, Valentine Vidal today on  plan of care.  Anticipate D/C in next 48-72 hrs.

## 2022-06-27 NOTE — PROGRESS NOTE ADULT - SUBJECTIVE AND OBJECTIVE BOX
NEPHROLOGY PROGRESS NOTE    CHIEF COMPLAINT:  MAXIMILIAN/CKD    HPI:  Renal function recovering with good spontaneous urine output.    ROS:  no SOB    EXAM:  T(F): 97.8 (06-27-22 @ 13:14)  HR: 92 (06-27-22 @ 13:14)  BP: 168/81 (06-27-22 @ 13:14)  RR: 18 (06-27-22 @ 13:14)  SpO2: 98% (06-27-22 @ 13:14)    Conversant, in no apparent distress  Normal respiratory effort, lungs clear bilaterally  Heart RRR with no murmur, no peripheral edema         LABS                             9.4    17.60 )-----------( 252      ( 27 Jun 2022 06:05 )             27.2          06-27    146<H>  |  109<H>  |  64<H>  ----------------------------<  133<H>  3.1<L>   |  28  |  3.92<H>    Ca    8.0<L>      27 Jun 2022 06:05  Phos  2.7     06-27  Mg     2.1     06-27    TPro  6.3  /  Alb  1.6<L>  /  TBili  0.5  /  DBili  x   /  AST  45<H>  /  ALT  25  /  AlkPhos  213<H>  06-27           ASSESSMENT:  1.  MAXIMILIAN due to septic ATN, recovery phase  2.  Sepsis with presumed  source, resolved  3.  Metabolic acidosis, non lactic, resolved  4.  Hypokalemia, hypernatremia, mild    PLAN:  Potassium repleted  Remove dietary potassium restriction  May drink to thirst  Daily BMP, Mg

## 2022-06-27 NOTE — PROGRESS NOTE ADULT - SUBJECTIVE AND OBJECTIVE BOX
JENNIFER REBOLLEDO, 72y Female  MRN: [id number]  ATTENDING: Joseph Ng    HPI:  72F, nursing home resident, with PMHx CVA/TIA, dementia, depression, dyslipidemia, CDK, DM2, DVT, admitted at [facility] with sepsis; recently on antibiotics for UTI, discharged from [facility] 3 days ago on Eliquis and Ceftin.  While in nursing home patient started to present abdominal discomfort and hypotension.  Found with leukocytosis (WBC> 30K with myeloid shift).  In the emergency room she was screaming in pain.  CT abdomen showed no bowel obstruction, present colonic diverticulosis without diverticulitis, and a 1.7 cm left renal lesion (?  malignancy).  Additionally, patient found coagulopathic (INR>15); hematology consulted on etiology of coagulopathy.    MEDICATIONS:  acetaminophen     Tablet .. 650 milliGRAM(s) Oral every 6 hours PRN  atorvastatin 80 milliGRAM(s) Oral at bedtime  cefepime   IVPB 500 milliGRAM(s) IV Intermittent daily  chlorhexidine 2% Cloths 1 Application(s) Topical <User Schedule>  dextrose 5%. 1000 milliLiter(s) IV Continuous <Continuous>  dextrose 5%. 1000 milliLiter(s) IV Continuous <Continuous>  dextrose 50% Injectable 25 Gram(s) IV Push once  dextrose 50% Injectable 12.5 Gram(s) IV Push once  dextrose 50% Injectable 25 Gram(s) IV Push once  insulin glargine Injectable (LANTUS) 6 Unit(s) SubCutaneous at bedtime  insulin lispro (ADMELOG) corrective regimen sliding scale   SubCutaneous at bedtime  insulin lispro (ADMELOG) corrective regimen sliding scale   SubCutaneous three times a day before meals  lactated ringers. 1000 milliLiter(s) IV Continuous <Continuous>  magnesium sulfate  IVPB 2 Gram(s) IV Intermittent every 2 hours  midodrine 5 milliGRAM(s) Oral every 8 hours  mirtazapine 15 milliGRAM(s) Oral at bedtime  pantoprazole    Tablet 40 milliGRAM(s) Oral daily  phenylephrine    Infusion 0.1 MICROgram(s)/kG/Min IV Continuous <Continuous>  potassium chloride  10 mEq/100 mL IVPB 10 milliEquivalent(s) IV Intermittent every 1 hour  sertraline 50 milliGRAM(s) Oral daily  traMADol 25 milliGRAM(s) Oral every 12 hours PRN    All other medications reviewed.    SUBJECTIVE:  Much improved with antibiotics (D5 of cefepime today).  Appears comfortable, nontoxic  Hematologic picture improving    VITALS:  T(C): 36.4 (06-25-22 @ 05:00), Max: 36.9 (06-24-22 @ 22:00)  T(F): 97.6 (06-25-22 @ 05:00), Max: 98.4 (06-24-22 @ 22:00)  HR: 91 (06-25-22 @ 07:00) (89 - 126)  BP: 100/55 (06-25-22 @ 07:00) (84/48 - 131/58)    PHYSICAL EXAM:  Constitutional: alert, well developed  HEENT: normocephalic, anicteric sclerae, no mucositis or thrush  Respiratory: bilateral clear to auscultation anteriorly  Cardiovascular : S1, S2 regular, rhythmic, no murmurs, gallops or rubs  Abdomen: soft, distended, + normoactive BS, no palpable HS- megaly  Extremities: no tenderness;  -c/c/e, pulses equal bilaterally    LABS:  Platelet Count - Automated: 418 K/uL (06-25-22 @ 05:30)  Platelet Count - Automated: 284 K/uL (06-24-22 @ 22:40)  Platelet Count - Automated: 258 K/uL (06-24-22 @ 12:15)    Hemoglobin: 7.2 g/dL (06-25-22 @ 05:30)  Hemoglobin: 7.6 g/dL (06-24-22 @ 22:40)  Hemoglobin: 7.3 g/dL (06-24-22 @ 12:15)    WBC Count: 20.84 K/uL (06-25-22 @ 05:30)  WBC Count: 18.89 K/uL (06-24-22 @ 22:40)  WBC Count: 21.54 K/uL (06-24-22 @ 12:15)    (06-25) WBC: 20.84 K/uL,Hemoglobin: 7.2 g/dL, Hematocrit: 21.8 %,  Platelet: 418 K/uL  (06-25) Na: 144 mmol/L ; K: 2.9 mmol/L ; BUN: 83 mg/dL ; Cr: 4.85 mg/dL.    RADIOLOGY:  ACC: [id number] EXAM:  CT ABDOMEN AND PELVIS                          PROCEDURE DATE:  06/23/2022          INTERPRETATION:  CLINICAL INFORMATION: Abdominal pain    COMPARISON: None.    CONTRAST/COMPLICATIONS:  IV Contrast: NONE  Oral Contrast: NONE  Complications: None reported at time of study completion    PROCEDURE:  CT of the Abdomen and Pelvis was performed.  Sagittal and coronal reformats were performed.    FINDINGS: The examination is limited by lack of IV/oral contrast.  LOWER CHEST: Small bilateral pleural effusions. Mild bilateral   atelectasis. Prominence of the central pulmonary arteries, suggestive of   pulmonary arterial hypertension.    LIVER: Within normal limits.  BILE DUCTS: Normal caliber.  GALLBLADDER: Within normal limits.  SPLEEN: Within normal limits.  PANCREAS: Within normal limits.  ADRENALS: Nonspecific mild adrenal thickening bilaterally.  KIDNEYS/URETERS: No evidence for a calculus in the kidneys or ureters. No   hydronephrosis. Bilateral extrarenal pelvises. Apparent 1.7 cm   heterogeneous left renal lesion with Hounsfield unit of 33 (image 53   series 2); if clinically indicated, abdominal MR without and with IV   contrast may be pursued to rule out a solid malignant neoplasm.    BLADDER: Mckeon catheter in the urinary bladder which is collapsed.  REPRODUCTIVE ORGANS: The uterus is not visualized, probably surgically   absent.    BOWEL: No bowel obstruction or grossly thickened bowel wall. Colonic   diverticulosis without evidence of diverticulitis. Appendix within normal   limits. Moderate amount of stool in the rectum.  PERITONEUM: No free air or ascites.  VESSELS: Calcified atherosclerotic disease.  RETROPERITONEUM/LYMPH NODES: No lymphadenopathy.  ABDOMINAL WALL: Small fat-containing umbilical hernia.  BONES: Mild degenerative spondylosis.    IMPRESSION:  No bowel obstruction or grossly thickened bowel wall. Colonic   diverticulosis without evidence of diverticulitis. Appendix within normal   limits. Moderate amount of stool in the rectum.    Small bilateral pleural effusions    Apparent 1.7 cm heterogeneous left renal lesion with Hounsfield unit of   33; if clinically indicated, abdominal MR without and with IV contrast   may be pursued to rule out a solid malignant neoplasm.

## 2022-06-28 LAB
ANION GAP SERPL CALC-SCNC: 11 MMOL/L — SIGNIFICANT CHANGE UP (ref 5–17)
ANION GAP SERPL CALC-SCNC: 12 MMOL/L — SIGNIFICANT CHANGE UP (ref 5–17)
BUN SERPL-MCNC: 57 MG/DL — HIGH (ref 7–23)
BUN SERPL-MCNC: 64 MG/DL — HIGH (ref 7–23)
CALCIUM SERPL-MCNC: 7.3 MG/DL — LOW (ref 8.4–10.5)
CALCIUM SERPL-MCNC: 7.9 MG/DL — LOW (ref 8.4–10.5)
CHLORIDE SERPL-SCNC: 109 MMOL/L — HIGH (ref 96–108)
CHLORIDE SERPL-SCNC: 110 MMOL/L — HIGH (ref 96–108)
CO2 SERPL-SCNC: 21 MMOL/L — LOW (ref 22–31)
CO2 SERPL-SCNC: 27 MMOL/L — SIGNIFICANT CHANGE UP (ref 22–31)
CREAT SERPL-MCNC: 3.3 MG/DL — HIGH (ref 0.5–1.3)
CREAT SERPL-MCNC: 3.63 MG/DL — HIGH (ref 0.5–1.3)
CULTURE RESULTS: SIGNIFICANT CHANGE UP
EGFR: 13 ML/MIN/1.73M2 — LOW
EGFR: 14 ML/MIN/1.73M2 — LOW
GLUCOSE BLDC GLUCOMTR-MCNC: 155 MG/DL — HIGH (ref 70–99)
GLUCOSE BLDC GLUCOMTR-MCNC: 209 MG/DL — HIGH (ref 70–99)
GLUCOSE BLDC GLUCOMTR-MCNC: 78 MG/DL — SIGNIFICANT CHANGE UP (ref 70–99)
GLUCOSE BLDC GLUCOMTR-MCNC: 99 MG/DL — SIGNIFICANT CHANGE UP (ref 70–99)
GLUCOSE SERPL-MCNC: 166 MG/DL — HIGH (ref 70–99)
GLUCOSE SERPL-MCNC: 92 MG/DL — SIGNIFICANT CHANGE UP (ref 70–99)
GRAM STN FLD: SIGNIFICANT CHANGE UP
HCT VFR BLD CALC: 24.1 % — LOW (ref 34.5–45)
HGB BLD-MCNC: 8 G/DL — LOW (ref 11.5–15.5)
INR BLD: 1.97 RATIO — HIGH (ref 0.88–1.16)
MCHC RBC-ENTMCNC: 26.8 PG — LOW (ref 27–34)
MCHC RBC-ENTMCNC: 33.2 GM/DL — SIGNIFICANT CHANGE UP (ref 32–36)
MCV RBC AUTO: 80.6 FL — SIGNIFICANT CHANGE UP (ref 80–100)
METHOD TYPE: SIGNIFICANT CHANGE UP
NRBC # BLD: 0 /100 WBCS — SIGNIFICANT CHANGE UP (ref 0–0)
P AERUGINOSA DNA BLD POS NAA+NON-PROBE: SIGNIFICANT CHANGE UP
PLATELET # BLD AUTO: 245 K/UL — SIGNIFICANT CHANGE UP (ref 150–400)
POTASSIUM SERPL-MCNC: 2.7 MMOL/L — CRITICAL LOW (ref 3.5–5.3)
POTASSIUM SERPL-MCNC: 3.7 MMOL/L — SIGNIFICANT CHANGE UP (ref 3.5–5.3)
POTASSIUM SERPL-SCNC: 2.7 MMOL/L — CRITICAL LOW (ref 3.5–5.3)
POTASSIUM SERPL-SCNC: 3.7 MMOL/L — SIGNIFICANT CHANGE UP (ref 3.5–5.3)
PROTHROM AB SERPL-ACNC: 23 SEC — HIGH (ref 10.5–13.4)
RBC # BLD: 2.99 M/UL — LOW (ref 3.8–5.2)
RBC # FLD: 17.7 % — HIGH (ref 10.3–14.5)
SODIUM SERPL-SCNC: 141 MMOL/L — SIGNIFICANT CHANGE UP (ref 135–145)
SODIUM SERPL-SCNC: 149 MMOL/L — HIGH (ref 135–145)
SPECIMEN SOURCE: SIGNIFICANT CHANGE UP
WBC # BLD: 15.62 K/UL — HIGH (ref 3.8–10.5)
WBC # FLD AUTO: 15.62 K/UL — HIGH (ref 3.8–10.5)

## 2022-06-28 PROCEDURE — 99233 SBSQ HOSP IP/OBS HIGH 50: CPT

## 2022-06-28 PROCEDURE — 99232 SBSQ HOSP IP/OBS MODERATE 35: CPT

## 2022-06-28 RX ORDER — SODIUM CHLORIDE 9 MG/ML
1000 INJECTION, SOLUTION INTRAVENOUS
Refills: 0 | Status: DISCONTINUED | OUTPATIENT
Start: 2022-06-28 | End: 2022-06-28

## 2022-06-28 RX ORDER — POTASSIUM CHLORIDE 20 MEQ
20 PACKET (EA) ORAL ONCE
Refills: 0 | Status: DISCONTINUED | OUTPATIENT
Start: 2022-06-28 | End: 2022-06-28

## 2022-06-28 RX ORDER — POTASSIUM CHLORIDE 20 MEQ
10 PACKET (EA) ORAL
Refills: 0 | Status: COMPLETED | OUTPATIENT
Start: 2022-06-28 | End: 2022-06-28

## 2022-06-28 RX ORDER — POTASSIUM CHLORIDE 20 MEQ
20 PACKET (EA) ORAL ONCE
Refills: 0 | Status: COMPLETED | OUTPATIENT
Start: 2022-06-28 | End: 2022-06-28

## 2022-06-28 RX ORDER — DEXTROSE MONOHYDRATE, SODIUM CHLORIDE, AND POTASSIUM CHLORIDE 50; .745; 4.5 G/1000ML; G/1000ML; G/1000ML
1000 INJECTION, SOLUTION INTRAVENOUS
Refills: 0 | Status: DISCONTINUED | OUTPATIENT
Start: 2022-06-28 | End: 2022-06-29

## 2022-06-28 RX ADMIN — Medication 100 MILLIEQUIVALENT(S): at 13:09

## 2022-06-28 RX ADMIN — Medication 5 MILLIGRAM(S): at 22:13

## 2022-06-28 RX ADMIN — Medication 2: at 17:19

## 2022-06-28 RX ADMIN — ATORVASTATIN CALCIUM 80 MILLIGRAM(S): 80 TABLET, FILM COATED ORAL at 22:13

## 2022-06-28 RX ADMIN — DEXTROSE MONOHYDRATE, SODIUM CHLORIDE, AND POTASSIUM CHLORIDE 75 MILLILITER(S): 50; .745; 4.5 INJECTION, SOLUTION INTRAVENOUS at 17:02

## 2022-06-28 RX ADMIN — SERTRALINE 50 MILLIGRAM(S): 25 TABLET, FILM COATED ORAL at 13:11

## 2022-06-28 RX ADMIN — Medication 100 MILLIEQUIVALENT(S): at 08:40

## 2022-06-28 RX ADMIN — Medication 20 MILLIEQUIVALENT(S): at 13:10

## 2022-06-28 RX ADMIN — Medication 100 MILLIEQUIVALENT(S): at 10:37

## 2022-06-28 RX ADMIN — SODIUM CHLORIDE 75 MILLILITER(S): 9 INJECTION, SOLUTION INTRAVENOUS at 15:22

## 2022-06-28 RX ADMIN — INSULIN GLARGINE 6 UNIT(S): 100 INJECTION, SOLUTION SUBCUTANEOUS at 22:14

## 2022-06-28 RX ADMIN — CEFEPIME 100 MILLIGRAM(S): 1 INJECTION, POWDER, FOR SOLUTION INTRAMUSCULAR; INTRAVENOUS at 13:12

## 2022-06-28 RX ADMIN — APIXABAN 5 MILLIGRAM(S): 2.5 TABLET, FILM COATED ORAL at 04:58

## 2022-06-28 RX ADMIN — MIRTAZAPINE 15 MILLIGRAM(S): 45 TABLET, ORALLY DISINTEGRATING ORAL at 22:13

## 2022-06-28 RX ADMIN — PANTOPRAZOLE SODIUM 40 MILLIGRAM(S): 20 TABLET, DELAYED RELEASE ORAL at 13:11

## 2022-06-28 RX ADMIN — APIXABAN 5 MILLIGRAM(S): 2.5 TABLET, FILM COATED ORAL at 18:55

## 2022-06-28 NOTE — PROGRESS NOTE ADULT - NUTRITIONAL ASSESSMENT
This patient has been assessed with a concern for Malnutrition and has been determined to have a diagnosis/diagnoses of Severe protein-calorie malnutrition.    This patient is being managed with:   Diet Pureed-  Consistent Carbohydrate {Evening Snack}  DASH/TLC {Sodium & Cholesterol Restricted}  Moderately Thick Liquids (MODTHICKLIQS)  Supplement Feeding Modality:  Oral  Nepro Cans or Servings Per Day:  1       Frequency:  Three Times a day  Entered: Jun 27 2022  5:38PM

## 2022-06-28 NOTE — PROVIDER CONTACT NOTE (CRITICAL VALUE NOTIFICATION) - PERSON GIVING RESULT:
Sharath
lab/Sharath Aly
charles 
price
Sharath from the lab
Estephania - lab
Estephania -lab
Estephania- lab

## 2022-06-28 NOTE — PROGRESS NOTE ADULT - SUBJECTIVE AND OBJECTIVE BOX
Patient is a 72y old  Female who presents with a chief complaint of Sepsis (28 Jun 2022 08:20)      Patient seen and examined at bedside. No overnight events reported.  Pt. states she is hungry, no pain this morning.    ALLERGIES:  No Known Allergies    MEDICATIONS  (STANDING):  apixaban 5 milliGRAM(s) Oral two times a day  atorvastatin 80 milliGRAM(s) Oral at bedtime  bisacodyl 5 milliGRAM(s) Oral at bedtime  cefepime   IVPB 500 milliGRAM(s) IV Intermittent daily  chlorhexidine 2% Cloths 1 Application(s) Topical <User Schedule>  dextrose 5%. 1000 milliLiter(s) (75 mL/Hr) IV Continuous <Continuous>  dextrose 5%. 1000 milliLiter(s) (100 mL/Hr) IV Continuous <Continuous>  dextrose 5%. 1000 milliLiter(s) (50 mL/Hr) IV Continuous <Continuous>  dextrose 50% Injectable 12.5 Gram(s) IV Push once  dextrose 50% Injectable 25 Gram(s) IV Push once  insulin glargine Injectable (LANTUS) 6 Unit(s) SubCutaneous at bedtime  insulin lispro (ADMELOG) corrective regimen sliding scale   SubCutaneous at bedtime  insulin lispro (ADMELOG) corrective regimen sliding scale   SubCutaneous three times a day before meals  mirtazapine 15 milliGRAM(s) Oral at bedtime  pantoprazole    Tablet 40 milliGRAM(s) Oral daily  potassium chloride   Solution 20 milliEquivalent(s) Oral once  potassium chloride  10 mEq/100 mL IVPB 10 milliEquivalent(s) IV Intermittent every 1 hour  senna 2 Tablet(s) Oral at bedtime  sertraline 50 milliGRAM(s) Oral daily    MEDICATIONS  (PRN):  acetaminophen     Tablet .. 650 milliGRAM(s) Oral every 6 hours PRN Temp greater or equal to 38C (100.4F), Mild Pain (1 - 3)  traMADol 25 milliGRAM(s) Oral every 12 hours PRN Moderate Pain (4 - 6)    Vital Signs Last 24 Hrs  T(F): 98 (28 Jun 2022 05:40), Max: 98 (28 Jun 2022 05:40)  HR: 91 (28 Jun 2022 05:40) (88 - 92)  BP: 158/78 (28 Jun 2022 05:40) (137/84 - 168/81)  RR: 18 (28 Jun 2022 05:40) (15 - 18)  SpO2: 99% (28 Jun 2022 05:40) (98% - 99%)  I&O's Summary    27 Jun 2022 07:01  -  28 Jun 2022 07:00  --------------------------------------------------------  IN: 0 mL / OUT: 450 mL / NET: -450 mL      PHYSICAL EXAM:  General: NAD, A/O x 2  ENT: No gross hearing impairment, Moist mucous membranes, no thrush  Neck: Supple, No JVD  Lungs: Clear to auscultation bilaterally, good air entry, non-labored breathing  Cardio: RRR, S1/S2, No murmur  Abdomen: Soft, mildly distended; Bowel sounds present  Extremities: No calf tenderness, No cyanosis, No pitting edema  Neuro:  alert, speech hypophonic, left sided weakness    LABS:                        8.0    15.62 )-----------( 245      ( 28 Jun 2022 06:15 )             24.1     06-28    149  |  110  |  64  ----------------------------<  92  2.7   |  27  |  3.63    Ca    7.9      28 Jun 2022 06:15  Phos  2.7     06-27  Mg     2.1     06-27    TPro  6.3  /  Alb  1.6  /  TBili  0.5  /  DBili  x   /  AST  45  /  ALT  25  /  AlkPhos  213  06-27          PT/INR - ( 28 Jun 2022 06:15 )   PT: 23.0 sec;   INR: 1.97 ratio         PTT - ( 26 Jun 2022 05:30 )  PTT:51.2 sec          06-12 Chol 145 mg/dL LDL -- HDL 43 mg/dL Trig 83 mg/dL              POCT Blood Glucose.: 78 mg/dL (28 Jun 2022 08:17)  POCT Blood Glucose.: 119 mg/dL (27 Jun 2022 21:59)  POCT Blood Glucose.: 134 mg/dL (27 Jun 2022 17:05)  POCT Blood Glucose.: 142 mg/dL (27 Jun 2022 11:59)          Culture - Urine (collected 23 Jun 2022 15:33)  Source: Clean Catch Clean Catch (Midstream)  Final Report (24 Jun 2022 22:42):    >=3 organisms. Probable collection contamination.    Culture - Blood (collected 23 Jun 2022 12:21)  Source: .Blood Blood-Peripheral  Preliminary Report (24 Jun 2022 16:01):    No growth to date.    Culture - Blood (collected 23 Jun 2022 12:21)  Source: .Blood Blood-Peripheral  Preliminary Report (24 Jun 2022 16:01):    No growth to date.      COVID-19 PCR: NotDetec (06-11-22 @ 18:10)    RADIOLOGY & ADDITIONAL TESTS:    Care Discussed with Consultants/Other Providers:

## 2022-06-28 NOTE — PROGRESS NOTE ADULT - NS ATTEND AMEND GEN_ALL_CORE FT
Septic shock resolved.  UTI   - IV cefepime   - duration as per ID   - per urology, may need chronic rodriguez    ATN  - Cr improving   - renal on board    Hypernatremia  - IVF with D5W  - monitor BMP    1.7cm heterogeneous left renal lesion on CT abd   - however, US kidney 6/13/22 showed no evidence of mass on L kidney  - follow up with urology as outpatient   - will inform pt's sister of findings

## 2022-06-28 NOTE — PROGRESS NOTE ADULT - ASSESSMENT
73 yo female with PMHx of CVA, TIA, CKD3, urinary retention transferred from ICU, s/p septic shock to medical floor.    Septic shock; resolved  UTI, Urinary retention  Leukocytosis; improving   MAXIMILIAN/CKD3 with ATN. baseline Cr around 2.5   Left renal lesion  Hypokalemia, Hypernatremia  -cont antibx, day 6 of Cefepime  -BC with NGTD  -ID following; urine culture neg but pt was on antibx at SNF prior to admission  Abnormal CT: No evidence for a calculus in the kidneys or ureters. No hydronephrosis. Bilateral extrarenal pelvises. 1.7 cm heterogeneous left renal lesion--abdominal MR w/wo IV contrast to be considered to rule out a solid malignant neoplasm.  -cont rodriguez for now; Urology following  -Renal following; replete K  -follow bmp daily    DVT on eliquis  Elevated INR s/p Vit K , ? from sepsis  -INR improved, stop following per Heme   -restarted on Eliquis, cont treatment for new DVT  -Hemeonc following; ok for Eliquis    Anemia of chronic renal dz.  -cont to monitor  -no signs of gross bleeding  -Heme following    Underlying Dementia, Depression  -dementia mild, cont Remeron, Sertraline    hx of CVA with left sided weakness  -cont Apixaban and Statin    DM2 on insulin  HgbA1c 6.1  -cont Lantus 6 units daily  -ISS and accuchecks    DVT ppx - on Apixaban  Goals of Care:  DNR/I , MOLST completed by palliative    Dispo:  updated Sister, Valentine Vidal today on  plan of care.  Anticipate D/C in next 48-72 hrs.   73 yo female with PMHx of CVA, TIA, CKD3, urinary retention transferred from ICU, s/p septic shock to medical floor.    Septic shock; resolved  UTI, Urinary retention  Leukocytosis; improving   MAXIMILIAN/CKD3 with ATN. baseline Cr around 2.5   Left renal lesion  Hypokalemia, Hypernatremia  -cont antibx, day 6 of Cefepime  -BC with NGTD  -ID following; urine culture neg but pt was on antibx at SNF prior to admission  Abnormal CT: No evidence for a calculus in the kidneys or ureters. No hydronephrosis. Bilateral extrarenal pelvises. 1.7 cm heterogeneous left renal lesion--abdominal MR w/wo IV contrast to be considered to rule out a solid malignant neoplasm.  -cont rodriguez for now; Urology following  -Renal following; replete K  -follow bmp daily    DVT on eliquis  Elevated INR s/p Vit K , ? from sepsis  -INR improved, stop following per Heme   -restarted on Eliquis, cont treatment for new DVT  -Hemeonc following; ok for Eliquis    Anemia of chronic renal dz.  -cont to monitor  -no signs of gross bleeding  -Heme following    Underlying Dementia, Depression  -dementia mild, cont Remeron, Sertraline    hx of CVA with left sided weakness  -cont Apixaban and Statin    DM2 on insulin  HgbA1c 6.1  -cont Lantus 6 units daily  -ISS and accuchecks    DVT ppx - on Apixaban  Goals of Care:  DNR/I , MOLST completed by palliative    Dispo:  left telephone message with Sister, Valentine Vidal today on plan of care.  Anticipate D/C in next 48-72 hrs.   73 yo female with PMHx of CVA, TIA, CKD3, urinary retention transferred from ICU, s/p septic shock to medical floor.    Septic shock; resolved  UTI, Urinary retention  Leukocytosis; improving   MAXIMILIAN/CKD3 with ATN. baseline Cr around 2.5   Left renal lesion  Hypokalemia, Hypernatremia  -cont antibx, day 6 of Cefepime  -BC with NGTD  -ID following; urine culture neg but pt was on antibx at SNF prior to admission  Abnormal CT: No evidence for a calculus in the kidneys or ureters. No hydronephrosis. Bilateral extrarenal pelvises. 1.7 cm heterogeneous left renal lesion--abdominal MR w/wo IV contrast to be considered to rule out a solid malignant neoplasm.  -cont rodriguez for now; Urology following  -Renal following; replete K  -follow bmp daily    DVT on eliquis  Elevated INR s/p Vit K , ? from sepsis  -INR improved, stop following per Heme   -restarted on Eliquis, cont treatment for new DVT  -Hemeonc following; ok for Eliquis    Anemia of chronic renal dz.  -cont to monitor  -no signs of gross bleeding  -Heme following    Underlying Dementia, Depression  -dementia mild, cont Remeron, Sertraline    hx of CVA with left sided weakness  -cont Apixaban and Statin    DM2 on insulin  HgbA1c 6.1  -cont Lantus 6 units daily  -ISS and accuchecks    DVT ppx - on Apixaban  Goals of Care:  DNR/I , MOLST completed by palliative    Dispo: spoke with Sister, Valentine Vidal today; discussed possibility of left renal lesion being a malignancy and if she and the patient would like to pursue further testing such as MRI and possibility of a renal biopsy.  She will discuss with the patient.   Anticipate D/C in next 48-72 hrs.

## 2022-06-28 NOTE — PROGRESS NOTE ADULT - SUBJECTIVE AND OBJECTIVE BOX
CC: f/u for sepsis of  origin with Pseudomonas bacteremia     Patient reports abd discomfort    REVIEW OF SYSTEMS:  All other review of systems negative (Comprehensive ROS) except as above     Antimicrobials Day #  : 6  cefepime   IVPB 500 milliGRAM(s) IV Intermittent daily    Other Medications Reviewed    T(F): 97.5 (06-28-22 @ 12:54), Max: 98 (06-28-22 @ 05:40)  HR: 106 (06-28-22 @ 12:54)  BP: 142/75 (06-28-22 @ 12:54)  RR: 18 (06-28-22 @ 12:54)  SpO2: 99% (06-28-22 @ 12:54)  Wt(kg): --    PHYSICAL EXAM:  General: alert, no acute distress  Eyes:  anicteric, no conjunctival injection, no discharge  Neck: supple,   Lungs: clear to auscultation  Heart: regular rate and rhythm; no murmurs  Abdomen: soft, nondistended, nontender, bowel sounds +  Skin: no lesions  Extremities: no clubbing, cyanosis, or edema  Neurologic: alert, oriented, left sided hemiparesis    LAB RESULTS:                        8.0    15.62 )-----------( 245      ( 28 Jun 2022 06:15 )             24.1     06-28    141  |  109<H>  |  57<H>  ----------------------------<  166<H>  3.7   |  21<L>  |  3.30<H>    Ca    7.3<L>      28 Jun 2022 15:35  Phos  2.7     06-27  Mg     2.1     06-27    TPro  6.3  /  Alb  1.6<L>  /  TBili  0.5  /  DBili  x   /  AST  45<H>  /  ALT  25  /  AlkPhos  213<H>  06-27    LIVER FUNCTIONS - ( 27 Jun 2022 06:05 )  Alb: 1.6 g/dL / Pro: 6.3 g/dL / ALK PHOS: 213 U/L / ALT: 25 U/L / AST: 45 U/L / GGT: x             MICROBIOLOGY:  RECENT CULTURES:        RADIOLOGY REVIEWED:

## 2022-06-28 NOTE — PROVIDER CONTACT NOTE (CRITICAL VALUE NOTIFICATION) - BACKGROUND
Pt received 10mg Vit K during the day 6/24.
pt admitted with acute renal failure
Pt admitted with Acute renal Failure
Admitted with MAXIMILIAN, Urosepsis
Pt on eliquis 5mg q12.  Dose last evening held due to hematuria

## 2022-06-28 NOTE — PROGRESS NOTE ADULT - ASSESSMENT
72y female with a PMH of a remote CVA with left sided weakness, HTN, HLD, DM, hypothyroidism, CKD and recent 5 day [facility] admission for transient left sided neurological signs, sent out on Ceftin for E Coli UTI, who was admitted 6/23 with hypotension and an MAXIMILIAN.  Her creat had increased from 2.2 to 6.7, her wbc had increased to over 30,000.  She reportedly was not feeling well for a few days at , had abdominal pain, had received CTX and zosyn, was sent to ER 6/23 for evaluation.  She had rodriguez placed, over 500 cc urine, received fluids for BP in the 80's and antibiotics continued.  Found with nonspecific abdominal discomfort. She is presently on cefepime. She has also received vanco and zosyn.  Urosepsis, MAXIMILIAN, and urinary retention felt to be the unifying diagnosis.  It is hard to comprehend how she became so ill in such a short period of time.  Her admission cultures here may be impacted by SNF antibiotics.  Urine is polymicrobic, ? contaminated  She is responding to supportive measures and antibiotics, off pressors and leukocytosis is slowly moderating.  The tentative diagnosis is urosepsis with obstruction.  Blood cx from 6/23 - now with delayed recovery of Pseudomonas in 1/4 bottles    Suggest:  MAXIMILIAN as per nephrology  Cefepime renally dosed  Follow blood cultures for sensitivity data on Pseudomonas  Monitor wbc count and creatinine

## 2022-06-28 NOTE — PROGRESS NOTE ADULT - ASSESSMENT
Physical Examination:  GENERAL:               Alert, Oriented, No acute distress.    HEENT:                    No JVD, Dry MM  PULM:                     Bilateral air entry, No Rales, No Rhonchi, No Wheezing  CVS:                         S1, S2,  +Murmur  ABD:                        Soft, nondistended, nontender, normoactive bowel sounds,   EXT:                         mild edema, nontender, No Cyanosis or Clubbing   Vascular:                Warm Extremities, Normal Capillary refill, Normal Distal Pulses  NEURO:                  Alert, oriented, interactive, LHP , follows commands  PSYC:                      Calm, +Insight.    Assessment  Septic shock with UTI    possible urinary retention  MAXIMILIAN On CKD with baseline around Cr 2.5 Imporoving  DVT on eliquis  Elevated INR s/p VIt K , ? from sepsis  Abnormal CT: No evidence for a calculus in the kidneys or ureters. No hydronephrosis. Bilateral extrarenal pelvises. Apparent 1.7 cm heterogeneous left renal lesion with Hounsfield unit of 33 (image 53 series 2); if clinically indicated, abdominal MR without and with IV contrast may be pursued to rule out a solid malignant neoplasm.  Underlying Dementia, Depression, DM2 on insulin, h/o CVA with Left HP, CKD      Plan  Diet as per S&S  Antibiotics as per ID   Appreciate Renal and heme onc input  Cont. Eliquis and monitor for bleeding  f/u cultures  needs MRI when stable to evaluate  1.7 cm heterogeneous left renal lesion when stable  Supplement and monitor lytes  continue care on medical floor  Reconsult ICU if needed

## 2022-06-28 NOTE — PROGRESS NOTE ADULT - SUBJECTIVE AND OBJECTIVE BOX
NEPHROLOGY PROGRESS NOTE    CHIEF COMPLAINT:  MAXIMILIAN    HPI:  Renal function continues to slowly improve with UO 2 liters.     ROS:  no SOB    EXAM:  T(F): 97.5 (06-28-22 @ 12:54)  HR: 106 (06-28-22 @ 12:54)  BP: 142/75 (06-28-22 @ 12:54)  RR: 18 (06-28-22 @ 12:54)  SpO2: 99% (06-28-22 @ 12:54)    Conversant, in no apparent distress  Normal respiratory effort, lungs clear bilaterally  Heart RRR with no murmur, no peripheral edema         LABS                             8.0    15.62 )-----------( 245      ( 28 Jun 2022 06:15 )             24.1          06-28    149<H>  |  110<H>  |  64<H>  ----------------------------<  92  2.7<LL>   |  27  |  3.63<H>    Ca    7.9<L>      28 Jun 2022 06:15  Phos  2.7     06-27  Mg     2.1     06-27    TPro  6.3  /  Alb  1.6<L>  /  TBili  0.5  /  DBili  x   /  AST  45<H>  /  ALT  25  /  AlkPhos  213<H>  06-27             ASSESSMENT:  1.  MAXIMILIAN due to septic ATN, recovery phase  2.  Sepsis with presumed  source, resolved  3.  Metabolic acidosis, non lactic, resolved  4.  Hypokalemia, hypernatremia, worsening    PLAN:  Change IVF to D5W + KCl  Received 50 meq KCl today PO/IV

## 2022-06-28 NOTE — PROGRESS NOTE ADULT - SUBJECTIVE AND OBJECTIVE BOX
Follow-up Critical Care Progress Note  Chief Complaint : Acute renal failure    Comfortable, not in distress. Denies any pain    Allergies :No Known Allergies      PAST MEDICAL & SURGICAL HISTORY:  CVA (cerebrovascular accident)    Hyperlipidemia    Stage 1 chronic kidney disease    Hypothyroidism    Hemiplegia due to infarction of brain    History of hysterectomy        Medications:  MEDICATIONS  (STANDING):  apixaban 5 milliGRAM(s) Oral two times a day  atorvastatin 80 milliGRAM(s) Oral at bedtime  bisacodyl 5 milliGRAM(s) Oral at bedtime  cefepime   IVPB 500 milliGRAM(s) IV Intermittent daily  chlorhexidine 2% Cloths 1 Application(s) Topical <User Schedule>  dextrose 5%. 1000 milliLiter(s) (75 mL/Hr) IV Continuous <Continuous>  dextrose 5%. 1000 milliLiter(s) (100 mL/Hr) IV Continuous <Continuous>  dextrose 5%. 1000 milliLiter(s) (50 mL/Hr) IV Continuous <Continuous>  dextrose 50% Injectable 12.5 Gram(s) IV Push once  dextrose 50% Injectable 25 Gram(s) IV Push once  insulin glargine Injectable (LANTUS) 6 Unit(s) SubCutaneous at bedtime  insulin lispro (ADMELOG) corrective regimen sliding scale   SubCutaneous at bedtime  insulin lispro (ADMELOG) corrective regimen sliding scale   SubCutaneous three times a day before meals  mirtazapine 15 milliGRAM(s) Oral at bedtime  pantoprazole    Tablet 40 milliGRAM(s) Oral daily  potassium chloride   Solution 20 milliEquivalent(s) Oral once  potassium chloride  10 mEq/100 mL IVPB 10 milliEquivalent(s) IV Intermittent every 1 hour  senna 2 Tablet(s) Oral at bedtime  sertraline 50 milliGRAM(s) Oral daily    MEDICATIONS  (PRN):  acetaminophen     Tablet .. 650 milliGRAM(s) Oral every 6 hours PRN Temp greater or equal to 38C (100.4F), Mild Pain (1 - 3)  traMADol 25 milliGRAM(s) Oral every 12 hours PRN Moderate Pain (4 - 6)      Antibiotics History  cefepime   IVPB 500 milliGRAM(s) IV Intermittent daily, 06-24-22 @ 00:00  cefepime   IVPB 2000 milliGRAM(s) IV Intermittent once, 06-23-22 @ 11:48, Stop order after: 1 Doses  cefepime   IVPB    , 06-23-22 @ 18:23  piperacillin/tazobactam IVPB.. 3.375 Gram(s) IV Intermittent every 12 hours, 06-23-22 @ 15:57, Stop order after: 10 Days  vancomycin  IVPB 1000 milliGRAM(s) IV Intermittent Once, 06-23-22 @ 11:48, Stop order after: 1 Doses      Heme Medications   apixaban 5 milliGRAM(s) Oral two times a day, 06-27-22 @ 13:20      GI Medications  bisacodyl 5 milliGRAM(s) Oral at bedtime, 06-27-22 @ 13:18, Routine  pantoprazole    Tablet 40 milliGRAM(s) Oral daily, 06-23-22 @ 18:35, Routine  senna 2 Tablet(s) Oral at bedtime, 06-27-22 @ 13:18, Routine        LABS:                        8.0    15.62 )-----------( 245      ( 28 Jun 2022 06:15 )             24.1     06-28    149<H>  |  110<H>  |  64<H>  ----------------------------<  92  2.7<LL>   |  27  |  3.63<H>    Ca    7.9<L>      28 Jun 2022 06:15  Phos  2.7     06-27  Mg     2.1     06-27    TPro  6.3  /  Alb  1.6<L>  /  TBili  0.5  /  DBili  x   /  AST  45<H>  /  ALT  25  /  AlkPhos  213<H>  06-27    HIT ab -- 06-24 @ 22:40  HIT ab EIA --  D Dimer -525    PT/INR - ( 28 Jun 2022 06:15 )   PT: 23.0 sec;   INR: 1.97 ratio      CULTURES: (if applicable)    Culture - Urine (collected 06-23-22 @ 15:33)  Source: Clean Catch Clean Catch (Midstream)  Final Report (06-24-22 @ 22:42):    >=3 organisms. Probable collection contamination.    Culture - Blood (collected 06-23-22 @ 12:21)  Source: .Blood Blood-Peripheral  Preliminary Report (06-24-22 @ 16:01):    No growth to date.    Culture - Blood (collected 06-23-22 @ 12:21)  Source: .Blood Blood-Peripheral  Preliminary Report (06-24-22 @ 16:01):    No growth to date.    Culture - Urine (collected 06-16-22 @ 02:00)  Source: Clean Catch Clean Catch (Midstream)  Final Report (06-18-22 @ 17:41):    >100,000 CFU/ml Escherichia coli  Organism: Escherichia coli (06-18-22 @ 17:41)  Organism: Escherichia coli (06-18-22 @ 17:41)      -  Amikacin: S <=16      -  Amoxicillin/Clavulanic Acid: S <=8/4      -  Ampicillin: S <=8 These ampicillin results predict results for amoxicillin      -  Ampicillin/Sulbactam: S <=4/2 Enterobacter, Klebsiella aerogenes, Citrobacter, and Serratia may develop resistance during prolonged therapy (3-4 days)      -  Aztreonam: S <=4      -  Cefazolin: S <=2 (MIC_CL_COM_ENTERIC_CEFAZU) For uncomplicated UTI with K. pneumoniae, E. coli, or P. mirablis: LIONEL <=16 is sensitive and LIONEL >=32 is resistant. This also predicts results for oral agents cefaclor, cefdinir, cefpodoxime, cefprozil, cefuroxime axetil, cephalexin and locarbef for uncomplicated UTI. Note that some isolates may be susceptible to these agents while testing resistant to cefazolin.      -  Cefepime: S <=2      -  Cefoxitin: S <=8      -  Ceftriaxone: S <=1 Enterobacter, Klebsiella aerogenes, Citrobacter, and Serratia may develop resistance during prolonged therapy      -  Ciprofloxacin: S <=0.25      -  Ertapenem: S <=0.5      -  Gentamicin: S <=2      -  Imipenem: S <=1      -  Levofloxacin: S <=0.5      -  Meropenem: S <=1      -  Nitrofurantoin: S <=32 Should not be used to treat pyelonephritis      -  Piperacillin/Tazobactam: S <=8      -  Tigecycline: S <=2      -  Tobramycin: S <=2      -  Trimethoprim/Sulfamethoxazole: S <=0.5/9.5      Method Type: LIONEL    Rapid RVP Result: NotDetec (06-23-22 @ 12:21)    POCT Blood Glucose.: 78 mg/dL (28 Jun 2022 08:17)    VITALS:  T(C): 36.7 (06-28-22 @ 05:40), Max: 36.7 (06-28-22 @ 05:40)  T(F): 98 (06-28-22 @ 05:40), Max: 98 (06-28-22 @ 05:40)  HR: 91 (06-28-22 @ 05:40) (88 - 92)  BP: 158/78 (06-28-22 @ 05:40) (137/84 - 168/81)  BP(mean): --  ABP: --  ABP(mean): --  RR: 18 (06-28-22 @ 05:40) (15 - 18)  SpO2: 99% (06-28-22 @ 05:40) (98% - 99%)  CVP(mm Hg): --  CVP(cm H2O): --    Ins and Outs     06-27-22 @ 07:01  -  06-28-22 @ 07:00  --------------------------------------------------------  IN: 0 mL / OUT: 450 mL / NET: -450 mL                I&O's Detail    27 Jun 2022 07:01  -  28 Jun 2022 07:00  --------------------------------------------------------  IN:  Total IN: 0 mL    OUT:    Indwelling Catheter - Urethral (mL): 450 mL  Total OUT: 450 mL    Total NET: -450 mL

## 2022-06-28 NOTE — PROGRESS NOTE ADULT - SUBJECTIVE AND OBJECTIVE BOX
JENNIFER REBOLLEDO, 72y Female  MRN: [id number]  ATTENDING: Joseph Ng    HPI:  72F, nursing home resident, with PMHx CVA/TIA, dementia, depression, dyslipidemia, CDK, DM2, DVT, admitted at [facility] with sepsis; recently on antibiotics for UTI, discharged from [facility] 3 days ago on Eliquis and Ceftin.  While in nursing home patient started to present abdominal discomfort and hypotension.  Found with leukocytosis (WBC> 30K with myeloid shift).  In the emergency room she was screaming in pain.  CT abdomen showed no bowel obstruction, present colonic diverticulosis without diverticulitis, and a 1.7 cm left renal lesion (?  malignancy).  Additionally, patient found coagulopathic (INR>15); hematology consulted on etiology of coagulopathy.    MEDICATIONS:  acetaminophen     Tablet .. 650 milliGRAM(s) Oral every 6 hours PRN  atorvastatin 80 milliGRAM(s) Oral at bedtime  cefepime   IVPB 500 milliGRAM(s) IV Intermittent daily  chlorhexidine 2% Cloths 1 Application(s) Topical <User Schedule>  dextrose 5%. 1000 milliLiter(s) IV Continuous <Continuous>  dextrose 5%. 1000 milliLiter(s) IV Continuous <Continuous>  dextrose 50% Injectable 25 Gram(s) IV Push once  dextrose 50% Injectable 12.5 Gram(s) IV Push once  dextrose 50% Injectable 25 Gram(s) IV Push once  insulin glargine Injectable (LANTUS) 6 Unit(s) SubCutaneous at bedtime  insulin lispro (ADMELOG) corrective regimen sliding scale   SubCutaneous at bedtime  insulin lispro (ADMELOG) corrective regimen sliding scale   SubCutaneous three times a day before meals  lactated ringers. 1000 milliLiter(s) IV Continuous <Continuous>  magnesium sulfate  IVPB 2 Gram(s) IV Intermittent every 2 hours  midodrine 5 milliGRAM(s) Oral every 8 hours  mirtazapine 15 milliGRAM(s) Oral at bedtime  pantoprazole    Tablet 40 milliGRAM(s) Oral daily  phenylephrine    Infusion 0.1 MICROgram(s)/kG/Min IV Continuous <Continuous>  potassium chloride  10 mEq/100 mL IVPB 10 milliEquivalent(s) IV Intermittent every 1 hour  sertraline 50 milliGRAM(s) Oral daily  traMADol 25 milliGRAM(s) Oral every 12 hours PRN    All other medications reviewed.    SUBJECTIVE:  Clinically much improved on antibiotics.  Appears stable, denies pain.  Persistent anemia, defervescing leukocytosis.    VITALS:  T(C): 36.4 (06-25-22 @ 05:00), Max: 36.9 (06-24-22 @ 22:00)  T(F): 97.6 (06-25-22 @ 05:00), Max: 98.4 (06-24-22 @ 22:00)  HR: 91 (06-25-22 @ 07:00) (89 - 126)  BP: 100/55 (06-25-22 @ 07:00) (84/48 - 131/58)    PHYSICAL EXAM:  Constitutional: alert, well developed  HEENT: normocephalic, anicteric sclerae, no mucositis or thrush  Respiratory: bilateral clear to auscultation anteriorly  Cardiovascular : S1, S2 regular, rhythmic, no murmurs, gallops or rubs  Abdomen: soft, distended, + normoactive BS, no palpable HS- megaly  Extremities: no tenderness;  -c/c/e, pulses equal bilaterally    LABS:  INR 1.97  Platelet Count - Automated: 418 K/uL (06-25-22 @ 05:30)  Platelet Count - Automated: 284 K/uL (06-24-22 @ 22:40)  Platelet Count - Automated: 258 K/uL (06-24-22 @ 12:15)    Hemoglobin: 7.2 g/dL (06-25-22 @ 05:30)  Hemoglobin: 7.6 g/dL (06-24-22 @ 22:40)  Hemoglobin: 7.3 g/dL (06-24-22 @ 12:15)    WBC Count: 20.84 K/uL (06-25-22 @ 05:30)  WBC Count: 18.89 K/uL (06-24-22 @ 22:40)  WBC Count: 21.54 K/uL (06-24-22 @ 12:15)    (06-25) WBC: 20.84 K/uL,Hemoglobin: 7.2 g/dL, Hematocrit: 21.8 %,  Platelet: 418 K/uL  (06-25) Na: 144 mmol/L ; K: 2.9 mmol/L ; BUN: 83 mg/dL ; Cr: 4.85 mg/dL.    RADIOLOGY:  ACC: [id number] EXAM:  CT ABDOMEN AND PELVIS                          PROCEDURE DATE:  06/23/2022          INTERPRETATION:  CLINICAL INFORMATION: Abdominal pain    COMPARISON: None.    CONTRAST/COMPLICATIONS:  IV Contrast: NONE  Oral Contrast: NONE  Complications: None reported at time of study completion    PROCEDURE:  CT of the Abdomen and Pelvis was performed.  Sagittal and coronal reformats were performed.    FINDINGS: The examination is limited by lack of IV/oral contrast.  LOWER CHEST: Small bilateral pleural effusions. Mild bilateral   atelectasis. Prominence of the central pulmonary arteries, suggestive of   pulmonary arterial hypertension.    LIVER: Within normal limits.  BILE DUCTS: Normal caliber.  GALLBLADDER: Within normal limits.  SPLEEN: Within normal limits.  PANCREAS: Within normal limits.  ADRENALS: Nonspecific mild adrenal thickening bilaterally.  KIDNEYS/URETERS: No evidence for a calculus in the kidneys or ureters. No   hydronephrosis. Bilateral extrarenal pelvises. Apparent 1.7 cm   heterogeneous left renal lesion with Hounsfield unit of 33 (image 53   series 2); if clinically indicated, abdominal MR without and with IV   contrast may be pursued to rule out a solid malignant neoplasm.    BLADDER: Mckeon catheter in the urinary bladder which is collapsed.  REPRODUCTIVE ORGANS: The uterus is not visualized, probably surgically   absent.    BOWEL: No bowel obstruction or grossly thickened bowel wall. Colonic   diverticulosis without evidence of diverticulitis. Appendix within normal   limits. Moderate amount of stool in the rectum.  PERITONEUM: No free air or ascites.  VESSELS: Calcified atherosclerotic disease.  RETROPERITONEUM/LYMPH NODES: No lymphadenopathy.  ABDOMINAL WALL: Small fat-containing umbilical hernia.  BONES: Mild degenerative spondylosis.    IMPRESSION:  No bowel obstruction or grossly thickened bowel wall. Colonic   diverticulosis without evidence of diverticulitis. Appendix within normal   limits. Moderate amount of stool in the rectum.    Small bilateral pleural effusions    Apparent 1.7 cm heterogeneous left renal lesion with Hounsfield unit of   33; if clinically indicated, abdominal MR without and with IV contrast   may be pursued to rule out a solid malignant neoplasm.

## 2022-06-29 LAB
ANION GAP SERPL CALC-SCNC: 10 MMOL/L — SIGNIFICANT CHANGE UP (ref 5–17)
BUN SERPL-MCNC: 48 MG/DL — HIGH (ref 7–23)
CALCIUM SERPL-MCNC: 7.1 MG/DL — LOW (ref 8.4–10.5)
CHLORIDE SERPL-SCNC: 106 MMOL/L — SIGNIFICANT CHANGE UP (ref 96–108)
CO2 SERPL-SCNC: 24 MMOL/L — SIGNIFICANT CHANGE UP (ref 22–31)
CREAT SERPL-MCNC: 3 MG/DL — HIGH (ref 0.5–1.3)
EGFR: 16 ML/MIN/1.73M2 — LOW
GLUCOSE BLDC GLUCOMTR-MCNC: 120 MG/DL — HIGH (ref 70–99)
GLUCOSE BLDC GLUCOMTR-MCNC: 157 MG/DL — HIGH (ref 70–99)
GLUCOSE BLDC GLUCOMTR-MCNC: 164 MG/DL — HIGH (ref 70–99)
GLUCOSE BLDC GLUCOMTR-MCNC: 172 MG/DL — HIGH (ref 70–99)
GLUCOSE SERPL-MCNC: 184 MG/DL — HIGH (ref 70–99)
HCT VFR BLD CALC: 25 % — LOW (ref 34.5–45)
HGB BLD-MCNC: 8.3 G/DL — LOW (ref 11.5–15.5)
MCHC RBC-ENTMCNC: 27.2 PG — SIGNIFICANT CHANGE UP (ref 27–34)
MCHC RBC-ENTMCNC: 33.2 GM/DL — SIGNIFICANT CHANGE UP (ref 32–36)
MCV RBC AUTO: 82 FL — SIGNIFICANT CHANGE UP (ref 80–100)
NRBC # BLD: 0 /100 WBCS — SIGNIFICANT CHANGE UP (ref 0–0)
PLATELET # BLD AUTO: 213 K/UL — SIGNIFICANT CHANGE UP (ref 150–400)
POTASSIUM SERPL-MCNC: 3 MMOL/L — LOW (ref 3.5–5.3)
POTASSIUM SERPL-SCNC: 3 MMOL/L — LOW (ref 3.5–5.3)
RBC # BLD: 3.05 M/UL — LOW (ref 3.8–5.2)
RBC # FLD: 17.8 % — HIGH (ref 10.3–14.5)
SODIUM SERPL-SCNC: 140 MMOL/L — SIGNIFICANT CHANGE UP (ref 135–145)
WBC # BLD: 17.05 K/UL — HIGH (ref 3.8–10.5)
WBC # FLD AUTO: 17.05 K/UL — HIGH (ref 3.8–10.5)

## 2022-06-29 PROCEDURE — 99233 SBSQ HOSP IP/OBS HIGH 50: CPT

## 2022-06-29 PROCEDURE — 99232 SBSQ HOSP IP/OBS MODERATE 35: CPT

## 2022-06-29 RX ORDER — LANOLIN ALCOHOL/MO/W.PET/CERES
3 CREAM (GRAM) TOPICAL ONCE
Refills: 0 | Status: COMPLETED | OUTPATIENT
Start: 2022-06-29 | End: 2022-06-29

## 2022-06-29 RX ORDER — DEXTROSE MONOHYDRATE, SODIUM CHLORIDE, AND POTASSIUM CHLORIDE 50; .745; 4.5 G/1000ML; G/1000ML; G/1000ML
1000 INJECTION, SOLUTION INTRAVENOUS
Refills: 0 | Status: DISCONTINUED | OUTPATIENT
Start: 2022-06-29 | End: 2022-07-01

## 2022-06-29 RX ORDER — POTASSIUM CHLORIDE 20 MEQ
40 PACKET (EA) ORAL ONCE
Refills: 0 | Status: DISCONTINUED | OUTPATIENT
Start: 2022-06-29 | End: 2022-06-29

## 2022-06-29 RX ORDER — POTASSIUM CHLORIDE 20 MEQ
40 PACKET (EA) ORAL ONCE
Refills: 0 | Status: COMPLETED | OUTPATIENT
Start: 2022-06-29 | End: 2022-06-29

## 2022-06-29 RX ADMIN — APIXABAN 5 MILLIGRAM(S): 2.5 TABLET, FILM COATED ORAL at 18:11

## 2022-06-29 RX ADMIN — SERTRALINE 50 MILLIGRAM(S): 25 TABLET, FILM COATED ORAL at 11:51

## 2022-06-29 RX ADMIN — APIXABAN 5 MILLIGRAM(S): 2.5 TABLET, FILM COATED ORAL at 05:40

## 2022-06-29 RX ADMIN — SENNA PLUS 2 TABLET(S): 8.6 TABLET ORAL at 21:17

## 2022-06-29 RX ADMIN — PANTOPRAZOLE SODIUM 40 MILLIGRAM(S): 20 TABLET, DELAYED RELEASE ORAL at 11:50

## 2022-06-29 RX ADMIN — ATORVASTATIN CALCIUM 80 MILLIGRAM(S): 80 TABLET, FILM COATED ORAL at 21:17

## 2022-06-29 RX ADMIN — DEXTROSE MONOHYDRATE, SODIUM CHLORIDE, AND POTASSIUM CHLORIDE 75 MILLILITER(S): 50; .745; 4.5 INJECTION, SOLUTION INTRAVENOUS at 11:48

## 2022-06-29 RX ADMIN — Medication 5 MILLIGRAM(S): at 21:23

## 2022-06-29 RX ADMIN — Medication 1: at 12:52

## 2022-06-29 RX ADMIN — CEFEPIME 100 MILLIGRAM(S): 1 INJECTION, POWDER, FOR SOLUTION INTRAMUSCULAR; INTRAVENOUS at 12:53

## 2022-06-29 RX ADMIN — DEXTROSE MONOHYDRATE, SODIUM CHLORIDE, AND POTASSIUM CHLORIDE 75 MILLILITER(S): 50; .745; 4.5 INJECTION, SOLUTION INTRAVENOUS at 09:17

## 2022-06-29 RX ADMIN — Medication 40 MILLIEQUIVALENT(S): at 11:49

## 2022-06-29 RX ADMIN — Medication 1: at 08:56

## 2022-06-29 RX ADMIN — INSULIN GLARGINE 6 UNIT(S): 100 INJECTION, SOLUTION SUBCUTANEOUS at 21:17

## 2022-06-29 RX ADMIN — CHLORHEXIDINE GLUCONATE 1 APPLICATION(S): 213 SOLUTION TOPICAL at 06:06

## 2022-06-29 RX ADMIN — MIRTAZAPINE 15 MILLIGRAM(S): 45 TABLET, ORALLY DISINTEGRATING ORAL at 21:17

## 2022-06-29 NOTE — PROGRESS NOTE ADULT - SUBJECTIVE AND OBJECTIVE BOX
CC: f/u for sepsis     Patient reports no complaints     REVIEW OF SYSTEMS:  All other review of systems negative (Comprehensive ROS)    Antimicrobials Day #  : 7  cefepime   IVPB 500 milliGRAM(s) IV Intermittent daily    Other Medications Reviewed    T(F): 97.7 (06-29-22 @ 13:00), Max: 98.3 (06-28-22 @ 21:00)  HR: 89 (06-29-22 @ 13:00)  BP: 132/79 (06-29-22 @ 13:00)  RR: 16 (06-29-22 @ 13:00)  SpO2: 100% (06-29-22 @ 13:00)  Wt(kg): --    PHYSICAL EXAM:  General: alert, no acute distress  Eyes:  anicteric, no conjunctival injection, no discharge  Neck: supple   Lungs: clear to auscultation  Heart: regular rate and rhythm; no murmurs  Abdomen: soft, nondistended, nontender, bowel sounds +  Skin: no lesions  Extremities: no clubbing or cyanosis. Bilateral edema  Neurologic: alert, oriented, left sided hemiparesis    LAB RESULTS:                        8.3    17.05 )-----------( 213      ( 29 Jun 2022 06:00 )             25.0     06-29    140  |  106  |  48<H>  ----------------------------<  184<H>  3.0<L>   |  24  |  3.00<H>    Ca    7.1<L>      29 Jun 2022 06:00        MICROBIOLOGY:  RECENT CULTURES:      RADIOLOGY REVIEWED:

## 2022-06-29 NOTE — PROGRESS NOTE ADULT - SUBJECTIVE AND OBJECTIVE BOX
Subjective: no complaints. Confused.       MEDICATIONS  (STANDING):  apixaban 5 milliGRAM(s) Oral two times a day  atorvastatin 80 milliGRAM(s) Oral at bedtime  bisacodyl 5 milliGRAM(s) Oral at bedtime  cefepime   IVPB 500 milliGRAM(s) IV Intermittent daily  chlorhexidine 2% Cloths 1 Application(s) Topical <User Schedule>  dextrose 5% with potassium chloride 20 mEq/L 1000 milliLiter(s) (75 mL/Hr) IV Continuous <Continuous>  dextrose 5%. 1000 milliLiter(s) (50 mL/Hr) IV Continuous <Continuous>  dextrose 5%. 1000 milliLiter(s) (100 mL/Hr) IV Continuous <Continuous>  dextrose 50% Injectable 12.5 Gram(s) IV Push once  dextrose 50% Injectable 25 Gram(s) IV Push once  insulin glargine Injectable (LANTUS) 6 Unit(s) SubCutaneous at bedtime  insulin lispro (ADMELOG) corrective regimen sliding scale   SubCutaneous at bedtime  insulin lispro (ADMELOG) corrective regimen sliding scale   SubCutaneous three times a day before meals  mirtazapine 15 milliGRAM(s) Oral at bedtime  pantoprazole    Tablet 40 milliGRAM(s) Oral daily  senna 2 Tablet(s) Oral at bedtime  sertraline 50 milliGRAM(s) Oral daily    MEDICATIONS  (PRN):  acetaminophen     Tablet .. 650 milliGRAM(s) Oral every 6 hours PRN Temp greater or equal to 38C (100.4F), Mild Pain (1 - 3)  traMADol 25 milliGRAM(s) Oral every 12 hours PRN Moderate Pain (4 - 6)          T(C): 36.6 (06-29-22 @ 05:22), Max: 36.8 (06-28-22 @ 21:00)  HR: 83 (06-29-22 @ 05:22) (83 - 106)  BP: 137/78 (06-29-22 @ 05:22) (137/78 - 142/75)  RR: 16 (06-29-22 @ 05:22) (16 - 18)  SpO2: 98% (06-29-22 @ 05:22) (98% - 99%)  Wt(kg): --        I&O's Detail    28 Jun 2022 07:01  -  29 Jun 2022 07:00  --------------------------------------------------------  IN:  Total IN: 0 mL    OUT:    Indwelling Catheter - Urethral (mL): 1600 mL  Total OUT: 1600 mL    Total NET: -1600 mL               PHYSICAL EXAM:    GENERAL: NAD  NECK: Supple, no inc in JVP  CHEST/LUNG: Clear  HEART: S1S2  ABDOMEN: Soft  EXTREMITIES:  no edema      LABS:  CBC Full  -  ( 29 Jun 2022 06:00 )  WBC Count : 17.05 K/uL  RBC Count : 3.05 M/uL  Hemoglobin : 8.3 g/dL  Hematocrit : 25.0 %  Platelet Count - Automated : 213 K/uL  Mean Cell Volume : 82.0 fl  Mean Cell Hemoglobin : 27.2 pg  Mean Cell Hemoglobin Concentration : 33.2 gm/dL  Auto Neutrophil # : x  Auto Lymphocyte # : x  Auto Monocyte # : x  Auto Eosinophil # : x  Auto Basophil # : x  Auto Neutrophil % : x  Auto Lymphocyte % : x  Auto Monocyte % : x  Auto Eosinophil % : x  Auto Basophil % : x    06-29    140  |  106  |  48<H>  ----------------------------<  184<H>  3.0<L>   |  24  |  3.00<H>    Ca    7.1<L>      29 Jun 2022 06:00      PT/INR - ( 28 Jun 2022 06:15 )   PT: 23.0 sec;   INR: 1.97 ratio             ASSESSMENT:  1.  MAXIMILIAN due to septic ATN, recovery phase  2.  Sepsis with presumed  source, resolved  3.  Metabolic acidosis, non lactic, resolved  4.  Hypokalemia, hypernatremia, worsening    PLAN:  Change IVF to 0.45% saline with 20meq of KCL at 75cc/h  KCL 40meq PO ordered   BMP, Mg tomorrow.

## 2022-06-29 NOTE — PROGRESS NOTE ADULT - ASSESSMENT
73 yo female with PMHx of CVA, TIA, CKD3, urinary retention transferred from ICU, s/p septic shock to medical floor.    Septic shock; resolved  UTI and Urinary retention  Pseudomonas bacteremia   Leukocytosis; improved but remained elevated   -cont antibx, day 7 of Cefepime  -BC on 6/23/22: pseudomonas  -follow up surveillance Blood cx  -urine culture contaminated  -ID following  -cont rodriguez. per urology, pt may benefit from chronic rodriguez for urinary retention due to CVA  -Renal following; replete K    MAXIMILIAN/CKD3 with ATN. baseline Cr around 2.5   Possible Left renal lesion  -MAXIMILIAN improving; Cr improved to 3.0 today   -Renal on board  -cont IVF  -Abnormal CT: No evidence for a calculus in the kidneys or ureters. No hydronephrosis. Bilateral extrarenal pelvises. 1.7 cm heterogeneous left renal lesion--abdominal MR w/wo IV contrast to be considered to rule out a solid malignant neoplasm.  -MRI abd as outpatient    Hypokalemia   -repleted. monitor BMP    DVT on eliquis  Elevated INR s/p Vit K , ? from sepsis  -INR improved, stop checking per Heme   -restarted on Eliquis, cont treatment for new DVT  -Hemeonc following; ok for Eliquis    Anemia of chronic renal dz.  -cont to monitor  -no signs of gross bleeding  -Heme following    Underlying Dementia, Depression  -dementia mild, cont Remeron, Sertraline    hx of CVA with left sided weakness  -cont Apixaban and Statin    DM2 on insulin  HgbA1c 6.1  -cont Lantus 6 units daily  -ISS and accuchecks    DVT ppx - on Apixaban  Goals of Care:  DNR/I , MOLST completed by palliative    Dispo: spoke with Sister, Valentine Vidal; discussed possibility of left renal lesion being a malignancy and if she and the patient would like to pursue further testing such as MRI and possibility of a renal biopsy.  She will discuss with the patient.   Anticipate D/C in next 48 hrs pending surveillance blood cx results.

## 2022-06-29 NOTE — PROGRESS NOTE ADULT - ASSESSMENT
72y female with a PMH of a remote CVA with left sided weakness, HTN, HLD, DM, hypothyroidism, CKD and recent 5 day [facility] admission for transient left sided neurological signs, sent out on Ceftin for E Coli UTI, who was admitted 6/23 with hypotension and an MAXIMILIAN.  Her creat had increased from 2.2 to 6.7, her wbc had increased to over 30,000.  She reportedly was not feeling well for a few days at , had abdominal pain, had received CTX and zosyn, was sent to ER 6/23 for evaluation.  She had rodriguez placed, over 500 cc urine, received fluids for BP in the 80's and antibiotics continued.  Found with nonspecific abdominal discomfort. She is presently on cefepime. She has also received vanco and zosyn.  Urosepsis, MAXIMILIAN, and urinary retention felt to be the unifying diagnosis.  It is hard to comprehend how she became so ill in such a short period of time.  Her admission cultures here may be impacted by SNF antibiotics.  Urine is polymicrobic, ? contaminated  She is responding to supportive measures and antibiotics, off pressors and leukocytosis is slowly moderating.  The tentative diagnosis is urosepsis with bacteremia from retention   Blood cx from 6/23 - with delayed recovery of Pseudomonas in 1/4 bottles    Suggest:  MAXIMILIAN as per nephrology  Cefepime renally dosed  Follow blood cultures for sensitivity data on Pseudomonas  Monitor wbc count and creatinine

## 2022-06-29 NOTE — PROGRESS NOTE ADULT - SUBJECTIVE AND OBJECTIVE BOX
Patient is a 72y old  Female who presents with a chief complaint of Sepsis (29 Jun 2022 11:00)      Subjective and overnight events:  Patient seen and examined at bedside. pt reports no complaints. no fever, chills, sob, cp, abd pain. tolerating diet     ALLERGIES:  No Known Allergies    MEDICATIONS  (STANDING):  apixaban 5 milliGRAM(s) Oral two times a day  atorvastatin 80 milliGRAM(s) Oral at bedtime  bisacodyl 5 milliGRAM(s) Oral at bedtime  cefepime   IVPB 500 milliGRAM(s) IV Intermittent daily  chlorhexidine 2% Cloths 1 Application(s) Topical <User Schedule>  dextrose 5%. 1000 milliLiter(s) (100 mL/Hr) IV Continuous <Continuous>  dextrose 5%. 1000 milliLiter(s) (50 mL/Hr) IV Continuous <Continuous>  dextrose 50% Injectable 12.5 Gram(s) IV Push once  dextrose 50% Injectable 25 Gram(s) IV Push once  insulin glargine Injectable (LANTUS) 6 Unit(s) SubCutaneous at bedtime  insulin lispro (ADMELOG) corrective regimen sliding scale   SubCutaneous three times a day before meals  insulin lispro (ADMELOG) corrective regimen sliding scale   SubCutaneous at bedtime  mirtazapine 15 milliGRAM(s) Oral at bedtime  pantoprazole    Tablet 40 milliGRAM(s) Oral daily  senna 2 Tablet(s) Oral at bedtime  sertraline 50 milliGRAM(s) Oral daily  sodium chloride 0.45% with potassium chloride 20 mEq/L 1000 milliLiter(s) (75 mL/Hr) IV Continuous <Continuous>    MEDICATIONS  (PRN):  acetaminophen     Tablet .. 650 milliGRAM(s) Oral every 6 hours PRN Temp greater or equal to 38C (100.4F), Mild Pain (1 - 3)  traMADol 25 milliGRAM(s) Oral every 12 hours PRN Moderate Pain (4 - 6)    Vital Signs Last 24 Hrs  T(F): 97.7 (29 Jun 2022 13:00), Max: 98.3 (28 Jun 2022 21:00)  HR: 89 (29 Jun 2022 13:00) (83 - 89)  BP: 132/79 (29 Jun 2022 13:00) (132/79 - 140/78)  RR: 16 (29 Jun 2022 13:00) (16 - 16)  SpO2: 100% (29 Jun 2022 13:00) (98% - 100%)  I&O's Summary    28 Jun 2022 07:01  -  29 Jun 2022 07:00  --------------------------------------------------------  IN: 0 mL / OUT: 1600 mL / NET: -1600 mL      PHYSICAL EXAM:  General: NAD, A/O x 3  ENT: MMM  Neck: Supple, No JVD  Lungs: Clear to auscultation bilaterally  Cardio: RRR, S1/S2, No murmurs  Abdomen: Soft, Nontender, Nondistended; Bowel sounds present  Extremities: No calf tenderness, No pitting edema    LABS:                        8.3    17.05 )-----------( 213      ( 29 Jun 2022 06:00 )             25.0     06-29    140  |  106  |  48  ----------------------------<  184  3.0   |  24  |  3.00    Ca    7.1      29 Jun 2022 06:00  Phos  2.7     06-27  Mg     2.1     06-27    TPro  6.3  /  Alb  1.6  /  TBili  0.5  /  DBili  x   /  AST  45  /  ALT  25  /  AlkPhos  213  06-27      PT/INR - ( 28 Jun 2022 06:15 )   PT: 23.0 sec;   INR: 1.97 ratio                 06-12 Chol 145 mg/dL LDL -- HDL 43 mg/dL Trig 83 mg/dL              POCT Blood Glucose.: 164 mg/dL (29 Jun 2022 12:05)  POCT Blood Glucose.: 172 mg/dL (29 Jun 2022 08:41)  POCT Blood Glucose.: 155 mg/dL (28 Jun 2022 21:10)  POCT Blood Glucose.: 209 mg/dL (28 Jun 2022 17:06)          Culture - Urine (collected 23 Jun 2022 15:33)  Source: Clean Catch Clean Catch (Midstream)  Final Report (24 Jun 2022 22:42):    >=3 organisms. Probable collection contamination.    Culture - Blood (collected 23 Jun 2022 12:21)  Source: .Blood Blood-Peripheral  Gram Stain (28 Jun 2022 14:39):    Growth in aerobic bottle: Gram Negative Rods  Preliminary Report (29 Jun 2022 12:54):    Growth in aerobic bottle: Pseudomonas aeruginosa    ***Blood Panel PCR results on this specimen are available    approximately 3 hours after the Gram stain result.***    Gram stain, PCR, and/or culture results may not always    correspond due to differencein methodologies.    ************************************************************    This PCR assay was performed by multiplex PCR. This    Assay tests for 66 bacterial and resistance gene targets.    Please refer to the [facility] test directory    at [email or web address] for details.  Organism: Blood Culture PCR (28 Jun 2022 16:44)  Organism: Blood Culture PCR (28 Jun 2022 16:44)      -  Pseudomonas aeruginosa: Detec      Method Type: PCR    Culture - Blood (collected 23 Jun 2022 12:21)  Source: .Blood Blood-Peripheral  Final Report (28 Jun 2022 16:01):    No Growth Final      COVID-19 PCR: NotDetec (06-11-22 @ 18:10)      RADIOLOGY & ADDITIONAL TESTS:    Care Discussed with Consultants/Other Providers:

## 2022-06-30 LAB
-  AMIKACIN: SIGNIFICANT CHANGE UP
-  AZTREONAM: SIGNIFICANT CHANGE UP
-  CEFEPIME: SIGNIFICANT CHANGE UP
-  CEFTAZIDIME: SIGNIFICANT CHANGE UP
-  CIPROFLOXACIN: SIGNIFICANT CHANGE UP
-  GENTAMICIN: SIGNIFICANT CHANGE UP
-  IMIPENEM: SIGNIFICANT CHANGE UP
-  LEVOFLOXACIN: SIGNIFICANT CHANGE UP
-  MEROPENEM: SIGNIFICANT CHANGE UP
-  PIPERACILLIN/TAZOBACTAM: SIGNIFICANT CHANGE UP
-  TOBRAMYCIN: SIGNIFICANT CHANGE UP
ANION GAP SERPL CALC-SCNC: 14 MMOL/L — SIGNIFICANT CHANGE UP (ref 5–17)
BASOPHILS # BLD AUTO: 0.04 K/UL — SIGNIFICANT CHANGE UP (ref 0–0.2)
BASOPHILS NFR BLD AUTO: 0.3 % — SIGNIFICANT CHANGE UP (ref 0–2)
BUN SERPL-MCNC: 46 MG/DL — HIGH (ref 7–23)
CALCIUM SERPL-MCNC: 7 MG/DL — LOW (ref 8.4–10.5)
CHLORIDE SERPL-SCNC: 108 MMOL/L — SIGNIFICANT CHANGE UP (ref 96–108)
CO2 SERPL-SCNC: 21 MMOL/L — LOW (ref 22–31)
CREAT SERPL-MCNC: 2.7 MG/DL — HIGH (ref 0.5–1.3)
CULTURE RESULTS: SIGNIFICANT CHANGE UP
EGFR: 18 ML/MIN/1.73M2 — LOW
EOSINOPHIL # BLD AUTO: 0.16 K/UL — SIGNIFICANT CHANGE UP (ref 0–0.5)
EOSINOPHIL NFR BLD AUTO: 1 % — SIGNIFICANT CHANGE UP (ref 0–6)
GLUCOSE BLDC GLUCOMTR-MCNC: 100 MG/DL — HIGH (ref 70–99)
GLUCOSE BLDC GLUCOMTR-MCNC: 161 MG/DL — HIGH (ref 70–99)
GLUCOSE BLDC GLUCOMTR-MCNC: 178 MG/DL — HIGH (ref 70–99)
GLUCOSE BLDC GLUCOMTR-MCNC: 205 MG/DL — HIGH (ref 70–99)
GLUCOSE SERPL-MCNC: 105 MG/DL — HIGH (ref 70–99)
HCT VFR BLD CALC: 23.9 % — LOW (ref 34.5–45)
HGB BLD-MCNC: 7.6 G/DL — LOW (ref 11.5–15.5)
IMM GRANULOCYTES NFR BLD AUTO: 0.9 % — SIGNIFICANT CHANGE UP (ref 0–1.5)
LYMPHOCYTES # BLD AUTO: 18.9 % — SIGNIFICANT CHANGE UP (ref 13–44)
LYMPHOCYTES # BLD AUTO: 3.01 K/UL — SIGNIFICANT CHANGE UP (ref 1–3.3)
MAGNESIUM SERPL-MCNC: 1.2 MG/DL — LOW (ref 1.6–2.6)
MCHC RBC-ENTMCNC: 26.7 PG — LOW (ref 27–34)
MCHC RBC-ENTMCNC: 31.8 GM/DL — LOW (ref 32–36)
MCV RBC AUTO: 83.9 FL — SIGNIFICANT CHANGE UP (ref 80–100)
METHOD TYPE: SIGNIFICANT CHANGE UP
MONOCYTES # BLD AUTO: 1.06 K/UL — HIGH (ref 0–0.9)
MONOCYTES NFR BLD AUTO: 6.7 % — SIGNIFICANT CHANGE UP (ref 2–14)
NEUTROPHILS # BLD AUTO: 11.5 K/UL — HIGH (ref 1.8–7.4)
NEUTROPHILS NFR BLD AUTO: 72.2 % — SIGNIFICANT CHANGE UP (ref 43–77)
NRBC # BLD: 0 /100 WBCS — SIGNIFICANT CHANGE UP (ref 0–0)
ORGANISM # SPEC MICROSCOPIC CNT: SIGNIFICANT CHANGE UP
PLATELET # BLD AUTO: 217 K/UL — SIGNIFICANT CHANGE UP (ref 150–400)
POTASSIUM SERPL-MCNC: 3.4 MMOL/L — LOW (ref 3.5–5.3)
POTASSIUM SERPL-SCNC: 3.4 MMOL/L — LOW (ref 3.5–5.3)
RBC # BLD: 2.85 M/UL — LOW (ref 3.8–5.2)
RBC # FLD: 18.2 % — HIGH (ref 10.3–14.5)
SARS-COV-2 RNA SPEC QL NAA+PROBE: SIGNIFICANT CHANGE UP
SODIUM SERPL-SCNC: 143 MMOL/L — SIGNIFICANT CHANGE UP (ref 135–145)
SPECIMEN SOURCE: SIGNIFICANT CHANGE UP
WBC # BLD: 15.92 K/UL — HIGH (ref 3.8–10.5)
WBC # FLD AUTO: 15.92 K/UL — HIGH (ref 3.8–10.5)

## 2022-06-30 PROCEDURE — 99232 SBSQ HOSP IP/OBS MODERATE 35: CPT

## 2022-06-30 RX ORDER — MAGNESIUM SULFATE 500 MG/ML
1 VIAL (ML) INJECTION ONCE
Refills: 0 | Status: COMPLETED | OUTPATIENT
Start: 2022-06-30 | End: 2022-06-30

## 2022-06-30 RX ORDER — POTASSIUM CHLORIDE 20 MEQ
40 PACKET (EA) ORAL ONCE
Refills: 0 | Status: COMPLETED | OUTPATIENT
Start: 2022-06-30 | End: 2022-06-30

## 2022-06-30 RX ADMIN — Medication 650 MILLIGRAM(S): at 08:05

## 2022-06-30 RX ADMIN — CHLORHEXIDINE GLUCONATE 1 APPLICATION(S): 213 SOLUTION TOPICAL at 06:48

## 2022-06-30 RX ADMIN — Medication 2: at 12:29

## 2022-06-30 RX ADMIN — PANTOPRAZOLE SODIUM 40 MILLIGRAM(S): 20 TABLET, DELAYED RELEASE ORAL at 12:26

## 2022-06-30 RX ADMIN — ATORVASTATIN CALCIUM 80 MILLIGRAM(S): 80 TABLET, FILM COATED ORAL at 21:23

## 2022-06-30 RX ADMIN — SERTRALINE 50 MILLIGRAM(S): 25 TABLET, FILM COATED ORAL at 12:26

## 2022-06-30 RX ADMIN — APIXABAN 5 MILLIGRAM(S): 2.5 TABLET, FILM COATED ORAL at 17:35

## 2022-06-30 RX ADMIN — Medication 100 GRAM(S): at 17:35

## 2022-06-30 RX ADMIN — INSULIN GLARGINE 6 UNIT(S): 100 INJECTION, SOLUTION SUBCUTANEOUS at 21:23

## 2022-06-30 RX ADMIN — SENNA PLUS 2 TABLET(S): 8.6 TABLET ORAL at 21:23

## 2022-06-30 RX ADMIN — MIRTAZAPINE 15 MILLIGRAM(S): 45 TABLET, ORALLY DISINTEGRATING ORAL at 21:23

## 2022-06-30 RX ADMIN — APIXABAN 5 MILLIGRAM(S): 2.5 TABLET, FILM COATED ORAL at 05:20

## 2022-06-30 RX ADMIN — Medication 40 MILLIEQUIVALENT(S): at 12:26

## 2022-06-30 RX ADMIN — Medication 1: at 17:34

## 2022-06-30 RX ADMIN — Medication 5 MILLIGRAM(S): at 21:23

## 2022-06-30 RX ADMIN — CEFEPIME 100 MILLIGRAM(S): 1 INJECTION, POWDER, FOR SOLUTION INTRAMUSCULAR; INTRAVENOUS at 12:26

## 2022-06-30 NOTE — PROGRESS NOTE ADULT - SUBJECTIVE AND OBJECTIVE BOX
Subjective: no complaints.       MEDICATIONS  (STANDING):  apixaban 5 milliGRAM(s) Oral two times a day  atorvastatin 80 milliGRAM(s) Oral at bedtime  bisacodyl 5 milliGRAM(s) Oral at bedtime  cefepime   IVPB 500 milliGRAM(s) IV Intermittent daily  chlorhexidine 2% Cloths 1 Application(s) Topical <User Schedule>  dextrose 5%. 1000 milliLiter(s) (50 mL/Hr) IV Continuous <Continuous>  dextrose 5%. 1000 milliLiter(s) (100 mL/Hr) IV Continuous <Continuous>  dextrose 50% Injectable 12.5 Gram(s) IV Push once  dextrose 50% Injectable 25 Gram(s) IV Push once  insulin glargine Injectable (LANTUS) 6 Unit(s) SubCutaneous at bedtime  insulin lispro (ADMELOG) corrective regimen sliding scale   SubCutaneous at bedtime  insulin lispro (ADMELOG) corrective regimen sliding scale   SubCutaneous three times a day before meals  mirtazapine 15 milliGRAM(s) Oral at bedtime  pantoprazole    Tablet 40 milliGRAM(s) Oral daily  potassium chloride    Tablet ER 40 milliEquivalent(s) Oral once  senna 2 Tablet(s) Oral at bedtime  sertraline 50 milliGRAM(s) Oral daily  sodium chloride 0.45% with potassium chloride 20 mEq/L 1000 milliLiter(s) (75 mL/Hr) IV Continuous <Continuous>    MEDICATIONS  (PRN):  acetaminophen     Tablet .. 650 milliGRAM(s) Oral every 6 hours PRN Temp greater or equal to 38C (100.4F), Mild Pain (1 - 3)  traMADol 25 milliGRAM(s) Oral every 12 hours PRN Moderate Pain (4 - 6)          T(C): 36.4 (06-30-22 @ 05:15), Max: 36.7 (06-29-22 @ 21:00)  HR: 98 (06-30-22 @ 05:15) (89 - 100)  BP: 151/73 (06-30-22 @ 05:15) (132/79 - 169/76)  RR: 16 (06-30-22 @ 05:15) (16 - 20)  SpO2: 100% (06-30-22 @ 05:15) (95% - 100%)  Wt(kg): --        I&O's Detail    29 Jun 2022 07:01  -  30 Jun 2022 07:00  --------------------------------------------------------  IN:  Total IN: 0 mL    OUT:    Indwelling Catheter - Urethral (mL): 1700 mL  Total OUT: 1700 mL    Total NET: -1700 mL               PHYSICAL EXAM:    GENERAL: NAD  NECK: Supple, no inc in JVP  CHEST/LUNG: Clear  HEART: S1S2  ABDOMEN: Soft  EXTREMITIES:  no edema      LABS:  CBC Full  -  ( 30 Jun 2022 07:30 )  WBC Count : 15.92 K/uL  RBC Count : 2.85 M/uL  Hemoglobin : 7.6 g/dL  Hematocrit : 23.9 %  Platelet Count - Automated : 217 K/uL  Mean Cell Volume : 83.9 fl  Mean Cell Hemoglobin : 26.7 pg  Mean Cell Hemoglobin Concentration : 31.8 gm/dL  Auto Neutrophil # : 11.50 K/uL  Auto Lymphocyte # : 3.01 K/uL  Auto Monocyte # : 1.06 K/uL  Auto Eosinophil # : 0.16 K/uL  Auto Basophil # : 0.04 K/uL  Auto Neutrophil % : 72.2 %  Auto Lymphocyte % : 18.9 %  Auto Monocyte % : 6.7 %  Auto Eosinophil % : 1.0 %  Auto Basophil % : 0.3 %    06-30    143  |  108  |  46<H>  ----------------------------<  105<H>  3.4<L>   |  21<L>  |  2.70<H>    Ca    7.0<L>      30 Jun 2022 07:30  Mg     1.2     06-30        ASSESSMENT:  1.  MAXIMILIAN due to septic ATN, recovery phase  2.  Sepsis with presumed  source, resolved  3.  Metabolic acidosis, non lactic, resolved  4.  Hypokalemia, hypernatremia, worsening    PLAN:  Cont IVF 0.45% saline with 20meq of KCL at 75cc/h  KCL 40meq PO ordered for today  BMP, Mg tomorrow.

## 2022-06-30 NOTE — DISCHARGE NOTE PROVIDER - CARE PROVIDER_API CALL
FRANCISCO J POOL  Internal Medicine  [address] [facility]-[address] [place] [address]  Phone: [phone]  Fax: [phone]  Follow Up Time:

## 2022-06-30 NOTE — DISCHARGE NOTE PROVIDER - NSDCCPCAREPLAN_GEN_ALL_CORE_FT
PRINCIPAL DISCHARGE DIAGNOSIS  Diagnosis: Acute UTI  Assessment and Plan of Treatment: -complete 4 more days of oral antibx:  levaquin  -continue rodriguez catheter, Urology f/u as an outpatient for void trial      SECONDARY DISCHARGE DIAGNOSES  Diagnosis: Acute kidney injury superimposed on CKD  Assessment and Plan of Treatment: -avoid nephrotoxins:  no NSAIDS  -follow renal function, bmp and Mag level in 2 days    Diagnosis: Hypokalemia  Assessment and Plan of Treatment: -check potassium level in 1-2 days, replete as necessary     PRINCIPAL DISCHARGE DIAGNOSIS  Diagnosis: Acute UTI  Assessment and Plan of Treatment: -complete 3 more days of oral antibiotics Levaquin starting tomorrow 7/2 (received Cefepime on 7/1)  -continue rodriguez catheter, Urology follow up as an outpatient for void trial      SECONDARY DISCHARGE DIAGNOSES  Diagnosis: Acute kidney injury superimposed on CKD  Assessment and Plan of Treatment: -avoid nephrotoxins: no NSAIDS  -check labwork: renal function, BMP and Magnesium level in 2 days    Diagnosis: Hypokalemia  Assessment and Plan of Treatment: -check potassium level in 2 days, replete as necessary     PRINCIPAL DISCHARGE DIAGNOSIS  Diagnosis: Acute UTI  Assessment and Plan of Treatment: -Complete 3 more days of oral antibiotics Levaquin starting tomorrow 7/2 (received Cefepime on 7/1)  -Continue rodriguez catheter, Urology follow up as an outpatient for void trial      SECONDARY DISCHARGE DIAGNOSES  Diagnosis: Acute kidney injury superimposed on CKD  Assessment and Plan of Treatment: -Avoid nephrotoxins: no NSAIDS  -Check labwork: renal function, BMP and Magnesium level in 2 days    Diagnosis: Hypokalemia  Assessment and Plan of Treatment: Check potassium level in 2 days, replete as necessary.    Diagnosis: Renal lesion  Assessment and Plan of Treatment: For 1.7 cm left renal heterogenous lesion, MRI abdomen with and without IV contrast is recommended as outpatient to rule out solid malignant neoplasm.

## 2022-06-30 NOTE — DISCHARGE NOTE PROVIDER - HOSPITAL COURSE
Hospital Course  72y Female with PMH of XXXX presents with    Source of Infection:UTI Bacteremia   Antibiotic / Last Day:    Palliative Care / Advanced Care Planning  Code Status:  Patient/Family agreeable to Hospice/Palliative (Y/)?  Summary of Goals of Care Conversation:    Discharging Provider:  Ines Tavares  Contact Info: Cell [phone] - Please call with any questions or concerns.    Outpatient Provider:    Signout given to  SNF Provider:  DR POOL        Hospital Course  72y Female with PMH of XXXX presents with    Source of Infection: UTI/ Bacteremia   Antibiotic / Last Day:Completed Cefepime 8 days in hospital and will complete Levaquin 250mg po daily for 4 more Days to be completed JUly 4 after last dose     Palliative Care / Advanced Care Planning  Code Status: DNR   Patient/Family agreeable to Hospice/Palliative (Y/)?  Summary of Goals of Care Conversation:    Discharging Provider:  Ines Tavares  Contact Info: Cell [phone] - Please call with any questions or concerns.    Outpatient Provider:    Signout given to  SNF Provider:  DR POOL        Hospital Course  73 yo female with PMHx of CVA, TIA, CKD3, urinary retention transferred from ICU; she was admitted with septic shock.  Pt now on medical floor.  Pt. found to have UTI and Urinary retention with Pseudomonas bacteremia -- she completed 7 day course of IV Cefepime, now on oral antibx.  Urology evaluated; cont rodriguez. pt may benefit from chronic rodriguez for urinary retention due to CVA,  She was also seen by Renal.   She has MAXIMILIAN/CKD3 with ATN. baseline Cr around 2.5 with a possible Left renal lesion found on CT.  Pt can f/u with an MRI of abd as an outpatient.  Pt with persistent hypokalemia; improved s/p repletion.  Also with DVT on eliquis, she was seen by Hematology. Other heme issues include anemia of chronic dz, no transfusions on this admission.    Goals of Care:  DNR/I , MOLST completed by palliative    Dispo: Sister, Valentine Vidal;  updated on plan of care    Source of Infection: UTI/ Bacteremia   Antibiotic / Last Day:Completed Cefepime 8 days in hospital and will complete Levaquin 250mg po daily for 4 more Days to be completed JUly 4 after last dose     Palliative Care / Advanced Care Planning  Code Status: DNR   Patient/Family agreeable to Hospice/Palliative (Y/)?  Summary of Goals of Care Conversation:    Discharging Provider:  Ines Tavares  Contact Info: Cell [phone] - Please call with any questions or concerns.      Signout given to  SNF Provider:  DR POOL        Hospital Course  73 yo female with PMHx of CVA, TIA, CKD3, urinary retention transferred from ICU; she was admitted with septic shock.  Pt now on medical floor.  Pt. found to have UTI and Urinary retention with Pseudomonas bacteremia -- she completed 7 day course of IV Cefepime, now on oral antibx.  Urology evaluated; cont rodriguez. pt may benefit from chronic rodriguez for urinary retention due to CVA,  She was also seen by Renal.   She has MAXIMILIAN/CKD3 with ATN. baseline Cr around 2.5 with a possible Left renal lesion found on CT.  Pt can f/u with an MRI of abd as an outpatient.  Pt with persistent hypokalemia; improved s/p repletion.  Also with DVT on eliquis, she was seen by Hematology. Other heme issues include anemia of chronic dz, no transfusions on this admission.    Goals of Care:  DNR/I , MOLST completed by palliative    Dispo: Sister, Valentine Vidal;  updated on plan of care    Source of Infection: UTI/ Bacteremia   Antibiotic / Last Day: Completed Cefepime 8 days in hospital and will complete Levaquin 250mg po daily for 4 more Days to be completed July 4 after last dose     Palliative Care / Advanced Care Planning  Code Status: DNR   Patient/Family agreeable to Hospice/Palliative (Y/)?  Summary of Goals of Care Conversation:    Discharging Provider:  Ines Tavares  Contact Info: Cell [phone] - Please call with any questions or concerns.    Signout given to  SNF Provider: Dr Hodges     Hospital Course  73 yo female with PMHx of CVA, TIA, CKD3, urinary retention transferred from ICU; she was admitted with septic shock.  Pt now on medical floor.  Pt. found to have UTI and Urinary retention with Pseudomonas bacteremia -- she completed 7 day course of IV Cefepime, now on oral antibx.  Urology evaluated; cont rodriguez. pt may benefit from chronic rodriguez for urinary retention due to CVA,  She was also seen by Renal.   She has MAXIMILIAN/CKD3 with ATN. baseline Cr around 2.5 with a possible Left renal lesion found on CT.  Pt can f/u with an MRI of abd as an outpatient.  Pt with persistent hypokalemia; improved s/p repletion.  Also with DVT on eliquis, she was seen by Hematology. Other heme issues include anemia of chronic dz, no transfusions on this admission.    Goals of Care:  DNR/I , MOLST completed by palliative    Dispo: Sister, Valentine Vidal;  updated on plan of care    Source of Infection: UTI/ Bacteremia   Antibiotic / Last Day: Completed Cefepime 8 days in hospital and will complete Levaquin 250mg po daily for 4 more Days to be completed July 4 after last dose     Palliative Care / Advanced Care Planning  Code Status: DNR   Patient/Family agreeable to Hospice/Palliative (Y/)?  Summary of Goals of Care Conversation:    Discharging Provider:  Ines Tavares  Contact Info: Cell [phone] - Please call with any questions or concerns.    Signout given to  SNF Provider: Dr Hodges - notified

## 2022-06-30 NOTE — CHART NOTE - NSCHARTNOTEFT_GEN_A_CORE
Nutrition Follow Up Note  Hospital Course (Per Electronic Medical Record):   Source: Medical Record [X] Patient [X]  Nursing Staff [X]     Diet: DASH/TLC,  Consistent Carbohydrate, Minced Moist , Nepro TID     Current Weight: (6/30) 139.9/63.5kg                          (6/30) 146.1/66.3kg                          (6/29) 140.2/63.6kg                          (6/28) 142.8/64.8kg     Pertinent Medications: MEDICATIONS  (STANDING):  apixaban 5 milliGRAM(s) Oral two times a day  atorvastatin 80 milliGRAM(s) Oral at bedtime  bisacodyl 5 milliGRAM(s) Oral at bedtime  cefepime   IVPB 500 milliGRAM(s) IV Intermittent daily  chlorhexidine 2% Cloths 1 Application(s) Topical <User Schedule>  dextrose 5%. 1000 milliLiter(s) (50 mL/Hr) IV Continuous <Continuous>  dextrose 5%. 1000 milliLiter(s) (100 mL/Hr) IV Continuous <Continuous>  dextrose 50% Injectable 12.5 Gram(s) IV Push once  dextrose 50% Injectable 25 Gram(s) IV Push once  insulin glargine Injectable (LANTUS) 6 Unit(s) SubCutaneous at bedtime  insulin lispro (ADMELOG) corrective regimen sliding scale   SubCutaneous at bedtime  insulin lispro (ADMELOG) corrective regimen sliding scale   SubCutaneous three times a day before meals  mirtazapine 15 milliGRAM(s) Oral at bedtime  pantoprazole    Tablet 40 milliGRAM(s) Oral daily  senna 2 Tablet(s) Oral at bedtime  sertraline 50 milliGRAM(s) Oral daily  sodium chloride 0.45% with potassium chloride 20 mEq/L 1000 milliLiter(s) (75 mL/Hr) IV Continuous <Continuous>    MEDICATIONS  (PRN):  acetaminophen     Tablet .. 650 milliGRAM(s) Oral every 6 hours PRN Temp greater or equal to 38C (100.4F), Mild Pain (1 - 3)  traMADol 25 milliGRAM(s) Oral every 12 hours PRN Moderate Pain (4 - 6)      Pertinent Labs:  06-29 Na140 mmol/L Glu 184 mg/dL<H> K+ 3.0 mmol/L<L> Cr  3.00 mg/dL<H> BUN 48 mg/dL<H> 06-27 Phos 2.7 mg/dL 06-27 Alb 1.6 g/dL<L> 06-12 Chol 145 mg/dL LDL --    HDL 43 mg/dL<L> Trig 83 mg/dL        Skin: (L) buttock DTI    Edema: none    Last BM: (6/29)     Estimated Needs:   [X] No Change since Previous Assessment      Previous Nutrition Diagnosis: Severe Protein Calorie Malnutrition     Nutrition Diagnosis is [X] Ongoing         New Nutrition Diagnosis: [X] Not Applicable  [X] Chewing Difficulties    [X] Swallowing Difficulties    [X] Predicted Suboptimal Energy Intake  [X] Obesity   [X] Inadequate Oral Intake   [X] Increased Nutrient Needs   [X] Excessive Energy Intake   [X] Altered GI Function   [X] Unintended Weight Loss   [X] Food & Nutrition Related Knowledge Deficit  [X] Limited Adherence to nutrition related recommendations   [X] Mild Moderate Severe Malnutrition      Interventions:   1. Recommend  2.     Monitoring & Evaluation: will monitor:  [X] Weights   [X] PO Intake   [X] Follow Up (Per Protocol)  [X] Tolerance to Diet Prescription       RD to follow as per Nutrition protocol  Sakina Horn RDN Nutrition Follow Up Note  Hospital Course (Per Electronic Medical Record):   Source: Medical Record [X] Patient [X]  Nursing Staff [X]     Diet: DASH/TLC,  Consistent Carbohydrate, Minced Moist , Nepro TID     Patient tolerating diet , po intake appears to vary patient consuming between 25-50% of meals , (+) dx of severe protein calorie malnutrition , patient being supplemented with Nepro TID , MOLST reviewed , EN feeds not indicated . Encouraged patient to increase po intake & consume supplements, Labs reviewed , K+ being repleted , anemia of chronic disease noted . CR improving .     Current Weight: (6/30) 139.9/63.5kg                          (6/30) 146.1/66.3kg                          (6/29) 140.2/63.6kg                          (6/28) 142.8/64.8kg     Pertinent Medications: MEDICATIONS  (STANDING):  apixaban 5 milliGRAM(s) Oral two times a day  atorvastatin 80 milliGRAM(s) Oral at bedtime  bisacodyl 5 milliGRAM(s) Oral at bedtime  cefepime   IVPB 500 milliGRAM(s) IV Intermittent daily  chlorhexidine 2% Cloths 1 Application(s) Topical <User Schedule>  dextrose 5%. 1000 milliLiter(s) (50 mL/Hr) IV Continuous <Continuous>  dextrose 5%. 1000 milliLiter(s) (100 mL/Hr) IV Continuous <Continuous>  dextrose 50% Injectable 12.5 Gram(s) IV Push once  dextrose 50% Injectable 25 Gram(s) IV Push once  insulin glargine Injectable (LANTUS) 6 Unit(s) SubCutaneous at bedtime  insulin lispro (ADMELOG) corrective regimen sliding scale   SubCutaneous at bedtime  insulin lispro (ADMELOG) corrective regimen sliding scale   SubCutaneous three times a day before meals  mirtazapine 15 milliGRAM(s) Oral at bedtime  pantoprazole    Tablet 40 milliGRAM(s) Oral daily  senna 2 Tablet(s) Oral at bedtime  sertraline 50 milliGRAM(s) Oral daily  sodium chloride 0.45% with potassium chloride 20 mEq/L 1000 milliLiter(s) (75 mL/Hr) IV Continuous <Continuous>    MEDICATIONS  (PRN):  acetaminophen     Tablet .. 650 milliGRAM(s) Oral every 6 hours PRN Temp greater or equal to 38C (100.4F), Mild Pain (1 - 3)  traMADol 25 milliGRAM(s) Oral every 12 hours PRN Moderate Pain (4 - 6)      Pertinent Labs:  06-29 Na140 mmol/L Glu 184 mg/dL<H> K+ 3.0 mmol/L<L> Cr  3.00 mg/dL<H> BUN 48 mg/dL<H> 06-27 Phos 2.7 mg/dL 06-27 Alb 1.6 g/dL<L> 06-12 Chol 145 mg/dL LDL --    HDL 43 mg/dL<L> Trig 83 mg/dL        Skin: (L) buttock DTI    Edema: none    Last BM: (6/29)     Estimated Needs:   [X] No Change since Previous Assessment      Previous Nutrition Diagnosis: Severe Protein Calorie Malnutrition     Nutrition Diagnosis is [X] Ongoing         New Nutrition Diagnosis: [X] Not Applicable      Interventions:   1. continue current nutrition regimen     Monitoring & Evaluation: will monitor:  [X] Weights   [X] PO Intake   [X] Follow Up (Per Protocol)  [X] Tolerance to Diet Prescription       RD to follow as per Nutrition protocol  Sakina Horn RDN Nutrition Follow Up Note  Hospital Course (Per Electronic Medical Record):   Source: Medical Record [X] Patient [X]  Nursing Staff [X]     Diet: DASH/TLC,  Consistent Carbohydrate, Minced Moist , Nepro TID     Patient tolerating diet , po intake appears to vary patient consuming between 25-50% of meals , (+) dx of severe protein calorie malnutrition , patient being supplemented with Nepro TID , MOLST reviewed , EN feeds not indicated . Encouraged patient to increase po intake & consume supplements, Labs reviewed , K+ being repleted , anemia of chronic disease noted . CR improving .     Current Weight: (6/30) 139.9/63.5kg                          (6/30) 146.1/66.3kg                          (6/29) 140.2/63.6kg                          (6/28) 142.8/64.8kg     Pertinent Medications: MEDICATIONS  (STANDING):  apixaban 5 milliGRAM(s) Oral two times a day  atorvastatin 80 milliGRAM(s) Oral at bedtime  bisacodyl 5 milliGRAM(s) Oral at bedtime  cefepime   IVPB 500 milliGRAM(s) IV Intermittent daily  chlorhexidine 2% Cloths 1 Application(s) Topical <User Schedule>  dextrose 5%. 1000 milliLiter(s) (50 mL/Hr) IV Continuous <Continuous>  dextrose 5%. 1000 milliLiter(s) (100 mL/Hr) IV Continuous <Continuous>  dextrose 50% Injectable 12.5 Gram(s) IV Push once  dextrose 50% Injectable 25 Gram(s) IV Push once  insulin glargine Injectable (LANTUS) 6 Unit(s) SubCutaneous at bedtime  insulin lispro (ADMELOG) corrective regimen sliding scale   SubCutaneous at bedtime  insulin lispro (ADMELOG) corrective regimen sliding scale   SubCutaneous three times a day before meals  mirtazapine 15 milliGRAM(s) Oral at bedtime  pantoprazole    Tablet 40 milliGRAM(s) Oral daily  senna 2 Tablet(s) Oral at bedtime  sertraline 50 milliGRAM(s) Oral daily  sodium chloride 0.45% with potassium chloride 20 mEq/L 1000 milliLiter(s) (75 mL/Hr) IV Continuous <Continuous>    MEDICATIONS  (PRN):  acetaminophen     Tablet .. 650 milliGRAM(s) Oral every 6 hours PRN Temp greater or equal to 38C (100.4F), Mild Pain (1 - 3)  traMADol 25 milliGRAM(s) Oral every 12 hours PRN Moderate Pain (4 - 6)      Pertinent Labs:  06-29 Na140 mmol/L Glu 184 mg/dL<H> K+ 3.0 mmol/L<L> Cr  3.00 mg/dL<H> BUN 48 mg/dL<H> 06-27 Phos 2.7 mg/dL 06-27 Alb 1.6 g/dL<L> 06-12 Chol 145 mg/dL LDL --    HDL 43 mg/dL<L> Trig 83 mg/dL  POCT 100,157,120,164      Skin: (L) buttock DTI    Edema: none    Last BM: (6/29)     Estimated Needs:   [X] No Change since Previous Assessment      Previous Nutrition Diagnosis: Severe Protein Calorie Malnutrition     Nutrition Diagnosis is [X] Ongoing         New Nutrition Diagnosis: [X] Not Applicable      Interventions:   1. continue current nutrition regimen     Monitoring & Evaluation: will monitor:  [X] Weights   [X] PO Intake   [X] Follow Up (Per Protocol)  [X] Tolerance to Diet Prescription       RD to follow as per Nutrition protocol  Sakina Horn RDN

## 2022-06-30 NOTE — PHARMACOTHERAPY INTERVENTION NOTE - COMMENTS
Updated med rec based on records from SNF.
Met with patient and reviewed current medications. All questions/concerns addressed.
Pt with new DVT diagnosed 6/12/22. Recommended increasing apixaban from 2.5mg to 5mg BID.

## 2022-06-30 NOTE — DISCHARGE NOTE PROVIDER - NSDCQMCOGNITION_NEU_ALL_CORE
Difficulty concentrating/Difficulty making decisions/Difficulty remembering Difficulty making decisions/Difficulty remembering

## 2022-06-30 NOTE — PROGRESS NOTE ADULT - SUBJECTIVE AND OBJECTIVE BOX
Patient is a 72y old  Female who presents with a chief complaint of Sepsis (29 Jun 2022 16:53)      Patient seen and examined at bedside.    ALLERGIES:  No Known Allergies    MEDICATIONS  (STANDING):  apixaban 5 milliGRAM(s) Oral two times a day  atorvastatin 80 milliGRAM(s) Oral at bedtime  bisacodyl 5 milliGRAM(s) Oral at bedtime  cefepime   IVPB 500 milliGRAM(s) IV Intermittent daily  chlorhexidine 2% Cloths 1 Application(s) Topical <User Schedule>  dextrose 5%. 1000 milliLiter(s) (100 mL/Hr) IV Continuous <Continuous>  dextrose 5%. 1000 milliLiter(s) (50 mL/Hr) IV Continuous <Continuous>  dextrose 50% Injectable 12.5 Gram(s) IV Push once  dextrose 50% Injectable 25 Gram(s) IV Push once  insulin glargine Injectable (LANTUS) 6 Unit(s) SubCutaneous at bedtime  insulin lispro (ADMELOG) corrective regimen sliding scale   SubCutaneous at bedtime  insulin lispro (ADMELOG) corrective regimen sliding scale   SubCutaneous three times a day before meals  mirtazapine 15 milliGRAM(s) Oral at bedtime  pantoprazole    Tablet 40 milliGRAM(s) Oral daily  senna 2 Tablet(s) Oral at bedtime  sertraline 50 milliGRAM(s) Oral daily  sodium chloride 0.45% with potassium chloride 20 mEq/L 1000 milliLiter(s) (75 mL/Hr) IV Continuous <Continuous>    MEDICATIONS  (PRN):  acetaminophen     Tablet .. 650 milliGRAM(s) Oral every 6 hours PRN Temp greater or equal to 38C (100.4F), Mild Pain (1 - 3)  traMADol 25 milliGRAM(s) Oral every 12 hours PRN Moderate Pain (4 - 6)    Vital Signs Last 24 Hrs  T(F): 97.6 (30 Jun 2022 05:15), Max: 98.1 (29 Jun 2022 21:00)  HR: 98 (30 Jun 2022 05:15) (89 - 100)  BP: 151/73 (30 Jun 2022 05:15) (132/79 - 169/76)  RR: 16 (30 Jun 2022 05:15) (16 - 20)  SpO2: 100% (30 Jun 2022 05:15) (95% - 100%)  I&O's Summary    29 Jun 2022 07:01  -  30 Jun 2022 07:00  --------------------------------------------------------  IN: 0 mL / OUT: 1700 mL / NET: -1700 mL      PHYSICAL EXAM:  General: NAD, A/O x 3  ENT: MMM  Neck: Supple, No JVD  Lungs: Clear to auscultation bilaterally, Non labored breathing   Cardio: RRR, S1/S2, No murmurs  Abdomen: Soft, Nontender, Nondistended; Bowel sounds present  Extremities: No calf tenderness, No pitting edema    LABS:                        8.3    17.05 )-----------( 213      ( 29 Jun 2022 06:00 )             25.0     06-29    140  |  106  |  48  ----------------------------<  184  3.0   |  24  |  3.00    Ca    7.1      29 Jun 2022 06:00        PT/INR - ( 28 Jun 2022 06:15 )   PT: 23.0 sec;   INR: 1.97 ratio                 06-12 Chol 145 mg/dL LDL -- HDL 43 mg/dL Trig 83 mg/dL              POCT Blood Glucose.: 157 mg/dL (29 Jun 2022 21:12)  POCT Blood Glucose.: 120 mg/dL (29 Jun 2022 16:43)  POCT Blood Glucose.: 164 mg/dL (29 Jun 2022 12:05)  POCT Blood Glucose.: 172 mg/dL (29 Jun 2022 08:41)          Culture - Blood (collected 29 Jun 2022 00:40)  Source: .Blood Blood  Preliminary Report (30 Jun 2022 04:02):    No growth to date.    Culture - Blood (collected 29 Jun 2022 00:40)  Source: .Blood Blood  Preliminary Report (30 Jun 2022 04:02):    No growth to date.    Culture - Urine (collected 23 Jun 2022 15:33)  Source: Clean Catch Clean Catch (Midstream)  Final Report (24 Jun 2022 22:42):    >=3 organisms. Probable collection contamination.    Culture - Blood (collected 23 Jun 2022 12:21)  Source: .Blood Blood-Peripheral  Gram Stain (28 Jun 2022 14:39):    Growth in aerobic bottle: Gram Negative Rods  Preliminary Report (29 Jun 2022 12:54):    Growth in aerobic bottle: Pseudomonas aeruginosa    ***Blood Panel PCR results on this specimen are available    approximately 3 hours after the Gram stain result.***    Gram stain, PCR, and/or culture results may not always    correspond due to differencein methodologies.    ************************************************************    This PCR assay was performed by multiplex PCR. This    Assay tests for 66 bacterial and resistance gene targets.    Please refer to the [facility] test directory    at [email or web address] for details.  Organism: Blood Culture PCR (28 Jun 2022 16:44)  Organism: Blood Culture PCR (28 Jun 2022 16:44)      -  Pseudomonas aeruginosa: Detec      Method Type: PCR    Culture - Blood (collected 23 Jun 2022 12:21)  Source: .Blood Blood-Peripheral  Final Report (28 Jun 2022 16:01):    No Growth Final      COVID-19 PCR: NotDetec (06-11-22 @ 18:10)    RADIOLOGY & ADDITIONAL TESTS:    Care Discussed with Consultants/Other Providers:    Patient is a 72y old  Female who presents with a chief complaint of Sepsis (29 Jun 2022 16:53)      Patient seen and examined at bedside. Pt sitting up in bed eating breakfast stating she feels great.  Denies any problems     ALLERGIES:  No Known Allergies    MEDICATIONS  (STANDING):  apixaban 5 milliGRAM(s) Oral two times a day  atorvastatin 80 milliGRAM(s) Oral at bedtime  bisacodyl 5 milliGRAM(s) Oral at bedtime  cefepime   IVPB 500 milliGRAM(s) IV Intermittent daily  chlorhexidine 2% Cloths 1 Application(s) Topical <User Schedule>  dextrose 5%. 1000 milliLiter(s) (100 mL/Hr) IV Continuous <Continuous>  dextrose 5%. 1000 milliLiter(s) (50 mL/Hr) IV Continuous <Continuous>  dextrose 50% Injectable 12.5 Gram(s) IV Push once  dextrose 50% Injectable 25 Gram(s) IV Push once  insulin glargine Injectable (LANTUS) 6 Unit(s) SubCutaneous at bedtime  insulin lispro (ADMELOG) corrective regimen sliding scale   SubCutaneous at bedtime  insulin lispro (ADMELOG) corrective regimen sliding scale   SubCutaneous three times a day before meals  mirtazapine 15 milliGRAM(s) Oral at bedtime  pantoprazole    Tablet 40 milliGRAM(s) Oral daily  senna 2 Tablet(s) Oral at bedtime  sertraline 50 milliGRAM(s) Oral daily  sodium chloride 0.45% with potassium chloride 20 mEq/L 1000 milliLiter(s) (75 mL/Hr) IV Continuous <Continuous>    MEDICATIONS  (PRN):  acetaminophen     Tablet .. 650 milliGRAM(s) Oral every 6 hours PRN Temp greater or equal to 38C (100.4F), Mild Pain (1 - 3)  traMADol 25 milliGRAM(s) Oral every 12 hours PRN Moderate Pain (4 - 6)    Vital Signs Last 24 Hrs  T(F): 97.6 (30 Jun 2022 05:15), Max: 98.1 (29 Jun 2022 21:00)  HR: 98 (30 Jun 2022 05:15) (89 - 100)  BP: 151/73 (30 Jun 2022 05:15) (132/79 - 169/76)  RR: 16 (30 Jun 2022 05:15) (16 - 20)  SpO2: 100% (30 Jun 2022 05:15) (95% - 100%)  I&O's Summary    29 Jun 2022 07:01  -  30 Jun 2022 07:00  --------------------------------------------------------  IN: 0 mL / OUT: 1700 mL / NET: -1700 mL      PHYSICAL EXAM:  General: 73 y/o female in NAD, A/O x 3  ENT: MMM  Neck: Supple, No JVD  Lungs: Clear to auscultation bilaterally, Non labored breathing   Cardio: RRR, S1/S2, No murmurs  Abdomen: Soft, Nontender, Nondistended; Bowel sounds present  Extremities: No calf tenderness, No pitting edema    LABS:                        8.3    17.05 )-----------( 213      ( 29 Jun 2022 06:00 )             25.0     06-29    140  |  106  |  48  ----------------------------<  184  3.0   |  24  |  3.00    Ca    7.1      29 Jun 2022 06:00        PT/INR - ( 28 Jun 2022 06:15 )   PT: 23.0 sec;   INR: 1.97 ratio                 06-12 Chol 145 mg/dL LDL -- HDL 43 mg/dL Trig 83 mg/dL              POCT Blood Glucose.: 157 mg/dL (29 Jun 2022 21:12)  POCT Blood Glucose.: 120 mg/dL (29 Jun 2022 16:43)  POCT Blood Glucose.: 164 mg/dL (29 Jun 2022 12:05)  POCT Blood Glucose.: 172 mg/dL (29 Jun 2022 08:41)          Culture - Blood (collected 29 Jun 2022 00:40)  Source: .Blood Blood  Preliminary Report (30 Jun 2022 04:02):    No growth to date.    Culture - Blood (collected 29 Jun 2022 00:40)  Source: .Blood Blood  Preliminary Report (30 Jun 2022 04:02):    No growth to date.    Culture - Urine (collected 23 Jun 2022 15:33)  Source: Clean Catch Clean Catch (Midstream)  Final Report (24 Jun 2022 22:42):    >=3 organisms. Probable collection contamination.    Culture - Blood (collected 23 Jun 2022 12:21)  Source: .Blood Blood-Peripheral  Gram Stain (28 Jun 2022 14:39):    Growth in aerobic bottle: Gram Negative Rods  Preliminary Report (29 Jun 2022 12:54):    Growth in aerobic bottle: Pseudomonas aeruginosa    ***Blood Panel PCR results on this specimen are available    approximately 3 hours after the Gram stain result.***    Gram stain, PCR, and/or culture results may not always    correspond due to differencein methodologies.    ************************************************************    This PCR assay was performed by multiplex PCR. This    Assay tests for 66 bacterial and resistance gene targets.    Please refer to the [facility] test directory    at [email or web address] for details.  Organism: Blood Culture PCR (28 Jun 2022 16:44)  Organism: Blood Culture PCR (28 Jun 2022 16:44)      -  Pseudomonas aeruginosa: Detec      Method Type: PCR    Culture - Blood (collected 23 Jun 2022 12:21)  Source: .Blood Blood-Peripheral  Final Report (28 Jun 2022 16:01):    No Growth Final      COVID-19 PCR: NotDetec (06-11-22 @ 18:10)    RADIOLOGY & ADDITIONAL TESTS:    Care Discussed with Consultants/Other Providers:

## 2022-06-30 NOTE — PROGRESS NOTE ADULT - ASSESSMENT
71 yo female with PMHx of CVA, TIA, CKD3, urinary retention transferred from ICU, s/p septic shock to medical floor.    Septic shock; resolved  UTI and Urinary retention  Pseudomonas bacteremia   Leukocytosis; improved but remained elevated   -cont antibx, day 8 of Cefepime  -BC on 6/23/22: pseudomonas  -Blood cx- NGTD  -urine culture contaminated  -ID following  -cont rodriguez. per urology, pt may benefit from chronic rodriguez for urinary retention due to CVA  -Renal following; replete K    MAXIMILIAN/CKD3 with ATN. baseline Cr around 2.5   Possible Left renal lesion  -MAXIMILIAN improving; Cr improved to 3.0 today   -Renal on board  -cont IVF  -Abnormal CT: No evidence for a calculus in the kidneys or ureters. No hydronephrosis. Bilateral extrarenal pelvises. 1.7 cm heterogeneous left renal lesion--abdominal MR w/wo IV contrast to be considered to rule out a solid malignant neoplasm.  -MRI abd as outpatient    Hypokalemia   -repleted. monitor BMP    DVT on eliquis  Elevated INR s/p Vit K , ? from sepsis  -INR improved, stop checking per Heme   -continue on Eliquis, cont treatment for new DVT  -Hemeonc following; ok for Eliquis    Anemia of chronic renal dz.  -cont to monitor  -no signs of gross bleeding  -Heme following    Underlying Dementia, Depression  -dementia mild, cont Remeron, Sertraline    hx of CVA with left sided weakness  -cont Apixaban and Statin    DM2 on insulin  HgbA1c 6.1  -cont Lantus 6 units daily  -ISS and accuchecks  POCT Blood Glucose.: 157 mg/dL (29 Jun 2022 21:12)  POCT Blood Glucose.: 120 mg/dL (29 Jun 2022 16:43)  POCT Blood Glucose.: 164 mg/dL (29 Jun 2022 12:05)  POCT Blood Glucose.: 172 mg/dL (29 Jun 2022 08:41)      DVT ppx - on Apixaban  Goals of Care:  DNR/I , MOLST completed by palliative    Dispo:  Sister, Valentine Vidal; discussed possibility of left renal lesion being a malignancy and if she and the patient would like to pursue further testing such as MRI and possibility of a renal biopsy.  She will discuss with the patient.   Anticipate D/C in next 24hrs pending surveillance blood cx results.    73 yo female with PMHx of CVA, TIA, CKD3, urinary retention transferred from ICU, s/p septic shock to medical floor.    Septic shock; resolved  UTI and Urinary retention  Pseudomonas bacteremia   Leukocytosis; improved but remained elevated   -cont antibx, day 8 of Cefepime  -BC on 6/23/22: pseudomonas  -Blood cx- NGTD from 6/29  -urine culture contaminated  -ID following awaiting duration of IV antibiotics and further course of tx  -cont rodriguez. per urology, pt may benefit from chronic rodriguez for urinary retention due to CVA  -Renal following; replete K    MAXIMILIAN/CKD3 with ATN. baseline Cr around 2.5   Possible Left renal lesion  -MAXIMILIAN improving; Cr improved to 2.7 today   -Renal on board  -cont IVF  -Abnormal CT: No evidence for a calculus in the kidneys or ureters. No hydronephrosis. Bilateral extrarenal pelvises. 1.7 cm heterogeneous left renal lesion--abdominal MR w/wo IV contrast to be considered to rule out a solid malignant neoplasm.  -MRI abd as outpatient    Hypokalemia   3.4 today   -repleted. monitor BMP    DVT on eliquis  Elevated INR s/p Vit K , ? from sepsis  -INR improved, stop checking per Heme   -continue on Eliquis, cont treatment for new DVT  -Hemeonc following; ok for Eliquis    Anemia of chronic renal dz.  -cont to monitor  -no signs of gross bleeding  -Heme following    Underlying Dementia, Depression  -dementia mild, cont Remeron, Sertraline    hx of CVA with left sided weakness  -cont Apixaban and Statin    DM2 on insulin  HgbA1c 6.1  -cont Lantus 6 units daily  -ISS and accuchecks  POCT Blood Glucose.: 157 mg/dL (29 Jun 2022 21:12)  POCT Blood Glucose.: 120 mg/dL (29 Jun 2022 16:43)  POCT Blood Glucose.: 164 mg/dL (29 Jun 2022 12:05)  POCT Blood Glucose.: 172 mg/dL (29 Jun 2022 08:41)      DVT ppx - on Apixaban  Goals of Care:  DNR/I , MOLST completed by palliative    Dispo: 6/30 Sister, Valentine Vidal;  updated on plan of care and anticipation of discharge pending ID approval.  Anticipate D/C in next 24hrs pending surveillance blood cx results- so far NGTD   73 yo female with PMHx of CVA, TIA, CKD3, urinary retention transferred from ICU, s/p septic shock to medical floor.    Septic shock; resolved  UTI and Urinary retention  Pseudomonas bacteremia   Leukocytosis; improved but remained elevated   -cont antibx, day 8 of Cefepime  - will change to Levaquin in am 250mg for 4 more days as per ID   -BC on 6/23/22: pseudomonas  -Blood cx- NGTD from 6/29  -urine culture contaminated  -ID following awaiting duration of IV antibiotics and further course of tx  -cont rodriguez. per urology, pt may benefit from chronic rodriguez for urinary retention due to CVA  -Renal following; replete K    MAXIMILIAN/CKD3 with ATN. baseline Cr around 2.5   Possible Left renal lesion  -MAXIMILIAN improving; Cr improved to 2.7 today   -Renal on board  -cont IVF  -Abnormal CT: No evidence for a calculus in the kidneys or ureters. No hydronephrosis. Bilateral extrarenal pelvises. 1.7 cm heterogeneous left renal lesion--abdominal MR w/wo IV contrast to be considered to rule out a solid malignant neoplasm.  -MRI abd as outpatient    Hypokalemia   3.4 today   -repleted. monitor BMP    DVT on eliquis  Elevated INR s/p Vit K , ? from sepsis  -INR improved, stop checking per Heme   -continue on Eliquis, cont treatment for new DVT  -Hemeonc following; ok for Eliquis    Anemia of chronic renal dz.  -cont to monitor  -no signs of gross bleeding  -Heme following    Underlying Dementia, Depression  -dementia mild, cont Remeron, Sertraline    hx of CVA with left sided weakness  -cont Apixaban and Statin    DM2 on insulin  HgbA1c 6.1  -cont Lantus 6 units daily  -ISS and accuchecks  POCT Blood Glucose.: 157 mg/dL (29 Jun 2022 21:12)  POCT Blood Glucose.: 120 mg/dL (29 Jun 2022 16:43)  POCT Blood Glucose.: 164 mg/dL (29 Jun 2022 12:05)  POCT Blood Glucose.: 172 mg/dL (29 Jun 2022 08:41)      DVT ppx - on Apixaban  Goals of Care:  DNR/I , MOLST completed by palliative    Dispo: 6/30 Sister, Valentine Vidal;  updated on plan of care and anticipation of discharge pending ID approval.  Anticipate D/C in next 24hrs pending surveillance blood cx results- so far NGTD   73 yo female with PMHx of CVA, TIA, CKD3, urinary retention transferred from ICU, s/p septic shock to medical floor.    Septic shock; resolved  UTI and Urinary retention  Pseudomonas bacteremia   Leukocytosis; improved but remained elevated   -cont antibx, day 8 of Cefepime  -will change to Levaquin in am 250mg for 4 more days as per ID   -BC on 6/23/22: pseudomonas  -Blood cx- NGTD from 6/29  -urine culture contaminated  -ID following awaiting duration of IV antibiotics and further course of tx  -cont rodriguez. per urology, pt may benefit from chronic rodriguez for urinary retention due to CVA  -Renal following; replete K    MAXIMILIAN/CKD3 with ATN. baseline Cr around 2.5   Possible Left renal lesion  -MAXIMILIAN improving; Cr improved to 2.7 today   -Renal on board  -cont IVF  -Abnormal CT: No evidence for a calculus in the kidneys or ureters. No hydronephrosis. Bilateral extrarenal pelvises. 1.7 cm heterogeneous left renal lesion--abdominal MR w/wo IV contrast to be considered to rule out a solid malignant neoplasm.  -MRI abd as outpatient    Hypokalemia   3.4 today   -repleted. monitor BMP    DVT on eliquis  Elevated INR s/p Vit K , ? from sepsis  -INR improved, stop checking per Heme   -continue on Eliquis, cont treatment for new DVT  -Hemeonc following; ok for Eliquis    Anemia of chronic renal dz.  -cont to monitor  -no signs of gross bleeding  -Heme following    Underlying Dementia, Depression  -dementia mild, cont Remeron, Sertraline    hx of CVA with left sided weakness  -cont Apixaban and Statin    DM2 on insulin  HgbA1c 6.1  -cont Lantus 6 units daily  -ISS and accuchecks  POCT Blood Glucose.: 157 mg/dL (29 Jun 2022 21:12)  POCT Blood Glucose.: 120 mg/dL (29 Jun 2022 16:43)  POCT Blood Glucose.: 164 mg/dL (29 Jun 2022 12:05)  POCT Blood Glucose.: 172 mg/dL (29 Jun 2022 08:41)      DVT ppx - on Apixaban  Goals of Care:  DNR/I , MOLST completed by palliative    Dispo: 6/30 Sister, Valentine Vidal;  updated on plan of care and anticipation of discharge pending ID approval.  Anticipate D/C in next 24hrs pending surveillance blood cx results- so far NGTD

## 2022-06-30 NOTE — DISCHARGE NOTE PROVIDER - NSDCMRMEDTOKEN_GEN_ALL_CORE_FT
acetaminophen 325 mg oral tablet: 2 tab(s) orally every 8 hours, As Needed  Anusol-HC 2.5% rectal cream with applicator: 1 application rectal once a day (at bedtime)  apixaban 5 mg oral tablet: 1 tab(s) orally 2 times a day  Artificial Tears ophthalmic solution: 1 drop(s) to each affected eye 2 times a day  atorvastatin 80 mg oral tablet: 1 tab(s) orally once a day (at bedtime)  cefuroxime 250 mg oral tablet: 1 tab(s) orally every 12 hours until 6/20  DuoNeb 0.5 mg-2.5 mg/3 mL inhalation solution: 3 milliliter(s) inhaled every 6 hours  lactobacillus acidophilus oral tablet: 1 tab(s) orally once a day  Melatonin 3 mg oral tablet: 1 tab(s) orally once a day (at bedtime)  mirtazapine 15 mg oral tablet: 1 tab(s) orally once a day (at bedtime)  Multiple Vitamins oral tablet: 1 tab(s) orally once a day  Semglee 100 units/mL subcutaneous solution: 6 unit(s) subcutaneous once a day (at bedtime)  Senna 8.6 mg oral tablet: 2 tab(s) orally once a day (at bedtime), As Needed  sertraline 50 mg oral tablet: 1 tab(s) orally once a day  traMADol 50 mg oral tablet: 0.5 tab(s) orally every 6 hours, As needed, Moderate Pain (4 - 6)   acetaminophen 325 mg oral tablet: 2 tab(s) orally every 8 hours, As Needed  Anusol-HC 2.5% rectal cream with applicator: 1 application rectal once a day (at bedtime)  apixaban 5 mg oral tablet: 1 tab(s) orally 2 times a day  Artificial Tears ophthalmic solution: 1 drop(s) to each affected eye 2 times a day  atorvastatin 80 mg oral tablet: 1 tab(s) orally once a day (at bedtime)  DuoNeb 0.5 mg-2.5 mg/3 mL inhalation solution: 3 milliliter(s) inhaled every 6 hours  lactobacillus acidophilus oral tablet: 1 tab(s) orally once a day  Melatonin 3 mg oral tablet: 1 tab(s) orally once a day (at bedtime)  mirtazapine 15 mg oral tablet: 1 tab(s) orally once a day (at bedtime)  Multiple Vitamins oral tablet: 1 tab(s) orally once a day  Semglee 100 units/mL subcutaneous solution: 6 unit(s) subcutaneous once a day (at bedtime)  Senna 8.6 mg oral tablet: 2 tab(s) orally once a day (at bedtime), As Needed  sertraline 50 mg oral tablet: 1 tab(s) orally once a day  traMADol 50 mg oral tablet: 0.5 tab(s) orally every 6 hours, As needed, Moderate Pain (4 - 6)   acetaminophen 325 mg oral tablet: 2 tab(s) orally every 8 hours, As Needed  Anusol-HC 2.5% rectal cream with applicator: 1 application rectal once a day (at bedtime)  apixaban 5 mg oral tablet: 1 tab(s) orally 2 times a day  Artificial Tears ophthalmic solution: 1 drop(s) to each affected eye 2 times a day  atorvastatin 80 mg oral tablet: 1 tab(s) orally once a day (at bedtime)  DuoNeb 0.5 mg-2.5 mg/3 mL inhalation solution: 3 milliliter(s) inhaled every 6 hours  lactobacillus acidophilus oral tablet: 1 tab(s) orally once a day  levoFLOXacin 250 mg oral tablet: 1 tab(s) orally every 24 hours   Melatonin 3 mg oral tablet: 1 tab(s) orally once a day (at bedtime)  mirtazapine 15 mg oral tablet: 1 tab(s) orally once a day (at bedtime)  Multiple Vitamins oral tablet: 1 tab(s) orally once a day  Semglee 100 units/mL subcutaneous solution: 6 unit(s) subcutaneous once a day (at bedtime)  Senna 8.6 mg oral tablet: 2 tab(s) orally once a day (at bedtime), As Needed  sertraline 50 mg oral tablet: 1 tab(s) orally once a day  traMADol 50 mg oral tablet: 0.5 tab(s) orally every 6 hours, As needed, Moderate Pain (4 - 6)   acetaminophen 325 mg oral tablet: 2 tab(s) orally every 8 hours, As Needed  Anusol-HC 2.5% rectal cream with applicator: 1 application rectal once a day (at bedtime)  apixaban 5 mg oral tablet: 1 tab(s) orally 2 times a day  Artificial Tears ophthalmic solution: 1 drop(s) to each affected eye 2 times a day  atorvastatin 80 mg oral tablet: 1 tab(s) orally once a day (at bedtime)  DuoNeb 0.5 mg-2.5 mg/3 mL inhalation solution: 3 milliliter(s) inhaled every 6 hours  lactobacillus acidophilus oral tablet: 1 tab(s) orally once a day  levoFLOXacin 250 mg oral tablet: 1 tab(s) orally every 24 hours starting 7/2/22 for 3 days  Melatonin 3 mg oral tablet: 1 tab(s) orally once a day (at bedtime)  mirtazapine 15 mg oral tablet: 1 tab(s) orally once a day (at bedtime)  Multiple Vitamins oral tablet: 1 tab(s) orally once a day  Semglee 100 units/mL subcutaneous solution: 6 unit(s) subcutaneous once a day (at bedtime)  Senna 8.6 mg oral tablet: 2 tab(s) orally once a day (at bedtime), As Needed  sertraline 50 mg oral tablet: 1 tab(s) orally once a day  traMADol 50 mg oral tablet: 0.5 tab(s) orally every 6 hours, As needed, Moderate Pain (4 - 6)

## 2022-06-30 NOTE — DISCHARGE NOTE PROVIDER - DISCHARGE DIET
Consistent Carbohydrate Diabetic Diets/Minced and Moist Diet Consistent Carbohydrate Diabetic Diets/Easy to Chew Diet Soft and Bite-Sized Diet/Consistent Carbohydrate Diabetic Diets/Easy to Chew Diet

## 2022-06-30 NOTE — PROGRESS NOTE ADULT - NS ATTEND AMEND GEN_ALL_CORE FT
Septic Shock - resolved  UTI, Pseudomonas bacteremia - on Cefepime per ID  MAXIMILIAN - improving, outpatient MRI abdomen for renal lesion

## 2022-06-30 NOTE — PROGRESS NOTE ADULT - SUBJECTIVE AND OBJECTIVE BOX
JENNIFER REBOLLEDO, 72y Female  MRN: [id number]  ATTENDING: Joseph Ng    HPI:  72F, nursing home resident, with PMHx CVA/TIA, dementia, depression, dyslipidemia, CDK, DM2, DVT, admitted at [facility] with sepsis; recently on antibiotics for UTI, discharged from [facility] 3 days ago on Eliquis and Ceftin.  While in nursing home patient started to present abdominal discomfort and hypotension.  Found with leukocytosis (WBC> 30K with myeloid shift).  In the emergency room she was screaming in pain.  CT abdomen showed no bowel obstruction, present colonic diverticulosis without diverticulitis, and a 1.7 cm left renal lesion (?  malignancy).  Additionally, patient found coagulopathic (INR>15); hematology consulted on etiology of coagulopathy.    MEDICATIONS:  acetaminophen     Tablet .. 650 milliGRAM(s) Oral every 6 hours PRN  atorvastatin 80 milliGRAM(s) Oral at bedtime  cefepime   IVPB 500 milliGRAM(s) IV Intermittent daily  chlorhexidine 2% Cloths 1 Application(s) Topical <User Schedule>  dextrose 5%. 1000 milliLiter(s) IV Continuous <Continuous>  dextrose 5%. 1000 milliLiter(s) IV Continuous <Continuous>  dextrose 50% Injectable 25 Gram(s) IV Push once  dextrose 50% Injectable 12.5 Gram(s) IV Push once  dextrose 50% Injectable 25 Gram(s) IV Push once  insulin glargine Injectable (LANTUS) 6 Unit(s) SubCutaneous at bedtime  insulin lispro (ADMELOG) corrective regimen sliding scale   SubCutaneous at bedtime  insulin lispro (ADMELOG) corrective regimen sliding scale   SubCutaneous three times a day before meals  lactated ringers. 1000 milliLiter(s) IV Continuous <Continuous>  magnesium sulfate  IVPB 2 Gram(s) IV Intermittent every 2 hours  midodrine 5 milliGRAM(s) Oral every 8 hours  mirtazapine 15 milliGRAM(s) Oral at bedtime  pantoprazole    Tablet 40 milliGRAM(s) Oral daily  phenylephrine    Infusion 0.1 MICROgram(s)/kG/Min IV Continuous <Continuous>  potassium chloride  10 mEq/100 mL IVPB 10 milliEquivalent(s) IV Intermittent every 1 hour  sertraline 50 milliGRAM(s) Oral daily  traMADol 25 milliGRAM(s) Oral every 12 hours PRN    All other medications reviewed.    SUBJECTIVE:  No events overnight; unchanged clinical status.    VITALS:  T(C): 36.4 (06-25-22 @ 05:00), Max: 36.9 (06-24-22 @ 22:00)  T(F): 97.6 (06-25-22 @ 05:00), Max: 98.4 (06-24-22 @ 22:00)  HR: 91 (06-25-22 @ 07:00) (89 - 126)  BP: 100/55 (06-25-22 @ 07:00) (84/48 - 131/58)    PHYSICAL EXAM:  Constitutional: alert, well developed  HEENT: normocephalic, anicteric sclerae, no mucositis or thrush  Respiratory: bilateral clear to auscultation anteriorly  Cardiovascular : S1, S2 regular, rhythmic, no murmurs, gallops or rubs  Abdomen: soft, distended, + normoactive BS, no palpable HS- megaly  Extremities: no tenderness;  -c/c/e, pulses equal bilaterally    LABS:  INR 1.97  Platelet Count - Automated: 418 K/uL (06-25-22 @ 05:30)  Platelet Count - Automated: 284 K/uL (06-24-22 @ 22:40)  Platelet Count - Automated: 258 K/uL (06-24-22 @ 12:15)    Hemoglobin: 7.2 g/dL (06-25-22 @ 05:30)  Hemoglobin: 7.6 g/dL (06-24-22 @ 22:40)  Hemoglobin: 7.3 g/dL (06-24-22 @ 12:15)    WBC Count: 20.84 K/uL (06-25-22 @ 05:30)  WBC Count: 18.89 K/uL (06-24-22 @ 22:40)  WBC Count: 21.54 K/uL (06-24-22 @ 12:15)    (06-25) WBC: 20.84 K/uL,Hemoglobin: 7.2 g/dL, Hematocrit: 21.8 %,  Platelet: 418 K/uL  (06-25) Na: 144 mmol/L ; K: 2.9 mmol/L ; BUN: 83 mg/dL ; Cr: 4.85 mg/dL.    RADIOLOGY:  ACC: [id number] EXAM:  CT ABDOMEN AND PELVIS                          PROCEDURE DATE:  06/23/2022          INTERPRETATION:  CLINICAL INFORMATION: Abdominal pain    COMPARISON: None.    CONTRAST/COMPLICATIONS:  IV Contrast: NONE  Oral Contrast: NONE  Complications: None reported at time of study completion    PROCEDURE:  CT of the Abdomen and Pelvis was performed.  Sagittal and coronal reformats were performed.    FINDINGS: The examination is limited by lack of IV/oral contrast.  LOWER CHEST: Small bilateral pleural effusions. Mild bilateral   atelectasis. Prominence of the central pulmonary arteries, suggestive of   pulmonary arterial hypertension.    LIVER: Within normal limits.  BILE DUCTS: Normal caliber.  GALLBLADDER: Within normal limits.  SPLEEN: Within normal limits.  PANCREAS: Within normal limits.  ADRENALS: Nonspecific mild adrenal thickening bilaterally.  KIDNEYS/URETERS: No evidence for a calculus in the kidneys or ureters. No   hydronephrosis. Bilateral extrarenal pelvises. Apparent 1.7 cm   heterogeneous left renal lesion with Hounsfield unit of 33 (image 53   series 2); if clinically indicated, abdominal MR without and with IV   contrast may be pursued to rule out a solid malignant neoplasm.    BLADDER: Mckeon catheter in the urinary bladder which is collapsed.  REPRODUCTIVE ORGANS: The uterus is not visualized, probably surgically   absent.    BOWEL: No bowel obstruction or grossly thickened bowel wall. Colonic   diverticulosis without evidence of diverticulitis. Appendix within normal   limits. Moderate amount of stool in the rectum.  PERITONEUM: No free air or ascites.  VESSELS: Calcified atherosclerotic disease.  RETROPERITONEUM/LYMPH NODES: No lymphadenopathy.  ABDOMINAL WALL: Small fat-containing umbilical hernia.  BONES: Mild degenerative spondylosis.    IMPRESSION:  No bowel obstruction or grossly thickened bowel wall. Colonic   diverticulosis without evidence of diverticulitis. Appendix within normal   limits. Moderate amount of stool in the rectum.    Small bilateral pleural effusions    Apparent 1.7 cm heterogeneous left renal lesion with Hounsfield unit of   33; if clinically indicated, abdominal MR without and with IV contrast   may be pursued to rule out a solid malignant neoplasm.

## 2022-06-30 NOTE — PROGRESS NOTE ADULT - PROBLEM SELECTOR PLAN 1
Coagulopathy likely connected to ongoing sepsis.  Clinical picture already much improved from yesterday.Continues on antibiotics.  Pending LDH, plasma fibrinogen and D-dimers ( D-dimers on 6/11/2022 at 1611).  Continues anticoagulant.  Case discussed with Dr. Ng.
Coagulopathy resolving; INR 1.78 yesterday.  Improving clinically with treatment for sepsis.  Fibrinogen at 692, D-dimer is elevated at 525.  Resumed anticoagulation; continue monitoring INR
Patient continues antibiotic treatment for sepsis.  Unclear why INR is being checked when patient is on a DOAC.  Recommend discontinue INR monitoring.  Remains on apixaban 5 mg twice daily.
Patient continues anticoagulation with DOAC.  Hematologic profile consistent with anemia and reactive leukocytosis.  Recovering after septic shock/Pseudomonas bacteremia–continue cefepime IV.  Will continue treatment for DVT with Eliquis.
Sepsis with Pseudomonas, on cefepime per ID.  Unclear as to why WBC continues to increase.  Patient remains on anticoagulation.  No need for INR monitoring as patient is on a DOAC.  Stop checking INR !  Pending repeat blood cultures.  Continue monitor CBC and renal profile.  Case discussed with hospitalist team.

## 2022-06-30 NOTE — PROGRESS NOTE ADULT - NUTRITIONAL ASSESSMENT
This patient has been assessed with a concern for Malnutrition and has been determined to have a diagnosis/diagnoses of Severe protein-calorie malnutrition.    This patient is being managed with:   Diet Minced and Moist-  Consistent Carbohydrate {No Snacks}  DASH/TLC {Sodium & Cholesterol Restricted}  Supplement Feeding Modality:  Oral  Nepro Cans or Servings Per Day:  1       Frequency:  Three Times a day  Entered: Jun 29 2022  2:50PM     This patient has been assessed with a concern for Malnutrition and has been determined to have a diagnosis/diagnoses of Severe protein-calorie malnutrition.    This patient is being managed with:   Diet Minced and Moist-  Consistent Carbohydrate {No Snacks}  DASH/TLC {Sodium & Cholesterol Restricted}  Supplement Feeding Modality:  Oral  Nepro Cans or Servings Per Day:  1       Frequency:  Three Times a day  Entered: Jun 29 2022  2:50PM

## 2022-07-01 VITALS
DIASTOLIC BLOOD PRESSURE: 79 MMHG | OXYGEN SATURATION: 100 % | TEMPERATURE: 98 F | HEART RATE: 107 BPM | RESPIRATION RATE: 20 BRPM | SYSTOLIC BLOOD PRESSURE: 156 MMHG

## 2022-07-01 LAB
ANION GAP SERPL CALC-SCNC: 8 MMOL/L — SIGNIFICANT CHANGE UP (ref 5–17)
BUN SERPL-MCNC: 47 MG/DL — HIGH (ref 7–23)
CALCIUM SERPL-MCNC: 6.8 MG/DL — LOW (ref 8.4–10.5)
CHLORIDE SERPL-SCNC: 110 MMOL/L — HIGH (ref 96–108)
CO2 SERPL-SCNC: 26 MMOL/L — SIGNIFICANT CHANGE UP (ref 22–31)
CREAT SERPL-MCNC: 2.45 MG/DL — HIGH (ref 0.5–1.3)
EGFR: 20 ML/MIN/1.73M2 — LOW
GLUCOSE BLDC GLUCOMTR-MCNC: 158 MG/DL — HIGH (ref 70–99)
GLUCOSE BLDC GLUCOMTR-MCNC: 242 MG/DL — HIGH (ref 70–99)
GLUCOSE SERPL-MCNC: 187 MG/DL — HIGH (ref 70–99)
HCT VFR BLD CALC: 21.9 % — LOW (ref 34.5–45)
HGB BLD-MCNC: 7.2 G/DL — LOW (ref 11.5–15.5)
MAGNESIUM SERPL-MCNC: 1.2 MG/DL — LOW (ref 1.6–2.6)
MCHC RBC-ENTMCNC: 27.7 PG — SIGNIFICANT CHANGE UP (ref 27–34)
MCHC RBC-ENTMCNC: 32.9 GM/DL — SIGNIFICANT CHANGE UP (ref 32–36)
MCV RBC AUTO: 84.2 FL — SIGNIFICANT CHANGE UP (ref 80–100)
NRBC # BLD: 0 /100 WBCS — SIGNIFICANT CHANGE UP (ref 0–0)
PLATELET # BLD AUTO: 250 K/UL — SIGNIFICANT CHANGE UP (ref 150–400)
POTASSIUM SERPL-MCNC: 3.4 MMOL/L — LOW (ref 3.5–5.3)
POTASSIUM SERPL-SCNC: 3.4 MMOL/L — LOW (ref 3.5–5.3)
RBC # BLD: 2.6 M/UL — LOW (ref 3.8–5.2)
RBC # FLD: 17.9 % — HIGH (ref 10.3–14.5)
SODIUM SERPL-SCNC: 144 MMOL/L — SIGNIFICANT CHANGE UP (ref 135–145)
WBC # BLD: 17.59 K/UL — HIGH (ref 3.8–10.5)
WBC # FLD AUTO: 17.59 K/UL — HIGH (ref 3.8–10.5)

## 2022-07-01 PROCEDURE — 86901 BLOOD TYPING SEROLOGIC RH(D): CPT

## 2022-07-01 PROCEDURE — 85610 PROTHROMBIN TIME: CPT

## 2022-07-01 PROCEDURE — 71045 X-RAY EXAM CHEST 1 VIEW: CPT

## 2022-07-01 PROCEDURE — 36415 COLL VENOUS BLD VENIPUNCTURE: CPT

## 2022-07-01 PROCEDURE — 85379 FIBRIN DEGRADATION QUANT: CPT

## 2022-07-01 PROCEDURE — 93005 ELECTROCARDIOGRAM TRACING: CPT

## 2022-07-01 PROCEDURE — 96365 THER/PROPH/DIAG IV INF INIT: CPT

## 2022-07-01 PROCEDURE — 86850 RBC ANTIBODY SCREEN: CPT

## 2022-07-01 PROCEDURE — 83735 ASSAY OF MAGNESIUM: CPT

## 2022-07-01 PROCEDURE — 87635 SARS-COV-2 COVID-19 AMP PRB: CPT

## 2022-07-01 PROCEDURE — 99239 HOSP IP/OBS DSCHRG MGMT >30: CPT

## 2022-07-01 PROCEDURE — 86900 BLOOD TYPING SEROLOGIC ABO: CPT

## 2022-07-01 PROCEDURE — 0225U NFCT DS DNA&RNA 21 SARSCOV2: CPT

## 2022-07-01 PROCEDURE — 83605 ASSAY OF LACTIC ACID: CPT

## 2022-07-01 PROCEDURE — 87150 DNA/RNA AMPLIFIED PROBE: CPT

## 2022-07-01 PROCEDURE — 87086 URINE CULTURE/COLONY COUNT: CPT

## 2022-07-01 PROCEDURE — 99285 EMERGENCY DEPT VISIT HI MDM: CPT | Mod: 25

## 2022-07-01 PROCEDURE — 85025 COMPLETE CBC W/AUTO DIFF WBC: CPT

## 2022-07-01 PROCEDURE — 97163 PT EVAL HIGH COMPLEX 45 MIN: CPT

## 2022-07-01 PROCEDURE — 80053 COMPREHEN METABOLIC PANEL: CPT

## 2022-07-01 PROCEDURE — 96367 TX/PROPH/DG ADDL SEQ IV INF: CPT

## 2022-07-01 PROCEDURE — 74176 CT ABD & PELVIS W/O CONTRAST: CPT | Mod: MA

## 2022-07-01 PROCEDURE — 87077 CULTURE AEROBIC IDENTIFY: CPT

## 2022-07-01 PROCEDURE — 82962 GLUCOSE BLOOD TEST: CPT

## 2022-07-01 PROCEDURE — 85027 COMPLETE CBC AUTOMATED: CPT

## 2022-07-01 PROCEDURE — 92610 EVALUATE SWALLOWING FUNCTION: CPT

## 2022-07-01 PROCEDURE — 85730 THROMBOPLASTIN TIME PARTIAL: CPT

## 2022-07-01 PROCEDURE — 81001 URINALYSIS AUTO W/SCOPE: CPT

## 2022-07-01 PROCEDURE — 87186 SC STD MICRODIL/AGAR DIL: CPT

## 2022-07-01 PROCEDURE — P9047: CPT

## 2022-07-01 PROCEDURE — 80048 BASIC METABOLIC PNL TOTAL CA: CPT

## 2022-07-01 PROCEDURE — 85384 FIBRINOGEN ACTIVITY: CPT

## 2022-07-01 PROCEDURE — 82803 BLOOD GASES ANY COMBINATION: CPT

## 2022-07-01 PROCEDURE — 82550 ASSAY OF CK (CPK): CPT

## 2022-07-01 PROCEDURE — 96366 THER/PROPH/DIAG IV INF ADDON: CPT

## 2022-07-01 PROCEDURE — 92526 ORAL FUNCTION THERAPY: CPT

## 2022-07-01 PROCEDURE — 83615 LACTATE (LD) (LDH) ENZYME: CPT

## 2022-07-01 PROCEDURE — 87040 BLOOD CULTURE FOR BACTERIA: CPT

## 2022-07-01 PROCEDURE — 84100 ASSAY OF PHOSPHORUS: CPT

## 2022-07-01 RX ORDER — POTASSIUM CHLORIDE 20 MEQ
40 PACKET (EA) ORAL ONCE
Refills: 0 | Status: DISCONTINUED | OUTPATIENT
Start: 2022-07-01 | End: 2022-07-01

## 2022-07-01 RX ORDER — MAGNESIUM SULFATE 500 MG/ML
2 VIAL (ML) INJECTION ONCE
Refills: 0 | Status: COMPLETED | OUTPATIENT
Start: 2022-07-01 | End: 2022-07-01

## 2022-07-01 RX ORDER — POTASSIUM CHLORIDE 20 MEQ
40 PACKET (EA) ORAL ONCE
Refills: 0 | Status: COMPLETED | OUTPATIENT
Start: 2022-07-01 | End: 2022-07-01

## 2022-07-01 RX ADMIN — Medication 2: at 12:21

## 2022-07-01 RX ADMIN — PANTOPRAZOLE SODIUM 40 MILLIGRAM(S): 20 TABLET, DELAYED RELEASE ORAL at 12:23

## 2022-07-01 RX ADMIN — CHLORHEXIDINE GLUCONATE 1 APPLICATION(S): 213 SOLUTION TOPICAL at 06:17

## 2022-07-01 RX ADMIN — SERTRALINE 50 MILLIGRAM(S): 25 TABLET, FILM COATED ORAL at 12:22

## 2022-07-01 RX ADMIN — APIXABAN 5 MILLIGRAM(S): 2.5 TABLET, FILM COATED ORAL at 05:20

## 2022-07-01 RX ADMIN — Medication 25 GRAM(S): at 12:21

## 2022-07-01 RX ADMIN — Medication 1: at 08:25

## 2022-07-01 RX ADMIN — CEFEPIME 100 MILLIGRAM(S): 1 INJECTION, POWDER, FOR SOLUTION INTRAMUSCULAR; INTRAVENOUS at 13:10

## 2022-07-01 RX ADMIN — Medication 40 MILLIEQUIVALENT(S): at 12:22

## 2022-07-01 NOTE — PROGRESS NOTE ADULT - SUBJECTIVE AND OBJECTIVE BOX
Patient is a 72y old  Female who presents with a chief complaint of Sepsis (30 Jun 2022 15:02)      Patient seen and examined at bedside. No overnight events reported.  Pt with no complaints this morning; Nurse states she is eating well.    ALLERGIES:  No Known Allergies    MEDICATIONS  (STANDING):  apixaban 5 milliGRAM(s) Oral two times a day  atorvastatin 80 milliGRAM(s) Oral at bedtime  bisacodyl 5 milliGRAM(s) Oral at bedtime  cefepime   IVPB 500 milliGRAM(s) IV Intermittent daily  chlorhexidine 2% Cloths 1 Application(s) Topical <User Schedule>  dextrose 5%. 1000 milliLiter(s) (100 mL/Hr) IV Continuous <Continuous>  dextrose 5%. 1000 milliLiter(s) (50 mL/Hr) IV Continuous <Continuous>  dextrose 50% Injectable 12.5 Gram(s) IV Push once  dextrose 50% Injectable 25 Gram(s) IV Push once  insulin glargine Injectable (LANTUS) 6 Unit(s) SubCutaneous at bedtime  insulin lispro (ADMELOG) corrective regimen sliding scale   SubCutaneous at bedtime  insulin lispro (ADMELOG) corrective regimen sliding scale   SubCutaneous three times a day before meals  mirtazapine 15 milliGRAM(s) Oral at bedtime  pantoprazole    Tablet 40 milliGRAM(s) Oral daily  senna 2 Tablet(s) Oral at bedtime  sertraline 50 milliGRAM(s) Oral daily  sodium chloride 0.45% with potassium chloride 20 mEq/L 1000 milliLiter(s) (75 mL/Hr) IV Continuous <Continuous>    MEDICATIONS  (PRN):  acetaminophen     Tablet .. 650 milliGRAM(s) Oral every 6 hours PRN Temp greater or equal to 38C (100.4F), Mild Pain (1 - 3)    Vital Signs Last 24 Hrs  T(F): 98.1 (01 Jul 2022 05:34), Max: 98.1 (30 Jun 2022 20:47)  HR: 104 (01 Jul 2022 05:34) (92 - 104)  BP: 141/67 (01 Jul 2022 05:34) (139/70 - 161/74)  RR: 20 (01 Jul 2022 05:34) (16 - 20)  SpO2: 100% (01 Jul 2022 05:34) (100% - 100%)  I&O's Summary    30 Jun 2022 07:01  -  01 Jul 2022 07:00  --------------------------------------------------------  IN: 0 mL / OUT: 2350 mL / NET: -2350 mL      PHYSICAL EXAM:  General: NAD, A/O x 3  ENT: No gross hearing impairment, Moist mucous membranes, no thrush  Neck: Supple, No JVD  Lungs: Clear to auscultation bilaterally, good air entry, non-labored breathing  Cardio: RRR, S1/S2, No murmur  Abdomen: Soft, Nontender, Nondistended; Bowel sounds present  Extremities: No calf tenderness, No cyanosis, No pitting edema  Neuro:  left upper ext contracture with left sided hemiparesis, speech hypophonic    LABS:                        7.2    17.59 )-----------( 250      ( 01 Jul 2022 05:50 )             21.9     07-01    144  |  110  |  47  ----------------------------<  187  3.4   |  26  |  2.45    Ca    6.8      01 Jul 2022 05:50  Mg     1.2     06-30                      06-12 Chol 145 mg/dL LDL -- HDL 43 mg/dL Trig 83 mg/dL              POCT Blood Glucose.: 158 mg/dL (01 Jul 2022 08:21)  POCT Blood Glucose.: 178 mg/dL (30 Jun 2022 21:21)  POCT Blood Glucose.: 161 mg/dL (30 Jun 2022 17:02)  POCT Blood Glucose.: 205 mg/dL (30 Jun 2022 12:28)          Culture - Blood (collected 29 Jun 2022 00:40)  Source: .Blood Blood  Preliminary Report (30 Jun 2022 04:02):    No growth to date.    Culture - Blood (collected 29 Jun 2022 00:40)  Source: .Blood Blood  Preliminary Report (30 Jun 2022 04:02):    No growth to date.      COVID-19 PCR: NotDetec (06-30-22 @ 07:44)  COVID-19 PCR: NotDetec (06-11-22 @ 18:10)    RADIOLOGY & ADDITIONAL TESTS:    Care Discussed with Consultants/Other Providers:

## 2022-07-01 NOTE — PROGRESS NOTE ADULT - PROVIDER SPECIALTY LIST ADULT
Critical Care
Heme/Onc
Hospitalist
Infectious Disease
Nephrology
Pulmonology
Critical Care
Critical Care
Heme/Onc
Hospitalist
Infectious Disease
Nephrology
Nephrology
Critical Care
Infectious Disease
Pulmonology
Heme/Onc
Heme/Onc
Hospitalist
Heme/Onc

## 2022-07-01 NOTE — PROGRESS NOTE ADULT - NS ATTEND AMEND GEN_ALL_CORE FT
Septic Shock - resolved  UTI, Pseudomonas bacteremia - on Cefepime per ID, which is to be switched to Levaquin upon discharge today  MAXIMILIAN - improved, outpatient MRI abdomen for renal lesion - relayed to Dr. Hodges at [facility]

## 2022-07-01 NOTE — PROGRESS NOTE ADULT - ASSESSMENT
71 yo female with PMHx of CVA, TIA, CKD3, urinary retention transferred from ICU; she was admitted with septic shock.  Pt now on medical floor.    Septic shock; resolved  UTI and Urinary retention  Pseudomonas bacteremia   Leukocytosis; improved but remained elevated   -cont antibx, completed course of IV Cefepime  -change to Levaquin 250mg for 4 more days as per ID   -BC on 6/23/22: pseudomonas  -Blood cx- NGTD from 6/29  -urine culture contaminated  -cont rodriguez. per urology, pt may benefit from chronic rodriguez for urinary retention due to CVA  -Renal following; replete K    MAXIMILIAN/CKD3 with ATN. baseline Cr around 2.5   Possible Left renal lesion  -MAXIMILIAN improving; Cr improved to 2.7 today   -Renal on board  -cont IVF  -Abnormal CT: No evidence for a calculus in the kidneys or ureters. No hydronephrosis. Bilateral extrarenal pelvises. 1.7 cm heterogeneous left renal lesion--abdominal MR w/wo IV contrast to be considered to rule out a solid malignant neoplasm.  -MRI abd as outpatient    Hypokalemia; improved s/p repletion  3.4 today     DVT on eliquis  Elevated INR s/p Vit K , ? from sepsis  -INR improved, stop checking per Heme   -continue on Eliquis, cont treatment for new DVT    Anemia of chronic renal dz.  -cont to monitor  -no signs of gross bleeding  -Heme following    Underlying Dementia, Depression  -dementia mild, cont Remeron, Sertraline    hx of CVA with left sided weakness  -cont Apixaban and Statin    DM2 on insulin  HgbA1c 6.1  -cont Lantus 6 units daily  -ISS and accuchecks    DVT ppx - on Apixaban  Goals of Care:  DNR/I , MOLST completed by palliative    Dispo: Sister, Valentine Vidal;  updated on plan of care and anticipation of discharge today back to SNF

## 2022-07-01 NOTE — PROGRESS NOTE ADULT - NUTRITIONAL ASSESSMENT
This patient has been assessed with a concern for Malnutrition and has been determined to have a diagnosis/diagnoses of Severe protein-calorie malnutrition.    This patient is being managed with:   Diet Soft and Bite Sized-  Consistent Carbohydrate {Evening Snack}  Low Sodium  Supplement Feeding Modality:  Oral  Nepro Cans or Servings Per Day:  1       Frequency:  Three Times a day  Entered: Jun 30 2022  3:28PM

## 2022-07-01 NOTE — DISCHARGE NOTE NURSING/CASE MANAGEMENT/SOCIAL WORK - PATIENT PORTAL LINK FT
You can access the FollowMyHealth Patient Portal offered by [facility] by registering at the following website: [email or web address]. By joining [facility]’s FollowMyHealth portal, you will also be able to view your health information using other applications (apps) compatible with our system.

## 2022-07-04 LAB
CULTURE RESULTS: SIGNIFICANT CHANGE UP
CULTURE RESULTS: SIGNIFICANT CHANGE UP
SPECIMEN SOURCE: SIGNIFICANT CHANGE UP
SPECIMEN SOURCE: SIGNIFICANT CHANGE UP

## 2022-12-16 NOTE — PATIENT PROFILE ADULT - STATED REASON FOR ADMISSION
Rx Refill Note  Patient has rescheduled annual exam to 1/12/2023.  We have received a refill request from the pharmacy.  Requested Prescriptions      No prescriptions requested or ordered in this encounter      Last office visit with prescribing clinician: 12/2/2021   Last telemedicine visit with prescribing clinician: 1/12/2023   Next office visit with prescribing clinician: 1/12/2023                         Would you like a call back once the refill request has been completed: [] Yes [] No    If the office needs to give you a call back, can they leave a voicemail: [] Yes [] No    Bria Miner MA  12/16/22, 15:47 EST   Left sided facial droop

## 2023-10-26 NOTE — PROGRESS NOTE ADULT - SUBJECTIVE AND OBJECTIVE BOX
JENNIFER REBOLLEDO, 72y Female  MRN: [id number]  ATTENDING: Joseph Ng    HPI:  72F, nursing home resident, with PMHx CVA/TIA, dementia, depression, dyslipidemia, CDK, DM2, DVT, admitted at [facility] with sepsis; recently on antibiotics for UTI, discharged from [facility] 3 days ago on Eliquis and Ceftin.  While in nursing home patient started to present abdominal discomfort and hypotension.  Found with leukocytosis (WBC> 30K with myeloid shift).  In the emergency room she was screaming in pain.  CT abdomen showed no bowel obstruction, present colonic diverticulosis without diverticulitis, and a 1.7 cm left renal lesion (?  malignancy).  Additionally, patient found coagulopathic (INR>15); hematology consulted on etiology of coagulopathy.    MEDICATIONS:  acetaminophen     Tablet .. 650 milliGRAM(s) Oral every 6 hours PRN  atorvastatin 80 milliGRAM(s) Oral at bedtime  cefepime   IVPB 500 milliGRAM(s) IV Intermittent daily  chlorhexidine 2% Cloths 1 Application(s) Topical <User Schedule>  dextrose 5%. 1000 milliLiter(s) IV Continuous <Continuous>  dextrose 5%. 1000 milliLiter(s) IV Continuous <Continuous>  dextrose 50% Injectable 25 Gram(s) IV Push once  dextrose 50% Injectable 12.5 Gram(s) IV Push once  dextrose 50% Injectable 25 Gram(s) IV Push once  insulin glargine Injectable (LANTUS) 6 Unit(s) SubCutaneous at bedtime  insulin lispro (ADMELOG) corrective regimen sliding scale   SubCutaneous at bedtime  insulin lispro (ADMELOG) corrective regimen sliding scale   SubCutaneous three times a day before meals  lactated ringers. 1000 milliLiter(s) IV Continuous <Continuous>  magnesium sulfate  IVPB 2 Gram(s) IV Intermittent every 2 hours  midodrine 5 milliGRAM(s) Oral every 8 hours  mirtazapine 15 milliGRAM(s) Oral at bedtime  pantoprazole    Tablet 40 milliGRAM(s) Oral daily  phenylephrine    Infusion 0.1 MICROgram(s)/kG/Min IV Continuous <Continuous>  potassium chloride  10 mEq/100 mL IVPB 10 milliEquivalent(s) IV Intermittent every 1 hour  sertraline 50 milliGRAM(s) Oral daily  traMADol 25 milliGRAM(s) Oral every 12 hours PRN    All other medications reviewed.    SUBJECTIVE:  Alert, somewhat confused.  Continues IV antibiotics.  WBC slightly increased at 17 K.    VITALS:  T(C): 36.4 (06-25-22 @ 05:00), Max: 36.9 (06-24-22 @ 22:00)  T(F): 97.6 (06-25-22 @ 05:00), Max: 98.4 (06-24-22 @ 22:00)  HR: 91 (06-25-22 @ 07:00) (89 - 126)  BP: 100/55 (06-25-22 @ 07:00) (84/48 - 131/58)    PHYSICAL EXAM:  Constitutional: alert, well developed  HEENT: normocephalic, anicteric sclerae, no mucositis or thrush  Respiratory: bilateral clear to auscultation anteriorly  Cardiovascular : S1, S2 regular, rhythmic, no murmurs, gallops or rubs  Abdomen: soft, distended, + normoactive BS, no palpable HS- megaly  Extremities: no tenderness;  -c/c/e, pulses equal bilaterally    LABS:  INR 1.97  Platelet Count - Automated: 418 K/uL (06-25-22 @ 05:30)  Platelet Count - Automated: 284 K/uL (06-24-22 @ 22:40)  Platelet Count - Automated: 258 K/uL (06-24-22 @ 12:15)    Hemoglobin: 7.2 g/dL (06-25-22 @ 05:30)  Hemoglobin: 7.6 g/dL (06-24-22 @ 22:40)  Hemoglobin: 7.3 g/dL (06-24-22 @ 12:15)    WBC Count: 20.84 K/uL (06-25-22 @ 05:30)  WBC Count: 18.89 K/uL (06-24-22 @ 22:40)  WBC Count: 21.54 K/uL (06-24-22 @ 12:15)    (06-25) WBC: 20.84 K/uL,Hemoglobin: 7.2 g/dL, Hematocrit: 21.8 %,  Platelet: 418 K/uL  (06-25) Na: 144 mmol/L ; K: 2.9 mmol/L ; BUN: 83 mg/dL ; Cr: 4.85 mg/dL.    RADIOLOGY:  ACC: [id number] EXAM:  CT ABDOMEN AND PELVIS                          PROCEDURE DATE:  06/23/2022          INTERPRETATION:  CLINICAL INFORMATION: Abdominal pain    COMPARISON: None.    CONTRAST/COMPLICATIONS:  IV Contrast: NONE  Oral Contrast: NONE  Complications: None reported at time of study completion    PROCEDURE:  CT of the Abdomen and Pelvis was performed.  Sagittal and coronal reformats were performed.    FINDINGS: The examination is limited by lack of IV/oral contrast.  LOWER CHEST: Small bilateral pleural effusions. Mild bilateral   atelectasis. Prominence of the central pulmonary arteries, suggestive of   pulmonary arterial hypertension.    LIVER: Within normal limits.  BILE DUCTS: Normal caliber.  GALLBLADDER: Within normal limits.  SPLEEN: Within normal limits.  PANCREAS: Within normal limits.  ADRENALS: Nonspecific mild adrenal thickening bilaterally.  KIDNEYS/URETERS: No evidence for a calculus in the kidneys or ureters. No   hydronephrosis. Bilateral extrarenal pelvises. Apparent 1.7 cm   heterogeneous left renal lesion with Hounsfield unit of 33 (image 53   series 2); if clinically indicated, abdominal MR without and with IV   contrast may be pursued to rule out a solid malignant neoplasm.    BLADDER: Mckeon catheter in the urinary bladder which is collapsed.  REPRODUCTIVE ORGANS: The uterus is not visualized, probably surgically   absent.    BOWEL: No bowel obstruction or grossly thickened bowel wall. Colonic   diverticulosis without evidence of diverticulitis. Appendix within normal   limits. Moderate amount of stool in the rectum.  PERITONEUM: No free air or ascites.  VESSELS: Calcified atherosclerotic disease.  RETROPERITONEUM/LYMPH NODES: No lymphadenopathy.  ABDOMINAL WALL: Small fat-containing umbilical hernia.  BONES: Mild degenerative spondylosis.    IMPRESSION:  No bowel obstruction or grossly thickened bowel wall. Colonic   diverticulosis without evidence of diverticulitis. Appendix within normal   limits. Moderate amount of stool in the rectum.    Small bilateral pleural effusions    Apparent 1.7 cm heterogeneous left renal lesion with Hounsfield unit of   33; if clinically indicated, abdominal MR without and with IV contrast   may be pursued to rule out a solid malignant neoplasm.   Statement Selected

## 2024-02-07 NOTE — PATIENT PROFILE ADULT - MEDICATIONS/VISITS
Virtual Visit Details    Type of service:  Video Visit   Video Start Time:  10:28 AM  Video End Time: 11:03 AM    Originating Location (pt. 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 She also reported hx of SIBO    She stated that she was found to have SIBO with symptoms that include a lot of diarrhea and also had EGD two months ago and found to have an ulcer. She was started on PPI. She is now feeling really well with regards to her ulcer after starting PPI and made some dietary changes after looking up recommendations online and on [employer] site. She is avoiding fried foods, spicy foods and alcohol. She hasn't given up coffee though and would like to continue drinking some. She has 1 c in morning and then an iced coffee in the afternoon but tolerating and not causing her any upper GI/ulcer symptoms. She has a f/up EGD in a couple of weeks on 2/22 to recheck.     She is now eating mostly a normal diet. She is still losing a little bit of weight. She lost 25 lbs in the past year with having SIBO and ulcer symptoms so now she is trying to maintain her weight. From chart review, she is down 36 lbs in 14 months. Was 188 lbs in 11/2022, then 177 lbs in 1/2023 and was 170 lbs in 6/2023 and now ~150-152 lbs. Overall down ~14-15% over the  past year and 11% over the past 8 months. She reported hx of being heavy, hx of bad eating habits and working with weight watchers and has hx of gastric bypass (in about 2010). She was recently on an overseas cruise x 14 days and was able to eat a normal diet. Just towards the end of the trip, she started to have more diarrhea and SiBO symptoms. She c/o having ongoing issues with multiple diarrhea stools currently. Initially stools were a [place] 7 but now stools are feathery and more [place] 6, not formed though. When she first started having issues with SIBO she looked up some information online and had been following a version on low FODMAP and SIBO diet tips previously and it helped with her symptoms. She is interested in diet education and tips for SIBO and had some diet question r/t recent issues with ulcer but since this is mostly improved mostly SIBO diet and diarrhea diet questions. She doesn't want to follow a low FODMAP diet yet as she would like to wait to see what her next EGD shows and try some other tips discussed today. See goals below.    Patient Active Problem List   Diagnosis    Family history of malignant neoplasm of breast    Female stress incontinence    Sleep apnea    Osteopenia    S/P gastric bypass    Vitamin D deficiency    Hyperparathyroidism (H24)    ACP (advance care planning)    Class 1 obesity in adult    Iron deficiency anemia    Lump or mass in breast    PFO (patent foramen ovale)    Essential hypertension with goal blood pressure less than 140/90    Left temporal lobe infarction (H)    Cerebral aneurysm without rupture    ASD (atrial septal defect)    Migraine with aura and without status migrainosus, not intractable    Midline cystocele    Rectocele    Word finding difficulty    Received intravenous tissue plasminogen activator (tPA) in emergency department    Accidental marijuana poisoning    Atrophic vaginitis    Urge incontinence    Urinary urgency     Height:   Ht Readings from  "Last 1 Encounters:   12/07/23 1.651 m (5' 5\")     Weight: about 150-152 (fully dressed was 152 lbs today). Has had non significant weight loss of 11% over the past 8 months.  Wt Readings from Last 10 Encounters:   12/07/23 68.9 kg (152 lb)   11/30/23 68.9 kg (152 lb)   10/09/23 72.1 kg (159 lb)   08/21/23 73 kg (161 lb)   06/14/23 77.1 kg (170 lb)   03/09/23 78.5 kg (173 lb)   01/06/23 80.4 kg (177 lb 4.8 oz)   11/10/22 85.4 kg (188 lb 4.8 oz)   08/24/22 85.9 kg (189 lb 4.8 oz)   05/04/22 90.7 kg (200 lb)     BMI: Estimated body mass index is 25.29 kg/m  as calculated from the following:    Height as of 12/7/23: 1.651 m (5' 5\").    Weight as of 12/7/23: 68.9 kg (152 lb).    Diet Recall:  (some usual/recent meals/snacks/beverages): under treatment with infectious dz doctor for bladder issues. Tends to get hungry in evenings so eats more during the evening until bedtime. Eats smaller amounts at a time, may only be able to eat 4 bites of a food/1/4 of hamburger. Tries to eat the protein portion first, if unable to eat much.  Meal Food    Breakfast Likes variety: yogurt with fr and granola or eggs and toast or cold cereal and fruit or toast with PB and banana   Lunch Forces herslef to eat: 1/2 sandwich or leftover fish   Dinner 5 PM: chicken/fish with vegetables and tries to eat salads or enchilada casserole   Snacks Eats a lot more at night: 7, 9 PM and onward:     Beverages 1 c in AM and iced coffee drink in afternoon,  oz water   Alcohol Intake none      Labs:    Last Comprehensive Metabolic Panel:  Sodium   Date Value Ref Range Status   11/30/2023 137 135 - 145 mmol/L Final     Comment:     Reference intervals for this test were updated on 09/26/2023 to more accurately reflect our healthy population. There may be differences in the flagging of prior results with similar values performed with this method. Interpretation of those prior results can be made in the context of the updated reference intervals.  "   03/23/2021 132 (L) 133 - 144 mmol/L Final     Potassium   Date Value Ref Range Status   11/30/2023 4.2 3.4 - 5.3 mmol/L Final   02/14/2022 4.3 3.4 - 5.3 mmol/L Final   03/23/2021 3.4 3.4 - 5.3 mmol/L Final     Chloride   Date Value Ref Range Status   11/30/2023 99 98 - 107 mmol/L Final   02/14/2022 106 94 - 109 mmol/L Final   03/23/2021 101 94 - 109 mmol/L Final     Carbon Dioxide   Date Value Ref Range Status   03/23/2021 25 20 - 32 mmol/L Final     Carbon Dioxide (CO2)   Date Value Ref Range Status   11/30/2023 28 22 - 29 mmol/L Final   02/14/2022 30 20 - 32 mmol/L Final     Anion Gap   Date Value Ref Range Status   11/30/2023 10 7 - 15 mmol/L Final   02/14/2022 3 3 - 14 mmol/L Final   03/23/2021 6 3 - 14 mmol/L Final     Glucose   Date Value Ref Range Status   11/30/2023 95 70 - 99 mg/dL Final   02/14/2022 95 70 - 99 mg/dL Final   03/23/2021 131 (H) 70 - 99 mg/dL Final     Urea Nitrogen   Date Value Ref Range Status   11/30/2023 20.7 8.0 - 23.0 mg/dL Final   02/14/2022 18 7 - 30 mg/dL Final   03/23/2021 19 7 - 30 mg/dL Final     Creatinine   Date Value Ref Range Status   11/30/2023 0.88 0.51 - 0.95 mg/dL Final   03/23/2021 0.85 0.52 - 1.04 mg/dL Final     GFR Estimate   Date Value Ref Range Status   11/30/2023 71 >60 mL/min/1.73m2 Final   03/23/2021 71 >60 mL/min/[1.73_m2] Final     Comment:     Non  GFR Calc  Starting 12/18/2018, serum creatinine based estimated GFR (eGFR) will be   calculated using the Chronic Kidney Disease Epidemiology Collaboration   (CKD-EPI) equation.       Calcium   Date Value Ref Range Status   11/30/2023 9.3 8.8 - 10.2 mg/dL Final   03/23/2021 9.0 8.5 - 10.1 mg/dL Final     CBC RESULTS:   Recent Labs   Lab Test 11/30/23  1633   WBC 7.7   RBC 4.39   HGB 13.3   HCT 39.1   MCV 89   MCH 30.3   MCHC 34.0   RDW 13.3        Pertinent Medications/vitamin and mineral supplements:      Current Outpatient Medications   Medication    aspirin 325 MG tablet    atenolol  (TENORMIN) 50 MG tablet    Calcium Citrate-Vitamin D (CALCIUM CITRATE + D PO)    ciprofloxacin (CIPRO) 500 MG tablet    COMPOUNDED NON-CONTROLLED SUBSTANCE (CMPD RX) - PHARMACY TO MIX COMPOUNDED MEDICATION    COMPOUNDED NON-CONTROLLED SUBSTANCE (CMPD RX) - PHARMACY TO MIX COMPOUNDED MEDICATION    Cyanocobalamin 2500 MCG TABS    Ferrous Sulfate 27 MG TABS    Fesoterodine Fumarate 8 MG TB24    hydrochlorothiazide (HYDRODIURIL) 12.5 MG tablet    imipramine (TOFRANIL) 25 MG tablet    losartan (COZAAR) 50 MG tablet    methylPREDNISolone (MEDROL) 32 MG tablet    methylPREDNISolone (MEDROL) 32 MG tablet    methylPREDNISolone (MEDROL) 32 MG tablet    omeprazole (PRILOSEC) 40 MG DR capsule    topiramate (TOPAMAX) 25 MG tablet    UNABLE TO FIND    valACYclovir (VALTREX) 1000 mg tablet    VITAMIN D, CHOLECALCIFEROL, PO     No current facility-administered medications for this visit.     Food Allergies:  NKFA     MALNUTRITION:  % Weight Loss:  10% in 6 months (moderate malnutrition)  % Intake:  <75% for >/= 3 months (moderate malnutrition)  Subcutaneous Fat Loss:  None observed  Muscle Loss:  None observed  Fluid Retention:  None noted    Malnutrition Diagnosis: Moderate malnutrition  In Context of:  Acute illness or injury  Chronic illness or disease    Nutrition Prescription: Nutrition Education     Nutrition Intervention      Provided diet education for SIBO, diarrhea, abdominal pain, ulcer, early satiety and unintentional weight loss and hx gastric bypass tips to decrease risk of loose stools and for meals that may be better tolerated.    Answered patient's questions. Patient verbalized understanding of education provided. See Goals below.     Educational Materials Provided:  Nutrition Care Manual: Low FODMAP nutrition therapy and FODMAP food chart     Goals:    Aim for eating multiple smaller meals per day such as 4-6 smaller meals per day.    Wait at least 30 minutes after eating before drinking liquids. (If you can try  to stop drinking liquid about 15-30 minutes before eating a meal too, but if not able to do this, then especially wait 30 minutes after a meal to drink).    Include some good sources of soluble fiber in your diet such as oats, barley, sweet potato, potato, aundrea seeds, flaxseeds, avocado, berries, apple (without the peel might be better), pears, beans/legumes (especially navy beans), figs, banana, brussels sprouts, carrots.    Continue to avoid sugar alcohols which are sugar substitutes such as sorbitol, xylitol, mannitol, maltitol, erythritol, isomalt, lactitol. These can be found in some sugar free candies, drinks, gums and sugar free protein bar such as vitamin water zero, Lilly drinks, some sugar free gums such as Trident and some protein bars such as Quest protein bars.    Continue to avoid alcoholic beverages and also avoid carbonated beverages.    6. If you decide that you would like to follow a low FODMAP diet after your next endoscopy, here are the directions for following a full and for following a partial low FODMAP diet:    For full low FoDMAP diet, then:  - Start with eliminating higher FODMAP foods for 3-4 weeks.   -Then after 3-4 weeks start adding those higher FODMAP foods back into diet. (see process for reintroducing below).    OR   If you prefer to do a partial lower FODMAP diet, then    -For Modified/partial lower FODMAP diet, review the list of high FODMAP foods and then eliminate several of those higher FODMAP foods that you eat most days of the week for 3-4 weeks.    After 3-4 weeks, to add higher FODMAP foods back into diet:    -Start by adding back in 1 higher fodmap food at a time by eating a small serving of that food each day for 3 days in a row OR you can gradually increase the portion size over the course of the 3 days. For example on day 1, can have 1/4 cup serving, then on day 2, can have 1/3 c and then on day 3, can have a 1/2 cup serving.    -If tolerated, then wait at least one day,  and then add another higher FODMAP food back into diet for 3 days in a row and onward.     -If you prefer to reintroduce foods slowly, then you could add one food each week (depending on how quickly you'd like to re-introduce and also depending on if you have a return of symptoms that may linger for a day or so).    -If you notice any symptoms after adding back in a higher FODMAP food than can wait until symptoms improve before trying the next higher FODMAP food.     -Also if you notice a significant increase in symptoms after retrying the first day, you do not need to eat on additional days    --Keep daily food and beverage journals and track all symptoms.    --Plan menus and meals ahead of time to help you stick to the elimination diet.    Here are some websites with low FODMAP recipes:    Www.fodmapeverClaim Maps.[employer]  Www.[employer]  IBSfree.net    [employer] at www.[employer] sells ready made low FODMAP meals.       Nutrition Monitoring and Evaluation: Will monitor adherence to nutrition recommendations at future RD visits.     Further Medical Nutrition Therapy:  Follow-up as needed.    Patient was encouraged to contact RD with any further questions.    Sylvia Hernández, MS, RD, LD         no

## 2024-02-12 NOTE — PATIENT PROFILE ADULT - PATIENT REPRESENTATIVE: ( YOU CAN CHOOSE ANY PERSON THAT CAN ASSIST YOU WITH YOUR HEALTH CARE PREFERENCES, DOES NOT HAVE TO BE A SPOUSE, IMMEDIATE FAMILY OR SIGNIFICANT OTHER/PARTNER)
Pt is waiting at  to get her chest xray/they said they never received the order.    Can we fax it right now to:  [phone]   yes

## 2025-01-26 ENCOUNTER — EMERGENCY (EMERGENCY)
Facility: HOSPITAL | Age: 75
LOS: 1 days | Discharge: HOME | End: 2025-01-26
Attending: EMERGENCY MEDICINE | Admitting: EMERGENCY MEDICINE
Payer: MEDICARE

## 2025-01-26 VITALS
TEMPERATURE: 97 F | OXYGEN SATURATION: 97 % | HEIGHT: 62 IN | SYSTOLIC BLOOD PRESSURE: 157 MMHG | WEIGHT: 179.9 LBS | RESPIRATION RATE: 18 BRPM | HEART RATE: 96 BPM | DIASTOLIC BLOOD PRESSURE: 74 MMHG

## 2025-01-26 DIAGNOSIS — Z90.710 ACQUIRED ABSENCE OF BOTH CERVIX AND UTERUS: Chronic | ICD-10-CM

## 2025-01-26 LAB
ALBUMIN SERPL ELPH-MCNC: 2.8 G/DL — LOW (ref 3.3–5)
ALP SERPL-CCNC: 154 U/L — HIGH (ref 40–120)
ALT FLD-CCNC: 13 U/L — SIGNIFICANT CHANGE UP (ref 10–45)
ANION GAP SERPL CALC-SCNC: 15 MMOL/L — SIGNIFICANT CHANGE UP (ref 5–17)
AST SERPL-CCNC: 14 U/L — SIGNIFICANT CHANGE UP (ref 10–40)
BASOPHILS # BLD AUTO: 0.04 K/UL — SIGNIFICANT CHANGE UP (ref 0–0.2)
BASOPHILS NFR BLD AUTO: 0.3 % — SIGNIFICANT CHANGE UP (ref 0–2)
BILIRUB SERPL-MCNC: 0.2 MG/DL — SIGNIFICANT CHANGE UP (ref 0.2–1.2)
BUN SERPL-MCNC: 50 MG/DL — HIGH (ref 7–23)
CALCIUM SERPL-MCNC: 8.4 MG/DL — SIGNIFICANT CHANGE UP (ref 8.4–10.5)
CHLORIDE SERPL-SCNC: 105 MMOL/L — SIGNIFICANT CHANGE UP (ref 96–108)
CO2 SERPL-SCNC: 17 MMOL/L — LOW (ref 22–31)
CREAT SERPL-MCNC: 2.99 MG/DL — HIGH (ref 0.5–1.3)
EGFR: 16 ML/MIN/1.73M2 — LOW
EOSINOPHIL # BLD AUTO: 0.25 K/UL — SIGNIFICANT CHANGE UP (ref 0–0.5)
EOSINOPHIL NFR BLD AUTO: 1.7 % — SIGNIFICANT CHANGE UP (ref 0–6)
GLUCOSE SERPL-MCNC: 138 MG/DL — HIGH (ref 70–99)
HCT VFR BLD CALC: 32.4 % — LOW (ref 34.5–45)
HGB BLD-MCNC: 10.2 G/DL — LOW (ref 11.5–15.5)
IMM GRANULOCYTES NFR BLD AUTO: 0.5 % — SIGNIFICANT CHANGE UP (ref 0–0.9)
LIDOCAIN IGE QN: 67 U/L — SIGNIFICANT CHANGE UP (ref 16–77)
LYMPHOCYTES # BLD AUTO: 25.9 % — SIGNIFICANT CHANGE UP (ref 13–44)
LYMPHOCYTES # BLD AUTO: 3.78 K/UL — HIGH (ref 1–3.3)
MCHC RBC-ENTMCNC: 27.1 PG — SIGNIFICANT CHANGE UP (ref 27–34)
MCHC RBC-ENTMCNC: 31.5 G/DL — LOW (ref 32–36)
MCV RBC AUTO: 85.9 FL — SIGNIFICANT CHANGE UP (ref 80–100)
MONOCYTES # BLD AUTO: 1.03 K/UL — HIGH (ref 0–0.9)
MONOCYTES NFR BLD AUTO: 7 % — SIGNIFICANT CHANGE UP (ref 2–14)
NEUTROPHILS # BLD AUTO: 9.44 K/UL — HIGH (ref 1.8–7.4)
NEUTROPHILS NFR BLD AUTO: 64.6 % — SIGNIFICANT CHANGE UP (ref 43–77)
NRBC # BLD: 0 /100 WBCS — SIGNIFICANT CHANGE UP (ref 0–0)
PLATELET # BLD AUTO: 356 K/UL — SIGNIFICANT CHANGE UP (ref 150–400)
POTASSIUM SERPL-MCNC: 5.4 MMOL/L — HIGH (ref 3.5–5.3)
POTASSIUM SERPL-SCNC: 5.4 MMOL/L — HIGH (ref 3.5–5.3)
PROT SERPL-MCNC: 7.7 G/DL — SIGNIFICANT CHANGE UP (ref 6–8.3)
RBC # BLD: 3.77 M/UL — LOW (ref 3.8–5.2)
RBC # FLD: 14.9 % — HIGH (ref 10.3–14.5)
SODIUM SERPL-SCNC: 137 MMOL/L — SIGNIFICANT CHANGE UP (ref 135–145)
TROPONIN I, HIGH SENSITIVITY RESULT: 9.7 NG/L — SIGNIFICANT CHANGE UP
WBC # BLD: 14.61 K/UL — HIGH (ref 3.8–10.5)
WBC # FLD AUTO: 14.61 K/UL — HIGH (ref 3.8–10.5)

## 2025-01-26 PROCEDURE — 99053 MED SERV 10PM-8AM 24 HR FAC: CPT

## 2025-01-26 PROCEDURE — 93010 ELECTROCARDIOGRAM REPORT: CPT

## 2025-01-26 PROCEDURE — 99285 EMERGENCY DEPT VISIT HI MDM: CPT

## 2025-01-26 PROCEDURE — 71045 X-RAY EXAM CHEST 1 VIEW: CPT | Mod: 26

## 2025-01-26 NOTE — ED ADULT TRIAGE NOTE - CHIEF COMPLAINT QUOTE
Patient brought in by EMS from home with complaint of chest pain for 4 days. Patent complaint of having  "elephant on her chest". EMS gave patient 1 nitro. PMH of HTN and DM Patient brought in by EMS with complaint of chest pain for 4 days. Patent complaint of having  "elephant on her chest". EMS gave patient 1 nitro. PMH of HTN and DM

## 2025-01-26 NOTE — ED ADULT NURSE NOTE - CHIEF COMPLAINT QUOTE
Patient brought in by EMS from home with complaint of chest pain for 4 days. Patent complaint of having  "elephant on her chest". EMS gave patient 1 nitro. PMH of HTN and DM

## 2025-01-26 NOTE — ED ADULT NURSE NOTE - OBJECTIVE STATEMENT
Pt is alert, brought to the ER by EMS from [facility] due to on and off chest pain for 4 days now. Had nausea yesterday. No sob or cough, fever. History of Stroke with left side paralysis.

## 2025-01-27 VITALS
OXYGEN SATURATION: 98 % | RESPIRATION RATE: 16 BRPM | HEART RATE: 91 BPM | DIASTOLIC BLOOD PRESSURE: 80 MMHG | SYSTOLIC BLOOD PRESSURE: 142 MMHG

## 2025-01-27 LAB
APPEARANCE UR: ABNORMAL
BACTERIA # UR AUTO: ABNORMAL /HPF
BILIRUB UR-MCNC: NEGATIVE — SIGNIFICANT CHANGE UP
COLOR SPEC: YELLOW — SIGNIFICANT CHANGE UP
DIFF PNL FLD: ABNORMAL
EPI CELLS # UR: PRESENT
GLUCOSE UR QL: NEGATIVE MG/DL — SIGNIFICANT CHANGE UP
KETONES UR-MCNC: NEGATIVE MG/DL — SIGNIFICANT CHANGE UP
LACTATE SERPL-SCNC: 0.7 MMOL/L — SIGNIFICANT CHANGE UP (ref 0.7–2)
LEUKOCYTE ESTERASE UR-ACNC: ABNORMAL
NITRITE UR-MCNC: NEGATIVE — SIGNIFICANT CHANGE UP
PH UR: 5.5 — SIGNIFICANT CHANGE UP (ref 5–8)
PROT UR-MCNC: 300 MG/DL
RBC CASTS # UR COMP ASSIST: 2 /HPF — SIGNIFICANT CHANGE UP (ref 0–4)
SP GR SPEC: 1.02 — SIGNIFICANT CHANGE UP (ref 1–1.03)
UROBILINOGEN FLD QL: 1 MG/DL — SIGNIFICANT CHANGE UP (ref 0.2–1)
WBC UR QL: >50 /HPF — HIGH (ref 0–5)

## 2025-01-27 PROCEDURE — 80053 COMPREHEN METABOLIC PANEL: CPT

## 2025-01-27 PROCEDURE — 83605 ASSAY OF LACTIC ACID: CPT

## 2025-01-27 PROCEDURE — 96365 THER/PROPH/DIAG IV INF INIT: CPT

## 2025-01-27 PROCEDURE — 85025 COMPLETE CBC W/AUTO DIFF WBC: CPT

## 2025-01-27 PROCEDURE — 71250 CT THORAX DX C-: CPT | Mod: MC

## 2025-01-27 PROCEDURE — 93005 ELECTROCARDIOGRAM TRACING: CPT

## 2025-01-27 PROCEDURE — 71045 X-RAY EXAM CHEST 1 VIEW: CPT

## 2025-01-27 PROCEDURE — 36415 COLL VENOUS BLD VENIPUNCTURE: CPT

## 2025-01-27 PROCEDURE — 99285 EMERGENCY DEPT VISIT HI MDM: CPT | Mod: 25

## 2025-01-27 PROCEDURE — 84484 ASSAY OF TROPONIN QUANT: CPT

## 2025-01-27 PROCEDURE — 71250 CT THORAX DX C-: CPT | Mod: 26

## 2025-01-27 PROCEDURE — 81001 URINALYSIS AUTO W/SCOPE: CPT

## 2025-01-27 PROCEDURE — 83690 ASSAY OF LIPASE: CPT

## 2025-01-27 PROCEDURE — 87086 URINE CULTURE/COLONY COUNT: CPT

## 2025-01-27 RX ORDER — CEFTRIAXONE SODIUM 1 G/1
1000 INJECTION, POWDER, FOR SOLUTION INTRAMUSCULAR; INTRAVENOUS ONCE
Refills: 0 | Status: COMPLETED | OUTPATIENT
Start: 2025-01-27 | End: 2025-01-27

## 2025-01-27 RX ADMIN — CEFTRIAXONE SODIUM 100 MILLIGRAM(S): 1 INJECTION, POWDER, FOR SOLUTION INTRAMUSCULAR; INTRAVENOUS at 02:08

## 2025-01-27 RX ADMIN — CEFTRIAXONE SODIUM 1000 MILLIGRAM(S): 1 INJECTION, POWDER, FOR SOLUTION INTRAMUSCULAR; INTRAVENOUS at 02:38

## 2025-01-27 NOTE — ED PROVIDER NOTE - NSICDXFAMILYHX_GEN_ALL_CORE_FT
Refill Request     CONFIRM preferred pharmacy with the patient.    If Mail Order Rx - Pend for 90 day refill.      Last Seen: Last Seen Department: 10/25/2024  Last Seen by PCP: 10/25/2024    Last Written:   Labetalol-05/31/2024 180 tab 1 refills   Lipitor-05/16/2024 90 tab 1 refills     If no future appointment scheduled:  Review the last OV with PCP and review information for follow-up visit,  Route STAFF MESSAGE with patient name to the  Pool for scheduling with the following information:            -  Timing of next visit           -  Visit type ie Physical, OV, etc           -  Diagnoses/Reason ie. COPD, HTN - Do not use MEDICATION, Follow-up or CHECK UP - Give reason for visit      Next Appointment:   Future Appointments   Date Time Provider Department Center   1/10/2025  1:15 PM Eduar Garcia MD [facility]   3/7/2025  8:30 AM Cheryle Eubanks, APRN - C[facility]   4/25/2025  9:30 AM Carlos Eduardo Maya, DO [facility]       Message sent to  to schedule appt with patient?  NO      Requested Prescriptions     Pending Prescriptions Disp Refills    labetalol (NORMODYNE) 100 MG tablet [Pharmacy Med Name: LABETALOL  MG TABLET] 180 tablet 1     Sig: TAKE 1 TABLET BY MOUTH TWICE A DAY    atorvastatin (LIPITOR) 10 MG tablet [Pharmacy Med Name: ATORVASTATIN 10 MG TABLET] 90 tablet 1     Sig: TAKE 1 TABLET BY MOUTH EVERY DAY       
FAMILY HISTORY:  Family history of CVA  FH: CAD (coronary artery disease)  FH: diabetes mellitus

## 2025-01-27 NOTE — ED PROVIDER NOTE - OBJECTIVE STATEMENT
74-year-old female past medical history of CVA TIA CKD presents to the emergency department today with chest pain.  Patient is since Thursday having episodes of substernal chest pain described as a pressure type sensation.  Radiates around to the right side of her chest.  There is no shortness of breath.  She does not get out of bed so she is not sure if is any exertional component.  Has been receiving Tylenol at [facility] which does help alleviate her pain for a few hours at a time.  Tonight she says she became scared so she called an ambulance on her own to take her to the hospital.

## 2025-01-27 NOTE — ED PROVIDER NOTE - PATIENT PORTAL LINK FT
You can access the FollowMyHealth Patient Portal offered by [facility] by registering at the following website: [email or web address]. By joining [employer]’s FollowMyHealth portal, you will also be able to view your health information using other applications (apps) compatible with our system.

## 2025-01-27 NOTE — ED PROVIDER NOTE - NSFOLLOWUPINSTRUCTIONS_ED_ALL_ED_FT
No cause for your chest pain was found in the emergency department today.  You will require antibiotics Keflex 500 mg 3 times daily for 7 days.  You have a small pleural effusion that the doctors at the nursing facility will need to evaluate.    Urinary Tract Infection    A urinary tract infection (UTI) is an infection of any part of the urinary tract, which includes the kidneys, ureters, bladder, and urethra. Risk factors include ignoring your need to urinate, wiping back to front if female, being an uncircumcised male, and having diabetes or a weak immune system. Symptoms include frequent urination, pain or burning with urination, foul smelling urine, cloudy urine, pain in the lower abdomen, blood in the urine, and fever. If you were prescribed an antibiotic medicine, take it as told by your health care provider. Do not stop taking the antibiotic even if you start to feel better.    SEEK IMMEDIATE MEDICAL CARE IF YOU HAVE ANY OF THE FOLLOWING SYMPTOMS: severe back or abdominal pain, fever, inability to keep fluids or medicine down, dizziness/lightheadedness, or a change in mental status.

## 2025-01-27 NOTE — ED PROVIDER NOTE - CLINICAL SUMMARY MEDICAL DECISION MAKING FREE TEXT BOX
74-year-old female past medical history of CVA TIA CKD presenting with chest pains.  Differential includes but is not limited to ACS, muscular pain, pneumonia, PE, pneumothorax.  Based on the patient's age I will get a cardiac workup if noticed symptoms are not really consistent based on the timeframe.  EKG is unchanged from an EKG back in 2022.  Her laboratory studies show a creatinine that is at her baseline.  There is a white count of 14 with any other obvious etiologies.  Her chest x-ray appeared abnormal so a CT scan was obtained which showed a small pleural effusion on the left.  She will need outpatient follow-up for this is not of her symptoms are related to this symptom.  No contrast was given but again I do not believe the PE is likely based on the patient's hemodynamics.  Patient's urinalysis did demonstrate a UTI.  Based on her white count and pulse rate of 96 she does meet SIRS criteria.  She will require antibiotics.  Discussed with [facility] and they are comfortable with her taking p.o. antibiotics at their facility.  The patient has no reason not to be able to take p.o. at this time.  She is at her baseline mental status. 74-year-old female past medical history of CVA TIA CKD presenting with chest pains.  Differential includes but is not limited to ACS, muscular pain, pneumonia, PE, pneumothorax.  Based on the patient's age I will get a cardiac workup if noticed symptoms are not really consistent based on the timeframe.  EKG is unchanged from an EKG back in 2022.  Her laboratory studies show a creatinine that is at her baseline.  There is a white count of 14 with any other obvious etiologies.  Her chest x-ray appeared abnormal so a CT scan was obtained which showed a small pleural effusion on the left.  She will need outpatient follow-up for this is not of her symptoms are related to this symptom.  No contrast was given but again I do not believe the PE is likely based on the patient's hemodynamics.  Patient's urinalysis did demonstrate a UTI.  Based on her white count and pulse rate of 96 she does meet SIRS criteria.  She will require antibiotics.  Discussed with [facility] and they are comfortable with her taking p.o. antibiotics at their facility.  The patient has no reason not to be able to take p.o. at this time.  She is at her baseline mental status.Lactate was obtained's which was normal.  Patient remains he hemodynamically stable here in the emergency department.  Will discharge back to [facility]

## 2025-01-28 LAB
CULTURE RESULTS: SIGNIFICANT CHANGE UP
SPECIMEN SOURCE: SIGNIFICANT CHANGE UP